# Patient Record
Sex: FEMALE | Race: OTHER | Employment: UNEMPLOYED | URBAN - METROPOLITAN AREA
[De-identification: names, ages, dates, MRNs, and addresses within clinical notes are randomized per-mention and may not be internally consistent; named-entity substitution may affect disease eponyms.]

---

## 2017-05-14 ENCOUNTER — APPOINTMENT (OUTPATIENT)
Dept: ULTRASOUND IMAGING | Age: 32
End: 2017-05-14
Attending: EMERGENCY MEDICINE
Payer: MEDICAID

## 2017-05-14 ENCOUNTER — HOSPITAL ENCOUNTER (EMERGENCY)
Age: 32
Discharge: HOME | End: 2017-05-14
Attending: EMERGENCY MEDICINE
Payer: MEDICAID

## 2017-05-14 VITALS
BODY MASS INDEX: 21.6 KG/M2 | TEMPERATURE: 98.7 F | OXYGEN SATURATION: 100 % | WEIGHT: 110 LBS | DIASTOLIC BLOOD PRESSURE: 55 MMHG | HEART RATE: 64 BPM | HEIGHT: 60 IN | SYSTOLIC BLOOD PRESSURE: 116 MMHG | RESPIRATION RATE: 16 BRPM

## 2017-05-14 DIAGNOSIS — O20.0 THREATENED MISCARRIAGE: ICD-10-CM

## 2017-05-14 DIAGNOSIS — O02.0 BLIGHTED OVUM: Primary | ICD-10-CM

## 2017-05-14 LAB
ABO + RH BLD: NORMAL
ALBUMIN SERPL BCP-MCNC: 3.5 G/DL (ref 3.4–5)
ALBUMIN/GLOB SERPL: 1 {RATIO} (ref 0.8–1.7)
ALP SERPL-CCNC: 83 U/L (ref 45–117)
ALT SERPL-CCNC: 66 U/L (ref 13–56)
AMPHET UR QL SCN: NEGATIVE
ANION GAP BLD CALC-SCNC: 13 MMOL/L (ref 3–18)
APPEARANCE UR: CLEAR
AST SERPL W P-5'-P-CCNC: 31 U/L (ref 15–37)
BARBITURATES UR QL SCN: NEGATIVE
BASOPHILS # BLD AUTO: 0.1 K/UL (ref 0–0.06)
BASOPHILS # BLD: 1 % (ref 0–2)
BENZODIAZ UR QL: NEGATIVE
BILIRUB SERPL-MCNC: 0.2 MG/DL (ref 0.2–1)
BILIRUB UR QL: NEGATIVE
BLOOD GROUP ANTIBODIES SERPL: NORMAL
BUN SERPL-MCNC: 15 MG/DL (ref 7–18)
BUN/CREAT SERPL: 21 (ref 12–20)
CALCIUM SERPL-MCNC: 8.4 MG/DL (ref 8.5–10.1)
CANNABINOIDS UR QL SCN: NEGATIVE
CHLORIDE SERPL-SCNC: 107 MMOL/L (ref 100–108)
CO2 SERPL-SCNC: 24 MMOL/L (ref 21–32)
COCAINE UR QL SCN: NEGATIVE
COLOR UR: YELLOW
CREAT SERPL-MCNC: 0.7 MG/DL (ref 0.6–1.3)
DIFFERENTIAL METHOD BLD: ABNORMAL
EOSINOPHIL # BLD: 0.1 K/UL (ref 0–0.4)
EOSINOPHIL NFR BLD: 0 % (ref 0–5)
ERYTHROCYTE [DISTWIDTH] IN BLOOD BY AUTOMATED COUNT: 14.8 % (ref 11.6–14.5)
GLOBULIN SER CALC-MCNC: 3.4 G/DL (ref 2–4)
GLUCOSE SERPL-MCNC: 137 MG/DL (ref 74–99)
GLUCOSE UR STRIP.AUTO-MCNC: NEGATIVE MG/DL
HCG SERPL-ACNC: 6529 MIU/ML (ref 1–6)
HCT VFR BLD AUTO: 39.9 % (ref 35–45)
HDSCOM,HDSCOM: ABNORMAL
HGB BLD-MCNC: 14 G/DL (ref 12–16)
HGB UR QL STRIP: NEGATIVE
KETONES UR QL STRIP.AUTO: ABNORMAL MG/DL
LACTATE SERPL-SCNC: 1 MMOL/L (ref 0.4–2)
LEUKOCYTE ESTERASE UR QL STRIP.AUTO: NEGATIVE
LIPASE SERPL-CCNC: 128 U/L (ref 73–393)
LYMPHOCYTES # BLD AUTO: 14 % (ref 21–52)
LYMPHOCYTES # BLD: 2.7 K/UL (ref 0.9–3.6)
MAGNESIUM SERPL-MCNC: 1.5 MG/DL (ref 1.6–2.6)
MCH RBC QN AUTO: 30 PG (ref 24–34)
MCHC RBC AUTO-ENTMCNC: 35.1 G/DL (ref 31–37)
MCV RBC AUTO: 85.6 FL (ref 74–97)
METHADONE UR QL: NEGATIVE
MONOCYTES # BLD: 1.4 K/UL (ref 0.05–1.2)
MONOCYTES NFR BLD AUTO: 7 % (ref 3–10)
NEUTS SEG # BLD: 14.8 K/UL (ref 1.8–8)
NEUTS SEG NFR BLD AUTO: 78 % (ref 40–73)
NITRITE UR QL STRIP.AUTO: NEGATIVE
OPIATES UR QL: POSITIVE
PCP UR QL: NEGATIVE
PH UR STRIP: 7 [PH] (ref 5–8)
PLATELET # BLD AUTO: 374 K/UL (ref 135–420)
PMV BLD AUTO: 10.8 FL (ref 9.2–11.8)
POTASSIUM SERPL-SCNC: 3.9 MMOL/L (ref 3.5–5.5)
PROT SERPL-MCNC: 6.9 G/DL (ref 6.4–8.2)
PROT UR STRIP-MCNC: NEGATIVE MG/DL
RBC # BLD AUTO: 4.66 M/UL (ref 4.2–5.3)
SODIUM SERPL-SCNC: 144 MMOL/L (ref 136–145)
SP GR UR REFRACTOMETRY: 1.02 (ref 1–1.03)
SPECIMEN EXP DATE BLD: NORMAL
UROBILINOGEN UR QL STRIP.AUTO: 0.2 EU/DL (ref 0.2–1)
WBC # BLD AUTO: 19.1 K/UL (ref 4.6–13.2)

## 2017-05-14 PROCEDURE — 96374 THER/PROPH/DIAG INJ IV PUSH: CPT

## 2017-05-14 PROCEDURE — 76817 TRANSVAGINAL US OBSTETRIC: CPT

## 2017-05-14 PROCEDURE — 84702 CHORIONIC GONADOTROPIN TEST: CPT | Performed by: EMERGENCY MEDICINE

## 2017-05-14 PROCEDURE — 86900 BLOOD TYPING SEROLOGIC ABO: CPT | Performed by: EMERGENCY MEDICINE

## 2017-05-14 PROCEDURE — 99285 EMERGENCY DEPT VISIT HI MDM: CPT

## 2017-05-14 PROCEDURE — 81003 URINALYSIS AUTO W/O SCOPE: CPT | Performed by: EMERGENCY MEDICINE

## 2017-05-14 PROCEDURE — 83735 ASSAY OF MAGNESIUM: CPT | Performed by: EMERGENCY MEDICINE

## 2017-05-14 PROCEDURE — 83605 ASSAY OF LACTIC ACID: CPT | Performed by: EMERGENCY MEDICINE

## 2017-05-14 PROCEDURE — 83690 ASSAY OF LIPASE: CPT | Performed by: EMERGENCY MEDICINE

## 2017-05-14 PROCEDURE — 96375 TX/PRO/DX INJ NEW DRUG ADDON: CPT

## 2017-05-14 PROCEDURE — 74011250636 HC RX REV CODE- 250/636: Performed by: EMERGENCY MEDICINE

## 2017-05-14 PROCEDURE — 80307 DRUG TEST PRSMV CHEM ANLYZR: CPT | Performed by: EMERGENCY MEDICINE

## 2017-05-14 PROCEDURE — 80053 COMPREHEN METABOLIC PANEL: CPT | Performed by: EMERGENCY MEDICINE

## 2017-05-14 PROCEDURE — 96361 HYDRATE IV INFUSION ADD-ON: CPT

## 2017-05-14 PROCEDURE — 85025 COMPLETE CBC W/AUTO DIFF WBC: CPT | Performed by: EMERGENCY MEDICINE

## 2017-05-14 RX ORDER — OXYCODONE AND ACETAMINOPHEN 5; 325 MG/1; MG/1
1 TABLET ORAL
COMMUNITY
End: 2017-05-26

## 2017-05-14 RX ORDER — SODIUM CHLORIDE 0.9 % (FLUSH) 0.9 %
5-10 SYRINGE (ML) INJECTION EVERY 8 HOURS
Status: DISCONTINUED | OUTPATIENT
Start: 2017-05-14 | End: 2017-05-14 | Stop reason: HOSPADM

## 2017-05-14 RX ORDER — LORAZEPAM 2 MG/ML
1 INJECTION INTRAMUSCULAR
Status: COMPLETED | OUTPATIENT
Start: 2017-05-14 | End: 2017-05-14

## 2017-05-14 RX ORDER — HYDROMORPHONE HYDROCHLORIDE 1 MG/ML
0.5 INJECTION, SOLUTION INTRAMUSCULAR; INTRAVENOUS; SUBCUTANEOUS
Status: COMPLETED | OUTPATIENT
Start: 2017-05-14 | End: 2017-05-14

## 2017-05-14 RX ORDER — ESCITALOPRAM OXALATE 10 MG/1
10 TABLET ORAL DAILY
Status: ON HOLD | COMMUNITY
End: 2018-02-05 | Stop reason: CLARIF

## 2017-05-14 RX ORDER — LORAZEPAM 0.5 MG/1
1 TABLET ORAL
Qty: 3 TAB | Refills: 0 | Status: SHIPPED | OUTPATIENT
Start: 2017-05-14 | End: 2017-05-26

## 2017-05-14 RX ORDER — SODIUM CHLORIDE 0.9 % (FLUSH) 0.9 %
5-10 SYRINGE (ML) INJECTION AS NEEDED
Status: DISCONTINUED | OUTPATIENT
Start: 2017-05-14 | End: 2017-05-14 | Stop reason: HOSPADM

## 2017-05-14 RX ORDER — ONDANSETRON 2 MG/ML
4 INJECTION INTRAMUSCULAR; INTRAVENOUS
Status: COMPLETED | OUTPATIENT
Start: 2017-05-14 | End: 2017-05-14

## 2017-05-14 RX ADMIN — LORAZEPAM 1 MG: 2 INJECTION INTRAMUSCULAR; INTRAVENOUS at 11:56

## 2017-05-14 RX ADMIN — SODIUM CHLORIDE 1000 ML: 900 INJECTION, SOLUTION INTRAVENOUS at 11:49

## 2017-05-14 RX ADMIN — HYDROMORPHONE HYDROCHLORIDE 0.5 MG: 1 INJECTION, SOLUTION INTRAMUSCULAR; INTRAVENOUS; SUBCUTANEOUS at 11:53

## 2017-05-14 RX ADMIN — ONDANSETRON 4 MG: 2 INJECTION INTRAMUSCULAR; INTRAVENOUS at 11:51

## 2017-05-14 NOTE — ED NOTES
Pt hourly rounding competed. Safety   Pt (X) resting on stretcher with side rails up and call bell in reach. () in chair    () in parents arms. Toileting   Pt offered ()Bedpan     ()Assistance to Restroom     ()Urinal  Ongoing Updates  Updated on plan of care and status of test results. Pain Management  Inquired as to comfort and offered comfort measures:    (X) warm blankets   () dimmed lights  Boyfriend to bedside.

## 2017-05-14 NOTE — LETTER
[facility] [place] 
THE [facility] EMERGENCY DEPT 
[address] [address] 
[phone] Work/School Note Date: 5/14/2017 To Whom It May concern: 
 
Yesica De Souza was seen and treated today in the emergency room by the following provider(s): 
Attending Provider: Heather Soria MD. Yesica De Souza may return to work on 05/18/17.  
 
Sincerely, 
 
 
 
 
 
  
Heather Soria MD

## 2017-05-14 NOTE — ED NOTES
Pt assisted up to restroom to provided urine specimen. Pt calm and cooperative at this time. Pt ambulates with slow, steady gait. Pt able to provide urine specimen. Pt states, \"I wanted to talk to the doctor. Some woman came in here when I pressed my call light and stressed the fuck out of me. Do you know anything about blood types and pregnancy? I'm A- and my boyfriends O. Do you think that's caused my abdominal pain. \" Pt education provided and MD aware. Lights dimmed for pt comfort and fresh heating pack applied.

## 2017-05-14 NOTE — DISCHARGE INSTRUCTIONS
Blighted Ovum: Care Instructions  Your Care Instructions  A blighted ovum occurs when a fertilized egg attaches to the inside of the uterus but does not develop into a baby. It is also known as an anembryonic pregnancy. It is usually caused by a mistake in the material of the egg or sperm or the combination of both. Stress, exercise, or sex does not cause this problem. There is nothing you could have done to prevent it. You are likely to miscarry--pass the ovum tissue--by the end of your first trimester. Medicine may be used to help the tissue pass. A process called dilation and curettage (D&C) is sometimes used to remove the tissue. Your body will recover over the next several weeks. Having a miscarriage does not mean that you cannot have a normal pregnancy in the future. Follow-up care is a key part of your treatment and safety. Be sure to make and go to all appointments, and call your doctor if you are having problems. It's also a good idea to know your test results and keep a list of the medicines you take. How can you care for yourself at home? · You will probably have vaginal bleeding, similar to a period, for up to a week. Use pads instead of tampons. You may use tampons during your next period, which should start in 3 to 6 weeks. · Take an over-the-counter pain medicine, such as acetaminophen (Tylenol), ibuprofen (Advil, Motrin), or naproxen (Aleve) for cramps. Read and follow all instructions on the label. You may have cramps for several days after the miscarriage. · Do not take two or more pain medicines at the same time unless the doctor told you to. Many pain medicines have acetaminophen, which is Tylenol. Too much acetaminophen (Tylenol) can be harmful. · Your doctor may want you to collect tissue that you might pass. Use a clear container. Take it to your doctor's office as soon as you can. · Do not have sex until the bleeding stops.   · You may return to your normal activities if you feel well enough to do so. But you should avoid heavy exercise until the bleeding stops. · If you plan to get pregnant again, check with your doctor. Most doctors suggest waiting until you have had at least one normal period before you try to get pregnant. · If you do not want to get pregnant, ask your doctor about birth control. You can get pregnant again before your next period starts if you are not using birth control. · You may be low in iron because of blood loss. Eat a balanced diet that is high in iron and vitamin C. Foods rich in iron include red meat, shellfish, eggs, beans, and leafy green vegetables. Talk to your doctor about whether you need to take iron pills or a multivitamin. · The loss of a pregnancy can be very hard. You may wonder why it happened and blame yourself. Talking to family members, friends, a counselor, or your doctor may help you cope with your loss. When should you call for help? Call 911 anytime you think you may need emergency care. For example, call if:  · You passed out (lost consciousness). · You have sudden, severe pain in your belly or pelvis. Call your doctor now or seek immediate medical care if:  · You have severe vaginal bleeding. This means that you are soaking through your usual pads each hour for 2 or more hours. · You are dizzy or lightheaded, or you feel like you may faint. · You have a fever. · You have new or increased belly pain. · You have vaginal discharge that smells bad. Watch closely for changes in your health, and be sure to contact your doctor if:  · You pass tissue (not just blood). · You feel very sad or are not able to function. Where can you learn more? Go to http://shruti-pilo.info/. Enter H642 in the search box to learn more about \"Blighted Ovum: Care Instructions. \"  Current as of: May 30, 2016  Content Version: 11.2  © 9283-1362 [employer], [employer].  Care instructions adapted under license by [facility] (which disclaims liability or warranty for this information). If you have questions about a medical condition or this instruction, always ask your healthcare professional. [place] [address] any warranty or liability for your use of this information. Threatened Miscarriage: Care Instructions  Your Care Instructions    Some women have light spotting or bleeding during the first 12 weeks of pregnancy. In some cases this is normal. Light spotting or bleeding can also be a sign of a possible loss of the pregnancy. This is called a threatened miscarriage. At this point, the doctor may not be able to tell if your vaginal bleeding is normal or is a sign of a miscarriage. In early pregnancy, things such as stress, exercise, and sex do not cause miscarriage. You may be worried or upset about the possibility of losing your pregnancy. But do not blame yourself. There is no treatment to stop a threatened miscarriage. If you do have a miscarriage, there was nothing you could have done to prevent it. A miscarriage usually means that the pregnancy is not developing normally. The doctor has checked you carefully, but problems can develop later. If you notice any problems or new symptoms, get medical treatment right away. Follow-up care is a key part of your treatment and safety. Be sure to make and go to all appointments, and call your doctor if you are having problems. It's also a good idea to know your test results and keep a list of the medicines you take. How can you care for yourself at home? · If you do have a miscarriage, you will probably have some vaginal bleeding for 1 to 2 weeks. Use pads instead of tampons. · Take acetaminophen (Tylenol) for cramps. Read and follow all instructions on the label. · Do not take two or more pain medicines at the same time unless the doctor told you to. Many pain medicines have acetaminophen, which is Tylenol.  Too much acetaminophen (Tylenol) can be harmful. · Do not have sex until your doctor says it is okay. · Get lots of rest over the next several days. · You may do your normal activities if you feel well enough to do them. But do not do any heavy exercise until your doctor says it is okay. · Eat a balanced diet that is high in iron and vitamin C. Foods rich in iron include red meat, shellfish, eggs, beans, and leafy green vegetables. Foods high in vitamin C include citrus fruits, tomatoes, and broccoli. Talk to your doctor about whether you need to take iron pills or a multivitamin. · Do not drink alcohol or use tobacco or illegal drugs. · Do not smoke. If you need help quitting, talk to your doctor about stop-smoking programs and medicines. These can increase your chances of quitting for good. When should you call for help? Call 911 anytime you think you may need emergency care. For example, call if:  · You have sudden, severe pain in your belly or pelvis. · You passed out (lost consciousness). · You have severe vaginal bleeding. Call your doctor now or seek immediate medical care if:  · You are dizzy or lightheaded, or you feel like you may faint. · You have new or increased pain in your belly or pelvis. · Your vaginal bleeding is getting worse. · You have increased pain in the vaginal area. · You have a fever. · You think you may have passed tissue. Save any tissue that you pass. Take it to your doctor's office as soon as you can. Watch closely for changes in your health, and be sure to contact your doctor if:  · You have new or worse vaginal discharge. · You do not get better as expected. Where can you learn more? Go to http://shruti-pilo.info/. Enter Q270 in the search box to learn more about \"Threatened Miscarriage: Care Instructions. \"  Current as of: May 30, 2016  Content Version: 11.2  © 3752-8191 [employer].  Care instructions adapted under license by [employer] (which disclaims liability or warranty for this information). If you have questions about a medical condition or this instruction, always ask your healthcare professional. [place] [address] any warranty or liability for your use of this information. Threatened Miscarriage: Care Instructions  Your Care Instructions    Some women have light spotting or bleeding during the first 12 weeks of pregnancy. In some cases this is normal. Light spotting or bleeding can also be a sign of a possible loss of the pregnancy. This is called a threatened miscarriage. At this point, the doctor may not be able to tell if your vaginal bleeding is normal or is a sign of a miscarriage. In early pregnancy, things such as stress, exercise, and sex do not cause miscarriage. You may be worried or upset about the possibility of losing your pregnancy. But do not blame yourself. There is no treatment to stop a threatened miscarriage. If you do have a miscarriage, there was nothing you could have done to prevent it. A miscarriage usually means that the pregnancy is not developing normally. The doctor has checked you carefully, but problems can develop later. If you notice any problems or new symptoms, get medical treatment right away. Follow-up care is a key part of your treatment and safety. Be sure to make and go to all appointments, and call your doctor if you are having problems. It's also a good idea to know your test results and keep a list of the medicines you take. How can you care for yourself at home? · If you do have a miscarriage, you will probably have some vaginal bleeding for 1 to 2 weeks. Use pads instead of tampons. · Take acetaminophen (Tylenol) for cramps. Read and follow all instructions on the label. · Do not take two or more pain medicines at the same time unless the doctor told you to. Many pain medicines have acetaminophen, which is Tylenol. Too much acetaminophen (Tylenol) can be harmful.   · Do not have sex until your doctor says it is okay. · Get lots of rest over the next several days. · You may do your normal activities if you feel well enough to do them. But do not do any heavy exercise until your doctor says it is okay. · Eat a balanced diet that is high in iron and vitamin C. Foods rich in iron include red meat, shellfish, eggs, beans, and leafy green vegetables. Foods high in vitamin C include citrus fruits, tomatoes, and broccoli. Talk to your doctor about whether you need to take iron pills or a multivitamin. · Do not drink alcohol or use tobacco or illegal drugs. · Do not smoke. If you need help quitting, talk to your doctor about stop-smoking programs and medicines. These can increase your chances of quitting for good. When should you call for help? Call 911 anytime you think you may need emergency care. For example, call if:  · You have sudden, severe pain in your belly or pelvis. · You passed out (lost consciousness). · You have severe vaginal bleeding. Call your doctor now or seek immediate medical care if:  · You are dizzy or lightheaded, or you feel like you may faint. · You have new or increased pain in your belly or pelvis. · Your vaginal bleeding is getting worse. · You have increased pain in the vaginal area. · You have a fever. · You think you may have passed tissue. Save any tissue that you pass. Take it to your doctor's office as soon as you can. Watch closely for changes in your health, and be sure to contact your doctor if:  · You have new or worse vaginal discharge. · You do not get better as expected. Where can you learn more? Go to http://shruti-pilo.info/. Enter D828 in the search box to learn more about \"Threatened Miscarriage: Care Instructions. \"  Current as of: May 30, 2016  Content Version: 11.2  © 5227-0105 [employer], [employer].  Care instructions adapted under license by [employer] (which disclaims liability or warranty for this information). If you have questions about a medical condition or this instruction, always ask your healthcare professional. [address] any warranty or liability for your use of this information. Blighted Ovum: Care Instructions  Your Care Instructions  A blighted ovum occurs when a fertilized egg attaches to the inside of the uterus but does not develop into a baby. It is also known as an anembryonic pregnancy. It is usually caused by a mistake in the material of the egg or sperm or the combination of both. Stress, exercise, or sex does not cause this problem. There is nothing you could have done to prevent it. You are likely to miscarry--pass the ovum tissue--by the end of your first trimester. Medicine may be used to help the tissue pass. A process called dilation and curettage (D&C) is sometimes used to remove the tissue. Your body will recover over the next several weeks. Having a miscarriage does not mean that you cannot have a normal pregnancy in the future. Follow-up care is a key part of your treatment and safety. Be sure to make and go to all appointments, and call your doctor if you are having problems. It's also a good idea to know your test results and keep a list of the medicines you take. How can you care for yourself at home? · You will probably have vaginal bleeding, similar to a period, for up to a week. Use pads instead of tampons. You may use tampons during your next period, which should start in 3 to 6 weeks. · Take an over-the-counter pain medicine, such as acetaminophen (Tylenol), ibuprofen (Advil, Motrin), or naproxen (Aleve) for cramps. Read and follow all instructions on the label. You may have cramps for several days after the miscarriage. · Do not take two or more pain medicines at the same time unless the doctor told you to. Many pain medicines have acetaminophen, which is Tylenol. Too much acetaminophen (Tylenol) can be harmful.   · Your doctor may want you to collect tissue that you might pass. Use a clear container. Take it to your doctor's office as soon as you can. · Do not have sex until the bleeding stops. · You may return to your normal activities if you feel well enough to do so. But you should avoid heavy exercise until the bleeding stops. · If you plan to get pregnant again, check with your doctor. Most doctors suggest waiting until you have had at least one normal period before you try to get pregnant. · If you do not want to get pregnant, ask your doctor about birth control. You can get pregnant again before your next period starts if you are not using birth control. · You may be low in iron because of blood loss. Eat a balanced diet that is high in iron and vitamin C. Foods rich in iron include red meat, shellfish, eggs, beans, and leafy green vegetables. Talk to your doctor about whether you need to take iron pills or a multivitamin. · The loss of a pregnancy can be very hard. You may wonder why it happened and blame yourself. Talking to family members, friends, a counselor, or your doctor may help you cope with your loss. When should you call for help? Call 911 anytime you think you may need emergency care. For example, call if:  · You passed out (lost consciousness). · You have sudden, severe pain in your belly or pelvis. Call your doctor now or seek immediate medical care if:  · You have severe vaginal bleeding. This means that you are soaking through your usual pads each hour for 2 or more hours. · You are dizzy or lightheaded, or you feel like you may faint. · You have a fever. · You have new or increased belly pain. · You have vaginal discharge that smells bad. Watch closely for changes in your health, and be sure to contact your doctor if:  · You pass tissue (not just blood). · You feel very sad or are not able to function. Where can you learn more? Go to http://shruti-pilo.info/.   Enter Y205 in the search box to learn more about \"Blighted Ovum: Care Instructions. \"  Current as of: May 30, 2016  Content Version: 11.2  © 7636-4148 [employer], [employer]. Care instructions adapted under license by [employer] (which disclaims liability or warranty for this information). If you have questions about a medical condition or this instruction, always ask your healthcare professional. [place] [address] any warranty or liability for your use of this information.

## 2017-05-14 NOTE — ED NOTES
Spoke with Javier Jones, pt's boyfriend, per pt's consent via telephone. Pt's boyfriend states that he will be coming to ED and if pt is discharged home today, he will be her ride. Warm blankets and heating pad provided for pt comfort. Pt resting calmly in stretcher, requesting ice chips. Pt educated that she is to remain NPO at this time until test results come back. Pt becomes more agitated at this time, moving around in stretcher, stating \"the pain is coming back! \" Call light within reach and side rails raised x 2. MD aware.

## 2017-05-14 NOTE — ED NOTES
Discharge instructions reviewed with the patient with opportunity for questions given. Patient states, \"I was really hoping that you all could help me out with the pain medication because the medication is not working and I don't want to be in pain while I'm pregnant. That is really why I came here today. \" Patient educated that matter would need to be discussed with PCP who prescribed medication and OBGYN. The patient verbalized understanding. Patient armband removed and shredded. Patient in stable condition at time of discharge. Work notes provided for patient and boyfriend.

## 2017-05-14 NOTE — ED NOTES
Entered pt's room. Pt and boyfriend agitated. Jerry Cruz, boyfriend, states, \"So basically I need my note because I have to go now. My dad has things planned for my mom for mother's day and I have to go and there is not gonna be anyone to drive her home. \" Pt educated regarding need for Rhogam and the risks of leaving without receiving shot. MD to bedside to speak with pt. Pt states to MD and this RN, \"But we are both negative blood types and this band works for 72 hours so I could come back or go to my OB. But I won't have a ride if he leaves so my decision is made for me and I have to go. \" Pt tearful and appears anxious. Pt to be discharged.

## 2017-05-14 NOTE — ED NOTES
Pt updated on plan of care regarding Rhogam shot. Pt in NAD at this time, calm and cooperative with this RN. Boyfriend remains at bedside.

## 2017-05-14 NOTE — ED NOTES
Call made to blood bank for ETA of Rhogam shot. Informed time frame would be approximately 15-25 minutes.

## 2017-05-14 NOTE — LETTER
[facility] [place] 
THE [facility] EMERGENCY DEPT 
[address] [address] 
[phone] Work/School Note Date: 5/14/2017 To Whom It May concern: 
 
Campos Fish was seen and treated today in the emergency room by the following provider(s): 
Attending Provider: Emma Gamboa MD. Jose Angel Alcala's boyfriend, London Cornejo II, was with her in the ED during treatment. He maymay return to work on 05/15/17.  
 
Sincerely, 
 
 
 
 
 
 
Emma Gamboa MD

## 2017-05-14 NOTE — ED NOTES
Pt back from ultrasound, VS updated. Per MD, no pelvic exam to be performed at this time. Pt calm, cooperative, and appropriate with this RN. Pt sitting up in stretcher in NAD, eating ice chips per consent of MD. Pt up dated on plan of care regarding results of ultrasound. Pt's boyfriend to bedside.

## 2017-05-14 NOTE — ED NOTES
Pt medicated for nausea. Pt states, \"You didn't give me Zofran did you? That never works for me. Only Phenergan works for me. I know its weird because they give it to cancer patients but only Phenergan helps me. \" Pt becomes progressively more calm after Dilaudid administration rating her pain as 4/10, approximately 1 minutes after administration. Pt staring blankly at wall, cooperative with this RN's questions.

## 2017-05-14 NOTE — ED PROVIDER NOTES
HPI Comments:   11:41 AM    Malu Jenkins is a A3 36 y.o. female with pertinent PMHx of anxiety and endometriosis presenting via EMS to the ED c/o left lower abdominal pain, which radiates to her left flank, x over a year. Pt states that her pain is similar to her hx of endometriosis, of which she has daily flares. Pt states that she takes warm baths and takes oxycodone 10 mg (prescribed by PCP over the last month), which she ran out of 3 days ago. Pt notes associated symptom of anxiety, nausea and vomiting. Pt states that she takes Lexapro for anxiety, but states that \"this isn't helping anymore\". Per chart review she had a positive pregnancy test on 17, at another facility. Pt states that they provided her Dilaudid and Oxycodone while at the other facility, with significant relief of her pain. Pt states that the first week of her LMP was ~ a month and 2-3 weeks ago. Pt states that her last pregnancy was ~1 year and 3 months ago, with twins (one being a still birth). Pt states that she had to have an emergency D&C after she delivered her twins to term, due to remaining placental tissue. Pt specifically denies any new pain, productive cough or fever/chills. PCP: [facility]  Social Hx: + tobacco use, + occasional alcohol use, - illicit drug use    There are no other complaints, changes, or physical findings at this time. The history is provided by the patient. No  was used. Past Medical History:   Diagnosis Date    Anxiety     Asthma     Endometriosis        Past Surgical History:   Procedure Laterality Date    HX DILATION AND CURETTAGE      HX LEEP PROCEDURE           History reviewed. No pertinent family history. Social History     Social History    Marital status: UNKNOWN     Spouse name: N/A    Number of children: N/A    Years of education: N/A     Occupational History    Not on file.      Social History Main Topics    Smoking status: Current Every Day Smoker     Packs/day: 0.50    Smokeless tobacco: Not on file    Alcohol use Yes      Comment: Occasionally     Drug use: No    Sexual activity: Not on file     Other Topics Concern    Not on file     Social History Narrative    No narrative on file         ALLERGIES: Review of patient's allergies indicates no known allergies. Review of Systems   Constitutional: Negative for appetite change, chills and fever. HENT: Negative for congestion, mouth sores, rhinorrhea and sore throat. Eyes: Negative for pain. Respiratory: Negative for cough, chest tightness, shortness of breath and wheezing. Cardiovascular: Negative for chest pain and leg swelling. Gastrointestinal: Positive for abdominal pain (left lower), nausea and vomiting. Negative for diarrhea. Genitourinary: Positive for flank pain (left). Negative for difficulty urinating, dysuria and urgency. Musculoskeletal: Negative for arthralgias and myalgias. Neurological: Negative for weakness and headaches. Psychiatric/Behavioral: The patient is nervous/anxious. All other systems reviewed and are negative. Vitals:    05/14/17 1410 05/14/17 1420 05/14/17 1445 05/14/17 1455   BP: 110/65 114/61 112/63 108/64   Pulse: 75 70 79 81   Resp: 14 12 16 23   Temp:       SpO2: 99% 100%     Weight:       Height:                Physical Exam   Nursing note and vitals reviewed. -------------------------PHYSICAL EXAM-------------------------    Vital signs and nursing notes reviewed  Nursing note and VS were reviewed      CONSTITUTIONAL: Mental status: Awake and alert. Extremely anxious. Tearful. Appears to be in pain. Non-toxic in appearance. Well developed, well nourished and appears adequately hydrated. HEAD: Normocephalic, Atraumatic. EYES: Pupils are equal, round and reactive. Extra-ocular movements intact. Sclera are non icteric. Conjunctiva not injected. ENT: Mucous membranes are moist.   Oral mucosa:  There is no erythema or swelling; No oral lesions or thrush. Tonsillar tissue: NO enlargement of the tonsillar tissue or the presence of exudates. Ears: R:  EAC - clear, no swelling with TM that is normal in appearance. L:  EAC - clear, no swelling with TM that is normal in appearance. Nasal mucosa pink with no discharge and the turbinates are of normal size. NECK: Normal ROM. Neck is supple. Anterior aspect: Anterior cervical adenopathy: nothing abnormal.  No obvious enlargement of the thyroid. Trachea is midline. Posterior aspect: Posterior cervical paraspinal muscles are non tender and  bony midline is non tender. Posterior adenopathy: none palpable. CARDIOVASCULAR:  The rhythm is regular and the rate sounds to be normal. No murmurs, rubs or gallops. Distal pulses are 2+ and equal.    PULMONARY: Respiratory effort is normal and without the use of accessory muscles. Patient is speaking in full sentences. Lungs with good air movement. Some Rhonchi/wheezes to the left. CHEST WALL: Normal shape;  non tender to palpation; no crepitus    ABDOMINAL: Visual: non -distended; Ausculation: Bowel sounds are present. Palpation: Soft; No obvious organomegaly; Palpable tenderness: Diffuse TTP. NO peritoneal signs - No rebound, guarding or rigidity. BACK: Midline - No bony tenderness; Paraspinal muscles are non tender. Full range of motion. No CVA tenderness. MUSCULOSKELETAL:  Left flank is TTP. Lower Extremities: Peripheral edema- none noted; In general there is No obvious soft tissue tenderness or sites of bony tenderness or deformities;   Joints: No evidence of acute inflammatory changes and have good range of motion in all extremities. Muscles: Good tone and No obvious muscle tenderness. Upper Extremities: Peripheral edema- none noted;  In general there is No obvious soft tissue tenderness or sites of bony tenderness or deformities;   Joints: No evidence of acute inflammatory changes and have good range of motion in all extremities. Muscles: Good tone and No obvious muscle tenderness. SKIN:  Good skin turgor. Cap Refill is Normal. Skin is warm and dry and with NO diaphoresis. Rashes: NONE or nothing that appears infectious; NO petechiae. NO particular lesions. NEUROLOGICAL: Alert, awake and appropriately oriented. Normal speech. CN's are normal;  Motor - no focal weakness; no obvious sensory loss; Cerebellar function- intact; DTR's - 2+ equal    PSYCH: Extremely anxious; Tearful. normal thought content; no expressed suicidal ideation. MDM  Number of Diagnoses or Management Options  Diagnosis management comments: INITIAL CLINICAL IMPRESSION and PLANS:  The patient presents with the primary complaint(s) of: abdominal pain. The presentation, to include historical aspects and clinical findings appear to be consistent with the DX of ovarian pain. However, other possible DX's to consider as primary, associated with, or exacerbated by include:    1. Endometriosis  2. Ectopic pregnancy  3. Kidney stone  4. Chronic abdominal pain    Considering the above, my initial management plan to evaluate and therapeutic interventions include: Obtain Lab Studies,, Obtain Radiologic studies,, Initiate Medications and or IV Fluids, and Obtain additional historical data from other sources, as well as those noted in the orders:    Aster Rizo MD         Amount and/or Complexity of Data Reviewed  Clinical lab tests: ordered and reviewed  Tests in the radiology section of CPT®: ordered and reviewed (Transvaginal US)  Review and summarize past medical records: yes      ED Course       Procedures  11:43 AM - Pt states that she is aware of the potential negative affects of medications for anxiety and pain on her pregnancy. Pt has been warned and pt was asked to repeat the warning, to insure that she was aware. 11:58 AM - Reviewed pts records of admission on 5/08/17, which indicated an HCG of 674.  An US at that time showed no yolk sac or fetal pole and possible early gestational sac vs pseudo gestational sac. PULSE OXIMETRY NOTE:  12:06 PM   Pulse-ox is 100% on room air  Interpretation: Normal  Intervention: Monitor   Written by GUADALUPE Manjarrez, as dictated by Kerry Valverde MD.     CARDIAC MONITOR NOTE:  12:06 PM   Cardiac Rhythm: NSR  Rate: 63 bpm  Intervention: Monitor   Written by GUADALUPE Manjarrez, as dictated by Kerry Valverde MD.       Progress Note:  2:30 PM  Pt and/or family have been updated on their results. Pt and/or pt's family are aware of the plan of care and are in agreement. Pt is more cooperative. Pt is still having intermittent pains. My concern is that her HCG level is in excess of 6500, but there is no yolk sac of fetal pole noted in the uterus. I will try to be contact with the [facility] physician that she had a previous appointment with. Written by GUADALUPE Jones, as dictated by Kerry Valverde MD.     Consult Note:  3:08 PM  Kerry Valverde MD spoke with Louie Velasquez MD,  Specialty: OB/GYN  Discussed pt's hx, disposition, and available diagnostic and imaging results. Reviewed care plans. Consultant agrees with plans as outlined. Louie Velasquez MD advises having the pt go home and follow up as an outpatient. She believes the pt's symptoms may be consistent with a blighted ovum. Written by GUADALUPE Jones, as dictated by Kerry Valverde MD.     3:32 PM - Had a long discussion with the pt about the results of the tests and the possibility of an early pregnancy, miscarriage or blighted ovum. Pt describes having daily bouts of anxiety in the morning and is concerned about a reoccurring episode. It is clear that the pt has an overwhelming anxiety condition.  For this reason, will prescribe 2 Ativan tablets to cover her until she sees the OB/GYN.    4:46 PM - Pt states that she has to leave, prior to receiving her Rhogam injection, as her dwayne needs to leave at this time. Pt asks if she can come back tomorrow. Made pt aware, of the proper precautions related to Rhogam's use and the risk associated with future complications. Given that both the father and mother are RH negative, there is a low risk. ED CLINICAL SUMMARY - DISCHARGE     3:46 PM  CLINICAL COURSE while in the ED:      Intervention(s) while in ED:  ACTIONS / APPROACH: Based on the presenting CHRONIC history of lower abdominal pain. My initial focus was to Determine the cause and extent of the problem and Initiate Treatment as Appropriate . Details of actions taken are noted below. SPECIFICS REGARDING APPROACH:     1. DIAGNOSTIC RESULTS:   US UTS TRANSVAGINAL OB   Final Result   IMPRESSION: Saclike structure within the uterus with mean diameter of 7.6 mm. This could represent a gestational sac or pseudogestational sac. Differential  considerations include early gestation, failed gestation, and nonvisualized  ectopic cannot be entirely excluded. Recommend close clinical and laboratory  follow-up with repeat ultrasound as warranted            Labs Reviewed   CBC WITH AUTOMATED DIFF - Abnormal; Notable for the following:        Result Value    WBC 19.1 (*)     RDW 14.8 (*)     NEUTROPHILS 78 (*)     LYMPHOCYTES 14 (*)     ABS. NEUTROPHILS 14.8 (*)     ABS. MONOCYTES 1.4 (*)     ABS.  BASOPHILS 0.1 (*)     All other components within normal limits   METABOLIC PANEL, COMPREHENSIVE - Abnormal; Notable for the following:     Glucose 137 (*)     BUN/Creatinine ratio 21 (*)     Calcium 8.4 (*)     ALT (SGPT) 66 (*)     All other components within normal limits   MAGNESIUM - Abnormal; Notable for the following:     Magnesium 1.5 (*)     All other components within normal limits   URINALYSIS W/ RFLX MICROSCOPIC - Abnormal; Notable for the following:     Ketone TRACE (*)     All other components within normal limits   TOTAL HCG, QT. - Abnormal; Notable for the following:     Beta HCG, QT 6529 (*)     All other components within normal limits   DRUG SCREEN, URINE - Abnormal; Notable for the following:     OPIATES POSITIVE (*)     All other components within normal limits   LACTIC ACID, PLASMA   LIPASE   TYPE & SCREEN   RH IMMUNE GLOBULIN PROPHYLACTIC           Recent Results (from the past 12 hour(s))   CBC WITH AUTOMATED DIFF    Collection Time: 05/14/17 11:25 AM   Result Value Ref Range    WBC 19.1 (H) 4.6 - 13.2 K/uL    RBC 4.66 4.20 - 5.30 M/uL    HGB 14.0 12.0 - 16.0 g/dL    HCT 39.9 35.0 - 45.0 %    MCV 85.6 74.0 - 97.0 FL    MCH 30.0 24.0 - 34.0 PG    MCHC 35.1 31.0 - 37.0 g/dL    RDW 14.8 (H) 11.6 - 14.5 %    PLATELET 670 391 - 604 K/uL    MPV 10.8 9.2 - 11.8 FL    NEUTROPHILS 78 (H) 40 - 73 %    LYMPHOCYTES 14 (L) 21 - 52 %    MONOCYTES 7 3 - 10 %    EOSINOPHILS 0 0 - 5 %    BASOPHILS 1 0 - 2 %    ABS. NEUTROPHILS 14.8 (H) 1.8 - 8.0 K/UL    ABS. LYMPHOCYTES 2.7 0.9 - 3.6 K/UL    ABS. MONOCYTES 1.4 (H) 0.05 - 1.2 K/UL    ABS. EOSINOPHILS 0.1 0.0 - 0.4 K/UL    ABS. BASOPHILS 0.1 (H) 0.0 - 0.06 K/UL    DF AUTOMATED     METABOLIC PANEL, COMPREHENSIVE    Collection Time: 05/14/17 11:25 AM   Result Value Ref Range    Sodium 144 136 - 145 mmol/L    Potassium 3.9 3.5 - 5.5 mmol/L    Chloride 107 100 - 108 mmol/L    CO2 24 21 - 32 mmol/L    Anion gap 13 3.0 - 18 mmol/L    Glucose 137 (H) 74 - 99 mg/dL    BUN 15 7.0 - 18 MG/DL    Creatinine 0.70 0.6 - 1.3 MG/DL    BUN/Creatinine ratio 21 (H) 12 - 20      GFR est AA >60 >60 ml/min/1.73m2    GFR est non-AA >60 >60 ml/min/1.73m2    Calcium 8.4 (L) 8.5 - 10.1 MG/DL    Bilirubin, total 0.2 0.2 - 1.0 MG/DL    ALT (SGPT) 66 (H) 13 - 56 U/L    AST (SGOT) 31 15 - 37 U/L    Alk.  phosphatase 83 45 - 117 U/L    Protein, total 6.9 6.4 - 8.2 g/dL    Albumin 3.5 3.4 - 5.0 g/dL    Globulin 3.4 2.0 - 4.0 g/dL    A-G Ratio 1.0 0.8 - 1.7     MAGNESIUM    Collection Time: 05/14/17 11:25 AM   Result Value Ref Range    Magnesium 1.5 (L) 1.6 - 2.6 mg/dL   LIPASE    Collection Time: 05/14/17 11:25 AM   Result Value Ref Range    Lipase 128 73 - 393 U/L   TOTAL HCG, QT. Collection Time: 05/14/17 11:25 AM   Result Value Ref Range    Beta HCG, QT 6529 (H) 1.0 - 6.0 MIU/ML   LACTIC ACID, PLASMA    Collection Time: 05/14/17 11:48 AM   Result Value Ref Range    Lactic acid 1.0 0.4 - 2.0 MMOL/L   URINALYSIS W/ RFLX MICROSCOPIC    Collection Time: 05/14/17 12:52 PM   Result Value Ref Range    Color YELLOW      Appearance CLEAR      Specific gravity 1.016 1.005 - 1.030      pH (UA) 7.0 5.0 - 8.0      Protein NEGATIVE  NEG mg/dL    Glucose NEGATIVE  NEG mg/dL    Ketone TRACE (A) NEG mg/dL    Bilirubin NEGATIVE  NEG      Blood NEGATIVE  NEG      Urobilinogen 0.2 0.2 - 1.0 EU/dL    Nitrites NEGATIVE  NEG      Leukocyte Esterase NEGATIVE  NEG     DRUG SCREEN, URINE    Collection Time: 05/14/17 12:52 PM   Result Value Ref Range    BENZODIAZEPINE NEGATIVE  NEG      BARBITURATES NEGATIVE  NEG      THC (TH-CANNABINOL) NEGATIVE  NEG      OPIATES POSITIVE (A) NEG      PCP(PHENCYCLIDINE) NEGATIVE  NEG      COCAINE NEGATIVE  NEG      AMPHETAMINE NEGATIVE  NEG      METHADONE NEGATIVE  NEG      HDSCOM (NOTE)    TYPE & SCREEN    Collection Time: 05/14/17  3:44 PM   Result Value Ref Range    Crossmatch Expiration 05/17/2017     ABO/Rh(D) A NEGATIVE     Antibody screen NEG        2.  MEDICATIONS GIVEN:   Medications   sodium chloride (NS) flush 5-10 mL (not administered)   sodium chloride (NS) flush 5-10 mL (not administered)   Rho D immune globulin (RHOGAM) 1,500 unit (300 mcg) injection 0.3 mg (not administered)   sodium chloride 0.9 % bolus infusion 1,000 mL (0 mL IntraVENous IV Completed 5/14/17 1400)   ondansetron (ZOFRAN) injection 4 mg (4 mg IntraVENous Given 5/14/17 1151)   HYDROmorphone (PF) (DILAUDID) injection 0.5 mg (0.5 mg IntraVENous Given 5/14/17 1153)   LORazepam (ATIVAN) injection 1 mg (1 mg IntraVENous Given 5/14/17 1156)        Response to Intervention(s):   IMPROVED       Unanticipated Developments: NONE     ED COURSE - General Comment:  During the ED course I had re-evaluated the patient, answered their and /or their family's questions regarding my clinical impression, the patient's condition and plans for therapeutic interventions. The patient's ED course was uneventful and remained stable throughout. CLINICAL IMPRESSION AND DISCUSSION:   I reviewed our electronic medical record system for any past medical records that were available that may contribute to the patients current condition, the nursing notes and vital signs from today's visit. Based on the clinical presentation, findings and results of diagnostic studies, as well as developments while in the ED,  I suspect the following: For the presentation noted above    My clinical impression is: blighted ovum, threatened miscarriage        DISCUSSION REGARDING CLINICAL IMPRESSION: The specifics regarding my clinical impression / diagnosis are as follows: At this time there is no clinical evidence to support other pertinent diagnostic considerations such as:   N/A    SUMMARY DISCUSSION:        Specific Conversations:  NONE      DISPOSITION DECISION:     DISCHARGE: I feel that we have optimized outpatient assessment and management such that Campos Fish is stable to be discharged and to continue with her care or complete any additional evaluation as appropriate at home or as an outpatient. Preparations will be made to discharge the patient. Present condition at the time of disposition: STABLE    ANY SPECIFIC INFORMATION REGARDING THE DISPOSITION: NONE        DISCHARGE NOTE:    Jose Angel Alcala's  results have been reviewed with her. She has been counseled regarding her diagnosis, treatment, and plan. She verbally conveys understanding and agreement of the signs, symptoms, diagnosis, treatment and prognosis and additionally agrees to follow up as discussed.   She also agrees with the care-plan and conveys that all of her questions have been answered. I have also provided discharge instructions for her that include: educational information regarding their diagnosis and treatment, and list of reasons why they would want to return to the ED prior to their follow-up appointment, should her condition change. The patient and/or family has been provided with education for proper Emergency Department utilization. CLINICAL IMPRESSION  1. Blighted ovum    2. Threatened miscarriage        PLAN:  1. D/C home  2. Patient's Medications   Start Taking    LORAZEPAM (ATIVAN) 0.5 MG TABLET    Take 2 Tabs by mouth every twelve (12) hours as needed for Anxiety. Max Daily Amount: 2 mg. Continue Taking    ESCITALOPRAM OXALATE (LEXAPRO) 10 MG TABLET    Take 10 mg by mouth daily. OXYCODONE-ACETAMINOPHEN (PERCOCET) 5-325 MG PER TABLET    Take 1 Tab by mouth every four (4) hours as needed for Pain. These Medications have changed    No medications on file   Stop Taking    No medications on file     3. Follow-up Information     Follow up With Details Comments [address], [place] Schedule an appointment as soon as possible for a visit in 2 days for PCP follow up [address] [place] [address]  [phone]      Mitchel Gómez, DO Schedule an appointment as soon as possible for a visit in 2 days for PCP follow up [address] [facility]      [facility] EMERGENCY DEPT  As needed, If symptoms worsen [address] [id number] [phone]        Return if sxs worsen    ATTESTATIONS:  This note is prepared by Shaylee Singh, acting as Scribe for Emma Gamboa MD.    Emma Gamboa MD: The scribe's documentation has been prepared under my direction and personally reviewed by me in its entirety. I confirm that the note above accurately reflects all work, treatment, procedures, and medical decision making performed by me.

## 2017-05-14 NOTE — ED NOTES
MD at bedside assessing pt. Pt remains agitated moving around in stretcher, hitting wall with fist, and talking in a high-pitched voice. Pt states that her PCP prescribed her the pain medication x 1 month ago for her endometriosis. Pt states this abdominal pain has been bothering her for approximately 1 yr. Pt seen at [facility] on 5/8/17 for same complaint and told she was pregnant. MD aware and gives verbal consent to continue with pain medication and Ativan due to pt's agitated state. Pt states she has not taken her Lexapro in 3 days because she ran out, but that it has not been working because, \"my head still feels like it has a cho cho train in it! \"

## 2017-05-15 LAB
BLD PROD TYP BPU: NORMAL
BPU ID: NORMAL
CALLED TO:,BCALL1: NORMAL
STATUS OF UNIT,%ST: NORMAL
UNIT DIVISION, %UDIV: 0

## 2017-05-26 ENCOUNTER — HOSPITAL ENCOUNTER (EMERGENCY)
Age: 32
Discharge: HOME | End: 2017-05-26
Attending: EMERGENCY MEDICINE
Payer: MEDICAID

## 2017-05-26 ENCOUNTER — APPOINTMENT (OUTPATIENT)
Dept: ULTRASOUND IMAGING | Age: 32
End: 2017-05-26
Attending: EMERGENCY MEDICINE
Payer: MEDICAID

## 2017-05-26 VITALS
WEIGHT: 110 LBS | OXYGEN SATURATION: 100 % | TEMPERATURE: 97.5 F | HEART RATE: 67 BPM | RESPIRATION RATE: 18 BRPM | DIASTOLIC BLOOD PRESSURE: 58 MMHG | HEIGHT: 60 IN | BODY MASS INDEX: 21.6 KG/M2 | SYSTOLIC BLOOD PRESSURE: 111 MMHG

## 2017-05-26 DIAGNOSIS — O20.0 THREATENED ABORTION: ICD-10-CM

## 2017-05-26 DIAGNOSIS — R10.84 GENERALIZED ABDOMINAL PAIN: Primary | ICD-10-CM

## 2017-05-26 LAB
ALBUMIN SERPL BCP-MCNC: 3.7 G/DL (ref 3.4–5)
ALBUMIN/GLOB SERPL: 1 {RATIO} (ref 0.8–1.7)
ALP SERPL-CCNC: 70 U/L (ref 45–117)
ALT SERPL-CCNC: 38 U/L (ref 13–56)
AMPHET UR QL SCN: NEGATIVE
ANION GAP BLD CALC-SCNC: 13 MMOL/L (ref 3–18)
APPEARANCE UR: ABNORMAL
AST SERPL W P-5'-P-CCNC: 21 U/L (ref 15–37)
BACTERIA URNS QL MICRO: ABNORMAL /HPF
BARBITURATES UR QL SCN: NEGATIVE
BASOPHILS # BLD AUTO: 0 K/UL (ref 0–0.1)
BASOPHILS # BLD: 0 % (ref 0–3)
BENZODIAZ UR QL: NEGATIVE
BILIRUB DIRECT SERPL-MCNC: 0.1 MG/DL (ref 0–0.2)
BILIRUB SERPL-MCNC: 0.4 MG/DL (ref 0.2–1)
BILIRUB UR QL: ABNORMAL
BUN SERPL-MCNC: 8 MG/DL (ref 7–18)
BUN/CREAT SERPL: 13 (ref 12–20)
CALCIUM SERPL-MCNC: 8.4 MG/DL (ref 8.5–10.1)
CANNABINOIDS UR QL SCN: NEGATIVE
CHLORIDE SERPL-SCNC: 105 MMOL/L (ref 100–108)
CO2 SERPL-SCNC: 22 MMOL/L (ref 21–32)
COCAINE UR QL SCN: NEGATIVE
COLOR UR: ABNORMAL
CREAT SERPL-MCNC: 0.61 MG/DL (ref 0.6–1.3)
DIFFERENTIAL METHOD BLD: ABNORMAL
EOSINOPHIL # BLD: 0 K/UL (ref 0–0.4)
EOSINOPHIL NFR BLD: 0 % (ref 0–5)
EPITH CASTS URNS QL MICRO: ABNORMAL /LPF (ref 0–5)
ERYTHROCYTE [DISTWIDTH] IN BLOOD BY AUTOMATED COUNT: 14.7 % (ref 11.6–14.5)
GLOBULIN SER CALC-MCNC: 3.7 G/DL (ref 2–4)
GLUCOSE SERPL-MCNC: 105 MG/DL (ref 74–99)
GLUCOSE UR STRIP.AUTO-MCNC: NEGATIVE MG/DL
HCG SERPL-ACNC: ABNORMAL MIU/ML (ref 1–6)
HCG UR QL: POSITIVE
HCT VFR BLD AUTO: 36.9 % (ref 35–45)
HDSCOM,HDSCOM: NORMAL
HGB BLD-MCNC: 13.1 G/DL (ref 12–16)
HGB UR QL STRIP: NEGATIVE
KETONES UR QL STRIP.AUTO: 40 MG/DL
LEUKOCYTE ESTERASE UR QL STRIP.AUTO: ABNORMAL
LYMPHOCYTES # BLD AUTO: 9 % (ref 20–51)
LYMPHOCYTES # BLD: 2 K/UL (ref 0.8–3.5)
MCH RBC QN AUTO: 29.8 PG (ref 24–34)
MCHC RBC AUTO-ENTMCNC: 35.5 G/DL (ref 31–37)
MCV RBC AUTO: 84.1 FL (ref 74–97)
METHADONE UR QL: NEGATIVE
MONOCYTES # BLD: 0.2 K/UL (ref 0–1)
MONOCYTES NFR BLD AUTO: 1 % (ref 2–9)
MUCOUS THREADS URNS QL MICRO: ABNORMAL /LPF
NEUTS SEG # BLD: 20.4 K/UL (ref 1.8–8)
NEUTS SEG NFR BLD AUTO: 90 % (ref 42–75)
NITRITE UR QL STRIP.AUTO: NEGATIVE
OPIATES UR QL: NEGATIVE
PCP UR QL: NEGATIVE
PH UR STRIP: 6.5 [PH] (ref 5–8)
PLATELET # BLD AUTO: 317 K/UL (ref 135–420)
PMV BLD AUTO: 10.3 FL (ref 9.2–11.8)
POTASSIUM SERPL-SCNC: 3.5 MMOL/L (ref 3.5–5.5)
PROT SERPL-MCNC: 7.4 G/DL (ref 6.4–8.2)
PROT UR STRIP-MCNC: 100 MG/DL
RBC # BLD AUTO: 4.39 M/UL (ref 4.2–5.3)
RBC #/AREA URNS HPF: NEGATIVE /HPF (ref 0–5)
RBC MORPH BLD: ABNORMAL
SODIUM SERPL-SCNC: 140 MMOL/L (ref 136–145)
SP GR UR REFRACTOMETRY: 1.03 (ref 1–1.03)
UROBILINOGEN UR QL STRIP.AUTO: 1 EU/DL (ref 0.2–1)
WBC # BLD AUTO: 22.6 K/UL (ref 4.6–13.2)
WBC URNS QL MICRO: ABNORMAL /HPF (ref 0–5)

## 2017-05-26 PROCEDURE — 80048 BASIC METABOLIC PNL TOTAL CA: CPT | Performed by: EMERGENCY MEDICINE

## 2017-05-26 PROCEDURE — 84702 CHORIONIC GONADOTROPIN TEST: CPT | Performed by: EMERGENCY MEDICINE

## 2017-05-26 PROCEDURE — 74011000258 HC RX REV CODE- 258: Performed by: EMERGENCY MEDICINE

## 2017-05-26 PROCEDURE — 96365 THER/PROPH/DIAG IV INF INIT: CPT

## 2017-05-26 PROCEDURE — 80076 HEPATIC FUNCTION PANEL: CPT | Performed by: EMERGENCY MEDICINE

## 2017-05-26 PROCEDURE — 85025 COMPLETE CBC W/AUTO DIFF WBC: CPT | Performed by: EMERGENCY MEDICINE

## 2017-05-26 PROCEDURE — 80307 DRUG TEST PRSMV CHEM ANLYZR: CPT | Performed by: EMERGENCY MEDICINE

## 2017-05-26 PROCEDURE — 81025 URINE PREGNANCY TEST: CPT

## 2017-05-26 PROCEDURE — 96367 TX/PROPH/DG ADDL SEQ IV INF: CPT

## 2017-05-26 PROCEDURE — 74011250636 HC RX REV CODE- 250/636: Performed by: EMERGENCY MEDICINE

## 2017-05-26 PROCEDURE — 99285 EMERGENCY DEPT VISIT HI MDM: CPT

## 2017-05-26 PROCEDURE — 96361 HYDRATE IV INFUSION ADD-ON: CPT

## 2017-05-26 PROCEDURE — 81001 URINALYSIS AUTO W/SCOPE: CPT | Performed by: EMERGENCY MEDICINE

## 2017-05-26 RX ORDER — ACETAMINOPHEN 10 MG/ML
1000 INJECTION, SOLUTION INTRAVENOUS
Status: COMPLETED | OUTPATIENT
Start: 2017-05-26 | End: 2017-05-26

## 2017-05-26 RX ORDER — MORPHINE SULFATE 4 MG/ML
4 INJECTION, SOLUTION INTRAMUSCULAR; INTRAVENOUS
Status: DISCONTINUED | OUTPATIENT
Start: 2017-05-26 | End: 2017-05-26

## 2017-05-26 RX ADMIN — ACETAMINOPHEN 1000 MG: 10 INJECTION, SOLUTION INTRAVENOUS at 16:23

## 2017-05-26 RX ADMIN — PROMETHAZINE HYDROCHLORIDE 12.5 MG: 25 INJECTION, SOLUTION INTRAMUSCULAR; INTRAVENOUS at 16:37

## 2017-05-26 RX ADMIN — SODIUM CHLORIDE 1000 ML: 9 INJECTION, SOLUTION INTRAVENOUS at 16:23

## 2017-05-26 NOTE — ED NOTES
Pt in full panic attack mode - unable to calm pt down- pt screaming at staff - obscenities - security in room - pt wants to see MD NOW . Very disruptive to ED and patients around her room. Pt hyperventilating - thrashing about in bed - requesting not to be left alone - yet when others in room w/ her - her behavior is escalating.

## 2017-05-26 NOTE — ED PROVIDER NOTES
HPI Comments: 3:55 PM   Shamika Salter is a G1A1P0 36 y.o. female who is 10 weeks pregnant with hx of endometriosis presenting with EMS to the ED C/O LLQ abd pain worsening this AM. Associated sx include nausea. Pt is seen by the [facility] for OB follow up. Pt has been taking percocet for 1 week and ran out yesterday. Denies vaginal bleeding and any other sx or complaints. The history is provided by the patient. No  was used. Past Medical History:   Diagnosis Date    Anxiety     Asthma     Depression     Endometriosis     Heroin abuse        Past Surgical History:   Procedure Laterality Date    HX DILATION AND CURETTAGE      HX LEEP PROCEDURE           No family history on file. Social History     Social History    Marital status: UNKNOWN     Spouse name: N/A    Number of children: N/A    Years of education: N/A     Occupational History    Not on file. Social History Main Topics    Smoking status: Current Every Day Smoker     Packs/day: 0.50    Smokeless tobacco: Not on file    Alcohol use Yes      Comment: Occasionally     Drug use: No    Sexual activity: Not on file     Other Topics Concern    Not on file     Social History Narrative         ALLERGIES: Review of patient's allergies indicates no known allergies. Review of Systems   Gastrointestinal: Positive for abdominal pain (LLQ) and nausea. Genitourinary: Negative for vaginal bleeding. All other systems reviewed and are negative. Vitals:    05/26/17 1511 05/26/17 1618 05/26/17 1711   BP: (!) 130/98 130/74 111/58   Pulse: (!) 111 70 67   Resp: 28 22 18   Temp: 97.5 °F (36.4 °C)     SpO2: 100% 100% 100%   Weight: 49.9 kg (110 lb)     Height: 5' (1.524 m)              Physical Exam   Constitutional: She is oriented to person, place, and time. She appears well-developed and well-nourished. Very histrionic. Somewhat agitated.  Would barely calm down for my exam. Fidgety and rocking back and forth in bed. HENT:   Head: Normocephalic and atraumatic. Eyes: Pupils are equal, round, and reactive to light. Neck: Neck supple. Cardiovascular: Normal rate, regular rhythm, S1 normal, S2 normal and normal heart sounds. Pulmonary/Chest: Breath sounds normal. No respiratory distress. She has no wheezes. She has no rales. She exhibits no tenderness. Abdominal: Soft. She exhibits no distension and no mass. There is no guarding. Very questionable tenderness in the suprapubic area. There would not be any tenderness to palpation when pt's attention is distracted. Musculoskeletal: Normal range of motion. She exhibits no edema or tenderness. Neurological: She is alert and oriented to person, place, and time. No cranial nerve deficit. Skin: No rash noted. Psychiatric: She has a normal mood and affect. Thought content normal.   Nursing note and vitals reviewed. RESULTS:    Pulse Oximetry Analysis - Normal 100% on room air     No orders to display        Labs Reviewed   URINALYSIS W/ RFLX MICROSCOPIC - Abnormal; Notable for the following:        Result Value    Protein 100 (*)     Ketone 40 (*)     Bilirubin SMALL (*)     Leukocyte Esterase SMALL (*)     All other components within normal limits   URINE MICROSCOPIC ONLY - Abnormal; Notable for the following:     Bacteria 2+ (*)     Mucus 2+ (*)     All other components within normal limits   CBC WITH AUTOMATED DIFF - Abnormal; Notable for the following:     WBC 22.6 (*)     RDW 14.7 (*)     NEUTROPHILS 90 (*)     LYMPHOCYTES 9 (*)     MONOCYTES 1 (*)     ABS.  NEUTROPHILS 20.4 (*)     All other components within normal limits   METABOLIC PANEL, BASIC - Abnormal; Notable for the following:     Glucose 105 (*)     Calcium 8.4 (*)     All other components within normal limits   TOTAL HCG, QT. - Abnormal; Notable for the following:     [place] [address] (*)     All other components within normal limits   HCG URINE, QL. - POC - Abnormal; Notable for the following:     Pregnancy test,urine (POC) POSITIVE (*)     All other components within normal limits   DRUG SCREEN, URINE   HEPATIC FUNCTION PANEL       No results found for this or any previous visit (from the past 12 hour(s)). MDM  Number of Diagnoses or Management Options  Diagnosis management comments: INITIAL CLINICAL IMPRESSION and PLANS:  The patient presents with the primary complaint(s) of: abd pain. The presentation, to include historical aspects and clinical findings are consistent with the DX of abd pain. However, other possible DX's to consider as primary, associated with, or exacerbated by include:    1.  Ectopic pregnany  2. Threatened AB  3. Miscarriage  4. Appendicitis   5. UTI  6. Kidney stone  7. Chronic pain    Considering the above, my initial management plan to evaluate and therapeutic interventions include the following as noted in the orders    Recorded by Zay Rowland ED Scribe, as dictated by Adri Perdue MD.         Amount and/or Complexity of Data Reviewed  Clinical lab tests: reviewed and ordered  Tests in the radiology section of CPT®: reviewed and ordered (US UTS Transvaginal)      ED Course     Medications   sodium chloride 0.9 % bolus infusion 1,000 mL (0 mL IntraVENous IV Completed 5/26/17 1806)   promethazine (PHENERGAN) 12.5 mg in 0.9% sodium chloride 50 mL IVPB (0 mg IntraVENous IV Completed 5/26/17 1658)   acetaminophen (OFIRMEV) infusion 1,000 mg (0 mg IntraVENous IV Completed 5/26/17 1700)       Procedures    PROGRESS NOTE:   3:55 PM  Initial assessment completed. Written by Zay Rowland ED Scribe, as dictated by Adri Perdue MD.     5  PROGRESS NOTE:    6:00 PM  This pt was fairly belligerent at arrival demanding pain medications. Despite the fact that I indicated to her that she's pregnant narcotics are probably not indicated at this point. Before trying Ofirmev, she was initially somewhat cooperative with this plan.  When I need to get an [address] and do a pelvic exam she became extremely angry and threatened to leave, demanding IV to be taken out. I had also reviewed her old medical records indicating two ultrasounds, one done here and another at an outpatient facility. Also reviewed to her an increased white count of 22,000. There was possibility that she may be withdrawing because she was demanding percocet on arrival. In the end it seemed she did not want anything done and walked out without signing her discharge papers and using profanities. Written by Naveed Mendez ED Scribe, as dictated by [employer], MD.      I informed the pt that she needed further workup and treatment for adequate evaluation for her abdominal pain, she is at risk for misdiagnosis and threatened . Pt is awake, alert, she understands her condition and the risks involved in leaving.   She is not clinically intoxicated and is able to make  medical decisions. She verbalized knowing same risks and understood it is recommended that she stay and could also return at any time. She was provided with warnings regarding worsening of her condition and was provided instructions to follow up with Ob/Gyn or PCP tomorrow. Attestations: This note is prepared by Naveed Mendez, acting as Scribe for [employer], MD.    [employer], MD: The scribe's documentation has been prepared under my direction and personally reviewed by me in its entirety. I confirm that the note above accurately reflects all work, treatment, procedures, and medical decision making performed by me.

## 2017-05-26 NOTE — ED NOTES
Pt's boyfriend has arrived - reports this is her typical behavior at home - pt w/ escalating voice/behavior w/ his presence - ambulated into bathroom w/o assistance.

## 2017-05-26 NOTE — ED NOTES
Pt w/ periods of calmness/ cooperativeness/apologizing for her behavior and then returns to screaming loudly and thrashing about.

## 2017-05-26 NOTE — PROGRESS NOTES
Went to get patient for Ultrasound. Patient too combative at this time, screaming and thrashing. Unable to perform Transvaginal ultrasound at this time.

## 2017-05-26 NOTE — ED NOTES
Pt had been lying quietly in bed - boyfriend had left to go to work - ultrasound arrived to  pt and pt's behavior began to escalate again - yelling/verbally abusive to staff in room w/ her - pt repeatedly asking for pain medications - dr Samuel Mathur in room w/ pt - discussing his concerns about her high WBC and need to have ultrasound to check out the pregnancy - pt did not want any further treatment - yelling for staff to remove the IV 'NOW\". INT removed. Pt left ED before signing refusal form. Security present in room w/ pt during this entire episode.

## 2017-05-26 NOTE — ED NOTES
Pt did not want security in her room - pt requesting me to stay in room and talk w/ her - then her behavior escalates. Pt reports \"I can't think because of my anxiety\". Intermittent periods of control and out of control behavior by pt. Pt requesting pain meds - IV tylenol up - pt requesting phenergan IV - coming from pharmacy.

## 2017-05-26 NOTE — ED TRIAGE NOTES
C/o left sided abd pain and vomiting today, denies bleeding, pt is 10-12 weeks preg per pt, states she has had an ultrasound to confirm preg is in her uterus. Pt states she has endometriosis, pt is yelling, thrashing around on stretcher, hyperventilating, throwing herself from one side of stretcher to the other. Pt has been out of percocet for 2-3 days and out of her ativan yesterday. Medics responded to the [facility] to transport this pt to ED. Sepsis Screening completed    (  )Patient meets SIRS criteria. ( x )Patient does not meet SIRS criteria.       SIRS Criteria is achieved when two or more of the following are present   Temperature < 96.8°F (36°C) or > 100.9°F (38.3°C)   Heart Rate > 90 beats per minute   Respiratory Rate > 20 breaths per minute   WBC count > 12,000 or <4,000 or > 10% bands

## 2018-01-18 ENCOUNTER — HOSPITAL ENCOUNTER (INPATIENT)
Age: 33
LOS: 2 days | Discharge: HOME | DRG: 781 | End: 2018-01-20
Attending: EMERGENCY MEDICINE | Admitting: HOSPITALIST
Payer: SELF-PAY

## 2018-01-18 ENCOUNTER — APPOINTMENT (OUTPATIENT)
Dept: ULTRASOUND IMAGING | Age: 33
DRG: 781 | End: 2018-01-18
Attending: PHYSICIAN ASSISTANT
Payer: SELF-PAY

## 2018-01-18 DIAGNOSIS — F41.1 ANXIETY STATE: ICD-10-CM

## 2018-01-18 DIAGNOSIS — E87.6 HYPOKALEMIA: ICD-10-CM

## 2018-01-18 DIAGNOSIS — N17.9 ACUTE RENAL INJURY (HCC): ICD-10-CM

## 2018-01-18 DIAGNOSIS — E86.0 DEHYDRATION: ICD-10-CM

## 2018-01-18 DIAGNOSIS — F19.91 HISTORY OF DRUG USE: ICD-10-CM

## 2018-01-18 DIAGNOSIS — O23.01 PYELONEPHRITIS AFFECTING PREGNANCY IN FIRST TRIMESTER: Primary | ICD-10-CM

## 2018-01-18 DIAGNOSIS — O21.9 NAUSEA AND VOMITING DURING PREGNANCY PRIOR TO 22 WEEKS GESTATION: ICD-10-CM

## 2018-01-18 PROBLEM — Z3A.01 5 WEEKS GESTATION OF PREGNANCY: Status: ACTIVE | Noted: 2018-01-18

## 2018-01-18 LAB
ABO + RH BLD: NORMAL
ALBUMIN SERPL-MCNC: 5.3 G/DL (ref 3.4–5)
ALBUMIN/GLOB SERPL: 1.2 {RATIO} (ref 0.8–1.7)
ALP SERPL-CCNC: 84 U/L (ref 45–117)
ALT SERPL-CCNC: 62 U/L (ref 13–56)
AMPHET UR QL SCN: NEGATIVE
ANION GAP SERPL CALC-SCNC: 16 MMOL/L (ref 3–18)
APPEARANCE UR: ABNORMAL
AST SERPL-CCNC: 36 U/L (ref 15–37)
BACTERIA URNS QL MICRO: ABNORMAL /HPF
BARBITURATES UR QL SCN: NEGATIVE
BASOPHILS # BLD: 0 K/UL (ref 0–0.1)
BASOPHILS NFR BLD: 0 % (ref 0–3)
BENZODIAZ UR QL: NEGATIVE
BILIRUB SERPL-MCNC: 1.1 MG/DL (ref 0.2–1)
BILIRUB UR QL: ABNORMAL
BUN SERPL-MCNC: 25 MG/DL (ref 7–18)
BUN/CREAT SERPL: 15 (ref 12–20)
CALCIUM SERPL-MCNC: 10.5 MG/DL (ref 8.5–10.1)
CANNABINOIDS UR QL SCN: NEGATIVE
CHLORIDE SERPL-SCNC: 91 MMOL/L (ref 100–108)
CO2 SERPL-SCNC: 26 MMOL/L (ref 21–32)
COCAINE UR QL SCN: NEGATIVE
COLOR UR: ABNORMAL
CREAT SERPL-MCNC: 1.67 MG/DL (ref 0.6–1.3)
DIFFERENTIAL METHOD BLD: ABNORMAL
EOSINOPHIL # BLD: 0.2 K/UL (ref 0–0.4)
EOSINOPHIL NFR BLD: 1 % (ref 0–5)
EPITH CASTS URNS QL MICRO: ABNORMAL /LPF (ref 0–5)
ERYTHROCYTE [DISTWIDTH] IN BLOOD BY AUTOMATED COUNT: 14.7 % (ref 11.6–14.5)
GLOBULIN SER CALC-MCNC: 4.3 G/DL (ref 2–4)
GLUCOSE SERPL-MCNC: 107 MG/DL (ref 74–99)
GLUCOSE UR STRIP.AUTO-MCNC: NEGATIVE MG/DL
HCG SERPL-ACNC: 179 MIU/ML (ref 1–6)
HCG UR QL: POSITIVE
HCT VFR BLD AUTO: 47.2 % (ref 35–45)
HDSCOM,HDSCOM: ABNORMAL
HGB BLD-MCNC: 16.3 G/DL (ref 12–16)
HGB UR QL STRIP: NEGATIVE
HYALINE CASTS URNS QL MICRO: ABNORMAL /LPF (ref 0–2)
KETONES UR QL STRIP.AUTO: 15 MG/DL
LACTATE SERPL-SCNC: 1 MMOL/L (ref 0.4–2)
LACTATE SERPL-SCNC: 2.8 MMOL/L (ref 0.4–2)
LEUKOCYTE ESTERASE UR QL STRIP.AUTO: ABNORMAL
LYMPHOCYTES # BLD: 3 K/UL (ref 0.8–3.5)
LYMPHOCYTES NFR BLD: 15 % (ref 20–51)
MAGNESIUM SERPL-MCNC: 1.9 MG/DL (ref 1.6–2.6)
MCH RBC QN AUTO: 28.6 PG (ref 24–34)
MCHC RBC AUTO-ENTMCNC: 34.5 G/DL (ref 31–37)
MCV RBC AUTO: 83 FL (ref 74–97)
METHADONE UR QL: NEGATIVE
MONOCYTES # BLD: 2.2 K/UL (ref 0–1)
MONOCYTES NFR BLD: 11 % (ref 2–9)
NEUTS BAND NFR BLD MANUAL: 1 % (ref 0–5)
NEUTS SEG # BLD: 14.5 K/UL (ref 1.8–8)
NEUTS SEG NFR BLD: 72 % (ref 42–75)
NITRITE UR QL STRIP.AUTO: POSITIVE
OPIATES UR QL: POSITIVE
PCP UR QL: NEGATIVE
PH UR STRIP: 5 [PH] (ref 5–8)
PLATELET # BLD AUTO: 356 K/UL (ref 135–420)
PMV BLD AUTO: 11.8 FL (ref 9.2–11.8)
POTASSIUM SERPL-SCNC: 3.1 MMOL/L (ref 3.5–5.5)
PROT SERPL-MCNC: 9.6 G/DL (ref 6.4–8.2)
PROT UR STRIP-MCNC: 300 MG/DL
RBC # BLD AUTO: 5.69 M/UL (ref 4.2–5.3)
RBC #/AREA URNS HPF: ABNORMAL /HPF (ref 0–5)
RBC MORPH BLD: ABNORMAL
SERVICE CMNT-IMP: NORMAL
SODIUM SERPL-SCNC: 133 MMOL/L (ref 136–145)
SP GR UR REFRACTOMETRY: 1.03 (ref 1–1.03)
UROBILINOGEN UR QL STRIP.AUTO: 1 EU/DL (ref 0.2–1)
WBC # BLD AUTO: 20.1 K/UL (ref 4.6–13.2)
WBC MORPH BLD: ABNORMAL
WBC URNS QL MICRO: ABNORMAL /HPF (ref 0–5)
WET PREP GENITAL: NORMAL

## 2018-01-18 PROCEDURE — 87040 BLOOD CULTURE FOR BACTERIA: CPT | Performed by: PHYSICIAN ASSISTANT

## 2018-01-18 PROCEDURE — 87491 CHLMYD TRACH DNA AMP PROBE: CPT | Performed by: PHYSICIAN ASSISTANT

## 2018-01-18 PROCEDURE — 74011000258 HC RX REV CODE- 258: Performed by: PHYSICIAN ASSISTANT

## 2018-01-18 PROCEDURE — 93005 ELECTROCARDIOGRAM TRACING: CPT

## 2018-01-18 PROCEDURE — 81025 URINE PREGNANCY TEST: CPT

## 2018-01-18 PROCEDURE — 83735 ASSAY OF MAGNESIUM: CPT | Performed by: PHYSICIAN ASSISTANT

## 2018-01-18 PROCEDURE — 76770 US EXAM ABDO BACK WALL COMP: CPT

## 2018-01-18 PROCEDURE — 84702 CHORIONIC GONADOTROPIN TEST: CPT | Performed by: PHYSICIAN ASSISTANT

## 2018-01-18 PROCEDURE — 96375 TX/PRO/DX INJ NEW DRUG ADDON: CPT

## 2018-01-18 PROCEDURE — 80053 COMPREHEN METABOLIC PANEL: CPT | Performed by: PHYSICIAN ASSISTANT

## 2018-01-18 PROCEDURE — 36415 COLL VENOUS BLD VENIPUNCTURE: CPT | Performed by: PHYSICIAN ASSISTANT

## 2018-01-18 PROCEDURE — 96365 THER/PROPH/DIAG IV INF INIT: CPT

## 2018-01-18 PROCEDURE — 85025 COMPLETE CBC W/AUTO DIFF WBC: CPT | Performed by: PHYSICIAN ASSISTANT

## 2018-01-18 PROCEDURE — 74011250637 HC RX REV CODE- 250/637: Performed by: PHYSICIAN ASSISTANT

## 2018-01-18 PROCEDURE — 87210 SMEAR WET MOUNT SALINE/INK: CPT | Performed by: PHYSICIAN ASSISTANT

## 2018-01-18 PROCEDURE — 65270000029 HC RM PRIVATE

## 2018-01-18 PROCEDURE — 76817 TRANSVAGINAL US OBSTETRIC: CPT

## 2018-01-18 PROCEDURE — 83605 ASSAY OF LACTIC ACID: CPT | Performed by: PHYSICIAN ASSISTANT

## 2018-01-18 PROCEDURE — 74011250636 HC RX REV CODE- 250/636: Performed by: PHYSICIAN ASSISTANT

## 2018-01-18 PROCEDURE — 86900 BLOOD TYPING SEROLOGIC ABO: CPT | Performed by: PHYSICIAN ASSISTANT

## 2018-01-18 PROCEDURE — 74011000250 HC RX REV CODE- 250: Performed by: PHYSICIAN ASSISTANT

## 2018-01-18 PROCEDURE — 96361 HYDRATE IV INFUSION ADD-ON: CPT

## 2018-01-18 PROCEDURE — 80307 DRUG TEST PRSMV CHEM ANLYZR: CPT | Performed by: PHYSICIAN ASSISTANT

## 2018-01-18 PROCEDURE — 87086 URINE CULTURE/COLONY COUNT: CPT | Performed by: PHYSICIAN ASSISTANT

## 2018-01-18 PROCEDURE — 81001 URINALYSIS AUTO W/SCOPE: CPT | Performed by: PHYSICIAN ASSISTANT

## 2018-01-18 PROCEDURE — 99285 EMERGENCY DEPT VISIT HI MDM: CPT

## 2018-01-18 RX ORDER — HEPARIN SODIUM 5000 [USP'U]/ML
5000 INJECTION, SOLUTION INTRAVENOUS; SUBCUTANEOUS EVERY 8 HOURS
Status: DISCONTINUED | OUTPATIENT
Start: 2018-01-18 | End: 2018-01-18

## 2018-01-18 RX ORDER — ONDANSETRON 2 MG/ML
4 INJECTION INTRAMUSCULAR; INTRAVENOUS
Status: COMPLETED | OUTPATIENT
Start: 2018-01-18 | End: 2018-01-18

## 2018-01-18 RX ORDER — POTASSIUM CHLORIDE 7.45 MG/ML
10 INJECTION INTRAVENOUS
Status: COMPLETED | OUTPATIENT
Start: 2018-01-18 | End: 2018-01-18

## 2018-01-18 RX ORDER — ACETAMINOPHEN 325 MG/1
650 TABLET ORAL
Status: DISCONTINUED | OUTPATIENT
Start: 2018-01-18 | End: 2018-01-20 | Stop reason: HOSPADM

## 2018-01-18 RX ORDER — MORPHINE SULFATE 10 MG/ML
8 INJECTION, SOLUTION INTRAMUSCULAR; INTRAVENOUS
Status: COMPLETED | OUTPATIENT
Start: 2018-01-18 | End: 2018-01-18

## 2018-01-18 RX ORDER — ONDANSETRON 4 MG/1
4 TABLET, ORALLY DISINTEGRATING ORAL
Status: DISCONTINUED | OUTPATIENT
Start: 2018-01-18 | End: 2018-01-19

## 2018-01-18 RX ORDER — ACETAMINOPHEN 10 MG/ML
1000 INJECTION, SOLUTION INTRAVENOUS ONCE
Status: COMPLETED | OUTPATIENT
Start: 2018-01-18 | End: 2018-01-18

## 2018-01-18 RX ORDER — SODIUM CHLORIDE 9 MG/ML
125 INJECTION, SOLUTION INTRAVENOUS CONTINUOUS
Status: DISCONTINUED | OUTPATIENT
Start: 2018-01-18 | End: 2018-01-20 | Stop reason: HOSPADM

## 2018-01-18 RX ORDER — MORPHINE SULFATE 2 MG/ML
1 INJECTION, SOLUTION INTRAMUSCULAR; INTRAVENOUS
Status: DISCONTINUED | OUTPATIENT
Start: 2018-01-18 | End: 2018-01-19

## 2018-01-18 RX ORDER — POTASSIUM CHLORIDE 20 MEQ/1
40 TABLET, EXTENDED RELEASE ORAL
Status: COMPLETED | OUTPATIENT
Start: 2018-01-18 | End: 2018-01-18

## 2018-01-18 RX ADMIN — WATER 1 G: 1 INJECTION INTRAMUSCULAR; INTRAVENOUS; SUBCUTANEOUS at 14:59

## 2018-01-18 RX ADMIN — POTASSIUM CHLORIDE 40 MEQ: 20 TABLET, EXTENDED RELEASE ORAL at 17:32

## 2018-01-18 RX ADMIN — SODIUM CHLORIDE 125 ML/HR: 900 INJECTION, SOLUTION INTRAVENOUS at 18:38

## 2018-01-18 RX ADMIN — POTASSIUM CHLORIDE 10 MEQ: 10 INJECTION, SOLUTION INTRAVENOUS at 14:42

## 2018-01-18 RX ADMIN — ONDANSETRON 4 MG: 2 INJECTION INTRAMUSCULAR; INTRAVENOUS at 14:59

## 2018-01-18 RX ADMIN — MORPHINE SULFATE 1 MG: 2 INJECTION, SOLUTION INTRAMUSCULAR; INTRAVENOUS at 20:49

## 2018-01-18 RX ADMIN — SODIUM CHLORIDE 1000 ML: 900 INJECTION, SOLUTION INTRAVENOUS at 13:58

## 2018-01-18 RX ADMIN — SODIUM CHLORIDE 1000 ML: 900 INJECTION, SOLUTION INTRAVENOUS at 17:33

## 2018-01-18 RX ADMIN — ONDANSETRON 4 MG: 4 TABLET, ORALLY DISINTEGRATING ORAL at 20:49

## 2018-01-18 RX ADMIN — PROMETHAZINE HYDROCHLORIDE 12.5 MG: 25 INJECTION, SOLUTION INTRAMUSCULAR; INTRAVENOUS at 14:11

## 2018-01-18 RX ADMIN — ACETAMINOPHEN 1000 MG: 10 INJECTION, SOLUTION INTRAVENOUS at 13:20

## 2018-01-18 RX ADMIN — SODIUM CHLORIDE 1000 ML: 900 INJECTION, SOLUTION INTRAVENOUS at 13:19

## 2018-01-18 RX ADMIN — MORPHINE SULFATE 8 MG: 10 INJECTION, SOLUTION INTRAMUSCULAR; INTRAVENOUS at 14:59

## 2018-01-18 NOTE — IP AVS SNAPSHOT
[address] [place] [id number] 
[phone] Patient: Kathy Jimenez MRN: [id number] DDL:[birth year] About your hospitalization You were admitted on:  January 18, 2018 You last received care in the:  [address] [place] [address] You were discharged on:  January 20, 2018 Why you were hospitalized Your primary diagnosis was:  Not on File Your diagnoses also included:  Dehydration, Hypokalemia, Acute Renal Injury (Hcc), Pyelonephritis Affecting Pregnancy In First Trimester, 5 Weeks Gestation Of Pregnancy Follow-up Information Follow up With Details Comments Contact Info Dena Gautam MD On 1/25/2018 Follow up appointment scheduled for Thursday, January 25, 2018 at 9:00 a.m. (no charge for visit - after hospitalization visit) [address] [place] [address] 
[phone] [facility] ([facility])  Chosen to continue managing your healthcare needs [facility] ([facility]) [address] [facility] [address] 
[phone] Dr. Armani Amos  In 1 week    
 pcm In 3 days None   None (395) Patient stated that they have no PCP Discharge Orders None A check stacey indicates which time of day the medication should be taken. My Medications START taking these medications Instructions Each Dose to Equal  
 Morning Noon Evening Bedtime  
 cephALEXin 500 mg capsule Commonly known as:  Farideh Richard Your last dose was: Your next dose is: Take 1 Cap by mouth four (4) times daily. 500 mg  
    
   
   
   
  
 prenatal vit-calcium-iron-fa 27 mg iron- 1 mg Tab Commonly known as:  PRENATAL PLUS with CALCIUM Start taking on:  1/21/2018 Your last dose was: Your next dose is: Take 1 Tab by mouth daily. 1 Tab Where to Get Your Medications These medications were sent to [address] [place] [address] - [address]  [address], [address] Hours:  M-F 930am-6pm Phone:  [phone]  
  prenatal vit-calcium-iron-fa 27 mg iron- 1 mg Tab Information on where to get these meds will be given to you by the nurse or doctor. ! Ask your nurse or doctor about these medications  
  cephALEXin 500 mg capsule Discharge Instructions Kidney Infection: Care Instructions Your Care Instructions A kidney infection (pyelonephritis) is a type of urinary tract infection, or UTI. Most UTIs are bladder infections. Kidney infections tend to make people much sicker than bladder infections do. A kidney infection is also more serious because it can cause lasting damage if it is not treated quickly. Follow-up care is a key part of your treatment and safety. Be sure to make and go to all appointments, and call your doctor if you are having problems. It's also a good idea to know your test results and keep a list of the medicines you take. How can you care for yourself at home? · Take your antibiotics as directed. Do not stop taking them just because you feel better. You need to take the full course of antibiotics. · Drink plenty of water, enough so that your urine is light yellow or clear like water. This may help wash out bacteria that are causing the infection. If you have kidney, heart, or liver disease and have to limit fluids, talk with your doctor before you increase the amount of fluids you drink. · Urinate often. Try to empty your bladder each time. · To relieve pain, take a hot shower or lay a heating pad (set on low) over your lower belly. Never go to sleep with a heating pad in place. Put a thin cloth between the heating pad and your skin. To help prevent kidney infections · Drink plenty of water each day.  This helps you urinate often, which clears bacteria from your system. If you have kidney, heart, or liver disease and have to limit fluids, talk with your doctor before you increase the amount of fluids you drink. · Urinate when you have the urge. Do not hold your urine for a long time. Urinate before you go to sleep. · If you have symptoms of a bladder infection, such as burning when you urinate or having to urinate often, call your doctor so you can treat the problem before it gets worse. If you do not treat a bladder infection quickly, it can spread to the kidney. · Men should keep the tip of the penis clean. If you are a woman, keep these ideas in mind: · Urinate right after you have sex. · Change sanitary pads often. Avoid douches, feminine hygiene sprays, and other feminine hygiene products that have deodorants. · After going to the bathroom, wipe from front to back. When should you call for help? Call your doctor now or seek immediate medical care if: 
? · You have symptoms that a kidney infection is getting worse. These may include: 
¨ Pain or burning when you urinate. ¨ A frequent need to urinate without being able to pass much urine. ¨ Pain in the flank, which is just below the rib cage and above the waist on either side of the back. ¨ Blood in the urine. ¨ A fever. ? · You are vomiting or nauseated. ? Watch closely for changes in your health, and be sure to contact your doctor if: 
? · You do not get better as expected. Where can you learn more? Go to http://shruti-pilo.info/. Enter P880 in the search box to learn more about \"Kidney Infection: Care Instructions. \" Current as of: May 12, 2017 Content Version: 11.4 © 3656-4185 [employer]. Care instructions adapted under license by [employer] (which disclaims liability or warranty for this information).  If you have questions about a medical condition or this instruction, always ask your healthcare professional. [employer] disclaims any warranty or liability for your use of this information. Patient armband removed and shredded DISCHARGE SUMMARY from Nurse PATIENT INSTRUCTIONS: 
 
 
F-face looks uneven A-arms unable to move or move unevenly S-speech slurred or non-existent T-time-call 911 as soon as signs and symptoms begin-DO NOT go Back to bed or wait to see if you get better-TIME IS BRAIN. Warning Signs of HEART ATTACK Call 911 if you have these symptoms: 
? Chest discomfort. Most heart attacks involve discomfort in the center of the chest that lasts more than a few minutes, or that goes away and comes back. It can feel like uncomfortable pressure, squeezing, fullness, or pain. ? Discomfort in other areas of the upper body. Symptoms can include pain or discomfort in one or both arms, the back, neck, jaw, or stomach. ? Shortness of breath with or without chest discomfort. ? Other signs may include breaking out in a cold sweat, nausea, or lightheadedness. Don't wait more than five minutes to call [address]! Fast action can save your life. Calling 911 is almost always the fastest way to get lifesaving treatment. Emergency Medical Services staff can begin treatment when they arrive  up to an hour sooner than if someone gets to the hospital by car. The discharge information has been reviewed with the patient. The patient verbalized understanding. Discharge medications reviewed with the patient and appropriate educational materials and side effects teaching were provided. ___________________________________________________________________________________________________________________________________ MyChart Announcement  We are excited to announce that we are making your provider's discharge notes available to you in [employer]. You will see these notes when they are completed and signed by the physician that discharged you from your recent hospital stay. If you have any questions or concerns about any information you see in [employer], please call the Health Information Department where you were seen or reach out to your Primary Care Provider for more information about your plan of care. Introducing [facility]! [facility] introduces [employer] patient portal. Now you can access parts of your medical record, email your doctor's office, and request medication refills online. 1. In your internet browser, go to [email or web address] 2. Click on the First Time User? Click Here link in the Sign In box. You will see the New Member Sign Up page. 3. Enter your [employer] Access Code exactly as it appears below. You will not need to use this code after youve completed the sign-up process. If you do not sign up before the expiration date, you must request a new code. · [employer] Access Code: [email or web address] Expires: 4/20/2018 11:41 AM 
 
4. Enter the last four digits of your Social Security Number (xxxx) and Date of Birth (mm/dd/yyyy) as indicated and click Submit. You will be taken to the next sign-up page. 5. Create a [employer] Online ID. This will be your [employer] Online login ID and cannot be changed, so think of one that is secure and easy to remember. 6. Create a [employer] Online password. You can change your password at any time. 7. Enter your Password Reset Question and Answer. This can be used at a later time if you forget your password. 8. Enter your e-mail address. You will receive e-mail notification when new information is available in [address] 9. Click Sign Up. You can now view and download portions of your medical record. 10. Click the Download Summary menu link to download a portable copy of your medical information. If you have questions, please visit the Frequently Asked Questions section of the MyChart website. Remember, VidPay[employer] is NOT to be used for urgent needs. For medical emergencies, dial 911. Now available from your iPhone and Android! Unresulted Labs-Please follow up with your PCP about these lab tests Order Current Status CULTURE, BLOOD Preliminary result EKG, 12 LEAD, INITIAL Preliminary result Providers Seen During Your Hospitalization Provider Specialty Primary office phone Nelda Acosta MD Emergency Medicine [phone] Immunizations Administered for This Admission Name Date Influenza Vaccine (Quad) PF 1/20/2018 Your Primary Care Physician (PCP) Primary Care Physician Office Phone Office Fax NONE ** None ** ** None ** You are allergic to the following No active allergies Recent Documentation Height Weight Breastfeeding? BMI OB Status Smoking Status 1.499 m 50 kg No 22.28 kg/m2 Having regular periods Current Every Day Smoker Emergency Contacts Name Discharge Info Relation Home Work Mobile Jyothi Alcala DISCHARGE CAREGIVER [3] Parent [1] [phone] Patient Belongings The following personal items are in your possession at time of discharge: 
  Dental Appliances: None  Visual Aid: None      Home Medications: None   Jewelry: Ring, Earrings (silver color x2 rings silver color with white stone x2 earin)  Clothing: Jacket/Coat, Pants, Shirt, Footwear    Other Valuables: Cell Phone, Fransico Kehr, With patient, Other (comment) (ID) Please provide this summary of care documentation to your next provider. Signatures-by signing, you are acknowledging that this After Visit Summary has been reviewed with you and you have received a copy. Patient Signature:  ____________________________________________________________ Date:  ____________________________________________________________  
  
Minnie Rogers Provider Signature:  ____________________________________________________________ Date:  ____________________________________________________________

## 2018-01-18 NOTE — CONSULTS
Ostetrical Consult    Subjective:     Date of Admission: 2018    Patient is a 29 y.o.   female admitted with flank pain, probable pyelonephritis. Early pregnancy, not previously known. . First pregnancy was an elective . Second pregnancy was a twin pregnancy, delivered at 32 weeks, 3 weeks following intrauterine fetal death of one twin. The pregnancy was an early miscarriage. She has one live child. Menses have been regular for the last year. Mostly Q 28. LMP was early Dec.  GA by LMP would therefore be 5-6wks. For PMH, see below. For Review of Systems, see ER notes    Past Medical History:   Diagnosis Date    Anxiety     Asthma     Drug use     Hepatitis C     Kidney infection       History reviewed. No pertinent surgical history. Prior to Admission medications    Not on File     No Known Allergies   Social History   Substance Use Topics    Smoking status: Current Every Day Smoker     Packs/day: 0.50     Years: 1.00    Smokeless tobacco: Never Used    Alcohol use Yes      History reviewed. No pertinent family history. Objective:     Blood pressure 123/81, pulse 75, temperature 98.3 °F (36.8 °C), resp. rate 27, height 4' 11\" (1.499 m), weight 52.2 kg (115 lb), SpO2 97 %, unknown if currently breastfeeding. Temp (24hrs), Av.3 °F (36.8 °C), Min:98.3 °F (36.8 °C), Max:98.3 °F (36.8 °C)                HEENT: No pallor, no jaundice, Thyroid and throat normal  RESPIRATORY: Clear to A & P  CVS: pulse reg, HS normal  ABDOMEN: Gravid. too early for fetal eval and for US eval.  Mild low pelvic tenderness, and obvious L flank pain. Pelvic: See pelvic US.  No tensderness on ER Dr Exam    Data Review:   Recent Results (from the past 24 hour(s))   URINALYSIS W/ RFLX MICROSCOPIC    Collection Time: 18 12:46 PM   Result Value Ref Range    Color DARK YELLOW      Appearance TURBID      Specific gravity 1.029 1.005 - 1.030      pH (UA) 5.0 5.0 - 8.0      Protein 300 (A) NEG mg/dL    Glucose NEGATIVE  NEG mg/dL    Ketone 15 (A) NEG mg/dL    Bilirubin MODERATE (A) NEG      Blood NEGATIVE  NEG      Urobilinogen 1.0 0.2 - 1.0 EU/dL    Nitrites POSITIVE (A) NEG      Leukocyte Esterase SMALL (A) NEG     DRUG SCREEN, URINE    Collection Time: 01/18/18 12:46 PM   Result Value Ref Range    BENZODIAZEPINES NEGATIVE  NEG      BARBITURATES NEGATIVE  NEG      THC (TH-CANNABINOL) NEGATIVE  NEG      OPIATES POSITIVE (A) NEG      PCP(PHENCYCLIDINE) NEGATIVE  NEG      COCAINE NEGATIVE  NEG      AMPHETAMINES NEGATIVE  NEG      METHADONE NEGATIVE  NEG      HDSCOM (NOTE)    URINE MICROSCOPIC ONLY    Collection Time: 01/18/18 12:46 PM   Result Value Ref Range    WBC 4 to 10 0 - 5 /hpf    RBC 0 to 3 0 - 5 /hpf    Epithelial cells 3+ 0 - 5 /lpf    Bacteria 4+ (A) NEG /hpf    Hyaline cast TOO NUMEROUS TO COUNT 0 - 2 /lpf   HCG URINE, QL. - POC    Collection Time: 01/18/18 12:55 PM   Result Value Ref Range    Pregnancy test,urine (POC) POSITIVE (A) NEG     CBC WITH AUTOMATED DIFF    Collection Time: 01/18/18  1:15 PM   Result Value Ref Range    WBC 20.1 (H) 4.6 - 13.2 K/uL    RBC 5.69 (H) 4.20 - 5.30 M/uL    HGB 16.3 (H) 12.0 - 16.0 g/dL    HCT 47.2 (H) 35.0 - 45.0 %    MCV 83.0 74.0 - 97.0 FL    MCH 28.6 24.0 - 34.0 PG    MCHC 34.5 31.0 - 37.0 g/dL    RDW 14.7 (H) 11.6 - 14.5 %    PLATELET 808 128 - 618 K/uL    MPV 11.8 9.2 - 11.8 FL    NEUTROPHILS 72 42 - 75 %    BAND NEUTROPHILS 1 0 - 5 %    LYMPHOCYTES 15 (L) 20 - 51 %    MONOCYTES 11 (H) 2 - 9 %    EOSINOPHILS 1 0 - 5 %    BASOPHILS 0 0 - 3 %    ABS. NEUTROPHILS 14.5 (H) 1.8 - 8.0 K/UL    ABS. LYMPHOCYTES 3.0 0.8 - 3.5 K/UL    ABS. MONOCYTES 2.2 (H) 0 - 1.0 K/UL    ABS. EOSINOPHILS 0.2 0.0 - 0.4 K/UL    ABS.  BASOPHILS 0.0 0.0 - 0.1 K/UL    RBC COMMENTS NORMOCYTIC, NORMOCHROMIC      WBC COMMENTS REACTIVE LYMPHS      DF MANUAL     METABOLIC PANEL, COMPREHENSIVE    Collection Time: 01/18/18  1:15 PM   Result Value Ref Range    Sodium 133 (L) 136 - 145 mmol/L    Potassium 3.1 (L) 3.5 - 5.5 mmol/L    Chloride 91 (L) 100 - 108 mmol/L    CO2 26 21 - 32 mmol/L    Anion gap 16 3.0 - 18 mmol/L    Glucose 107 (H) 74 - 99 mg/dL    BUN 25 (H) 7.0 - 18 MG/DL    Creatinine 1.67 (H) 0.6 - 1.3 MG/DL    BUN/Creatinine ratio 15 12 - 20      GFR est AA 44 (L) >60 ml/min/1.73m2    GFR est non-AA 36 (L) >60 ml/min/1.73m2    Calcium 10.5 (H) 8.5 - 10.1 MG/DL    Bilirubin, total 1.1 (H) 0.2 - 1.0 MG/DL    ALT (SGPT) 62 (H) 13 - 56 U/L    AST (SGOT) 36 15 - 37 U/L    Alk. phosphatase 84 45 - 117 U/L    Protein, total 9.6 (H) 6.4 - 8.2 g/dL    Albumin 5.3 (H) 3.4 - 5.0 g/dL    Globulin 4.3 (H) 2.0 - 4.0 g/dL    A-G Ratio 1.2 0.8 - 1.7     BETA HCG, QT    Collection Time: 01/18/18  1:15 PM   Result Value Ref Range    Beta HCG,  (H) 1.0 - 6.0 MIU/ML   BLOOD TYPE, (ABO+RH)    Collection Time: 01/18/18  1:15 PM   Result Value Ref Range    ABO/Rh(D) A NEGATIVE    LACTIC ACID    Collection Time: 01/18/18  2:35 PM   Result Value Ref Range    Lactic acid 2.8 (HH) 0.4 - 2.0 MMOL/L   WET PREP    Collection Time: 01/18/18  2:48 PM   Result Value Ref Range    Special Requests: NO SPECIAL REQUESTS      Wet prep NO YEAST,TRICHOMONAS OR CLUE CELLS NOTED           Assessment:     Active Problems:    5 weeks gestation of pregnancy (1/18/2018)      Dehydration (1/18/2018)      Hypokalemia (1/18/2018)      Acute renal injury ([address] (1/18/2018)      Pyelonephritis affecting pregnancy in first trimester (1/18/2018)        Plan: Thank you for the referral.  Understand that hospitalist will manage antibiotics. Will follow. Check labs:  Q HCG in 2 days  :    Disposition: will arrange followup    I saw and examined patient.     Signed By: Nesha Goins MD                         January 18, 2018

## 2018-01-18 NOTE — PROGRESS NOTES
Transvaginal ultrasound attempted. Procedure was explained to patient. Patient agreed to exam. During exam patient became violent, exam was ended for sonographer and patient safety. Provider was notified. Patient was transported back to ED by nurse.

## 2018-01-18 NOTE — H&P
Medicine History and Physical    Patient: Monse Medina   Age:  29 y.o. PCP:none    Chief Complaint:kidney stone    HPI:   Monse Medina is a 29 y.o. female who c/o N/V X 3 days. Hard to keep liquids down. No abd pain but has had low back pain. Thought she had another kidney stone like she had a few years ago. Denies current drug use but UDS is positive for opiates. She does drink ETOh, and she just found out in ER she is pregnant. Last menses was end of November. She has hx of 3 other pregnancies and 2 abortions. She has one living child. She has a hx of heroin use. She feels better after pain medicine and now wants water and ice. She states she stopped her anxiety meds a month ago as her medicaid ran out. She is here with a biyfriend she lives with. She adamantly denies current drug use/IVDA. She has Hep C which is untreated. Past Medical History:  Past Medical History:   Diagnosis Date    Anxiety     Asthma     Drug use     Hepatitis C     Kidney infection        Past Surgical History:  History reviewed. No pertinent surgical history.     Family History:  Kidney stones    Social History:  Social History     Social History    Marital status: SINGLE     Spouse name: N/A    Number of children: N/A    Years of education: N/A     Social History Main Topics    Smoking status: Current Every Day Smoker     Packs/day: 0.50     Years: 1.00    Smokeless tobacco: Never Used    Alcohol use Yes    Drug use: Yes     Special: Other, Heroin      Comment: spice    Sexual activity: Yes     Birth control/ protection: None     Other Topics Concern    None     Social History Narrative       Home Medications:none  Prior to Admission medications    Not on File       Allergies:  No Known Allergies      Review of systems:    Constitutional:no fever, positive chills  HEENT:no earache, no sore throat, no rhinitis, no visual changes or eye pain  Neuro:no headaches, no dizziness, no lightheadedness, no syncope, no seizures  CVS:no chest pain, no palpitations, no leg edema  Resp; no Shortness of breath, no cough, no wheezing  GI:nausea. vomiting for 3 days, hard to keep liquids down, no abd pain, has left flank pain  :no dysuria, no urinary hesitancy, no hematuria  Heme:no bleeding or easy bruisability  Musculoskeletal:no myalgias or arthralgias  Endocrine:no heat or cold intolerance, no hair loss, no skin changes  Skin:no rashes or new lesions on skin      Physical Exam:     Visit Vitals    /81    Pulse 85    Temp 98.3 °F (36.8 °C)    Resp 17    Ht 4' 11\" (1.499 m)    Wt 52.2 kg (115 lb)    SpO2 97%    BMI 23.23 kg/m2       Physical Exam:  General appearance: alert, cooperative, no distress, appears stated age  Head: Normocephalic, without obvious abnormality, atraumatic  Neck: supple, trachea midline  Lungs: clear to auscultation bilaterally  Heart: regular rate and rhythm, S1, S2 normal, no murmur, click, rub or gallop  Abdomen: soft, non-tender.  Bowel sounds normal. No masses,  no organomegaly  Extremities: extremities normal, atraumatic, no cyanosis or edema  Skin: Skin color, texture, turgor normal. No rashes or lesions  Neurologic: Grossly normal        Lab/Data Reviewed:  [unfilled]        Assessment/Plan   Active Problems:    Dehydration (1/18/2018)      Hypokalemia (1/18/2018)      Acute renal injury ([address] (1/18/2018)      Pyelonephritis affecting pregnancy in first trimester (1/18/2018)    pyelo  Pregnancy  Sepsis, mild  Dehydration  THAI    Continue with IVF resuscitation, BP is stable, she is not tachycardic, repeat lactate  OB to see her also  Continue IV rocephin daily  DVT prophylaxis-SCD's  Repeat labs in am  Pain control and antiemetics PRN    Expected LOS 1-2 days

## 2018-01-18 NOTE — ED NOTES
TRANSFER - OUT REPORT:    Verbal report given to [facility] [place] RN(name) on J Luis Albarran  being transferred to Room 329(unit) for routine progression of care       Report consisted of patients Situation, Background, Assessment and   Recommendations(SBAR). Information from the following report(s) SBAR, Kardex and MAR was reviewed with the receiving nurse. Lines:   Peripheral IV 01/18/18 Right Antecubital (Active)   Dressing Status Clean, dry, & intact 1/18/2018  1:15 PM   Dressing Type Transparent 1/18/2018  1:15 PM   Hub Color/Line Status Pink;Flushed 1/18/2018  1:15 PM   Action Taken Blood drawn 1/18/2018  1:15 PM        Opportunity for questions and clarification was provided.       Patient transported with:   [employer]

## 2018-01-18 NOTE — ED NOTES
Patient notified by Denzel Cortez that her pregnancy test was positive. Pt had hysterical reaction to test results and began to scream uncontrollably. Pt with continued screaming anxious behavior at varying intervals. Robert 25 at bedside to  patient for ultrasound. Pt screaming and counting. Pt stated she would calm down for her ultrasound if she could eat ice chips. Pt given ice chips. Pt ambulated to wheelchair and went to ultrasound.

## 2018-01-18 NOTE — ED PROVIDER NOTES
EMERGENCY DEPARTMENT HISTORY AND PHYSICAL EXAM    Date: 2018  Patient Name: Ayesha Albarado    History of Presenting Illness     Chief Complaint   Patient presents with    Flank Pain         History Provided By: Patient    Chief Complaint: flank pain  Duration: 1-2 Days  Timing:  Intermittent  Location: left  Associated Symptoms:  vomiting, LLQ abdominal pain, and decreasing appetite    Additional History (Context):   12:57 PM  Ayesha Albarado is a 29 y.o. female who presents to the emergency department via EMS C/O intermittent left flank pain onset 1-2 days ago. Associated symptoms include vomiting, LLQ abdominal pain, and decreasing appetite. LMP 2 months ago; appropriately last week of November. [id number];  and miscarriage. Patient was followed by [facility] for previous pregnancy. Last Heroin usage 1.5 years ago. Pmhx ovarian cyst. Pt denies any other Sx or complaints. PCP: None        Past History     Past Medical History:  Past Medical History:   Diagnosis Date    Anxiety     Asthma     Drug use     Hepatitis C     Kidney infection        Past Surgical History:  History reviewed. No pertinent surgical history. Family History:  History reviewed. No pertinent family history. Social History:  Social History   Substance Use Topics    Smoking status: Current Every Day Smoker     Packs/day: 0.50     Years: 1.00    Smokeless tobacco: Never Used    Alcohol use Yes       Allergies:  No Known Allergies      Review of Systems   Review of Systems   Constitutional: Positive for appetite change (decreased) and fever. Negative for chills. Respiratory: Negative for cough and shortness of breath. Cardiovascular: Negative for chest pain. Gastrointestinal: Positive for abdominal pain (LLQ), nausea and vomiting. Negative for abdominal distention. Genitourinary: Positive for flank pain (left ).  Negative for difficulty urinating, dysuria, frequency, hematuria, urgency, vaginal bleeding and vaginal discharge. Musculoskeletal: Positive for back pain. Skin: Negative for rash. Neurological: Negative for light-headedness. Psychiatric/Behavioral: The patient is nervous/anxious. All other systems reviewed and are negative. Physical Exam     Vitals:    01/18/18 1245 01/18/18 1623 01/18/18 1701 01/18/18 1702   BP: (!) 131/96 123/81     Pulse: (!) 116 85 87 75   Resp: 16 17 17 27   Temp: 98.3 °F (36.8 °C)      SpO2: 100% 97%  97%   Weight: 52.2 kg (115 lb)      Height: 4' 11\" (1.499 m)        Physical Exam   Constitutional: She is oriented to person, place, and time. She appears well-developed and well-nourished. No distress.  female that is very anxious. Screams at times. Acting hysterical at times but able to redirect. HENT:   Head: Normocephalic and atraumatic. Right Ear: External ear normal.   Left Ear: External ear normal.   Nose: Nose normal. Right sinus exhibits no maxillary sinus tenderness and no frontal sinus tenderness. Left sinus exhibits no maxillary sinus tenderness and no frontal sinus tenderness. Mouth/Throat: Uvula is midline and oropharynx is clear and moist. Mucous membranes are dry. No uvula swelling. Eyes: Conjunctivae are normal. No scleral icterus. Neck: Normal range of motion. Cardiovascular: Regular rhythm, normal heart sounds and intact distal pulses. Tachycardia present. Exam reveals no gallop and no friction rub. No murmur heard. Tachy at 116   Pulmonary/Chest: Effort normal and breath sounds normal. No accessory muscle usage. No tachypnea. No respiratory distress. She has no decreased breath sounds. She has no wheezes. She has no rhonchi. She has no rales. Abdominal: Soft. Normal appearance and bowel sounds are normal. She exhibits no ascites and no mass. There is tenderness (mild left sided pelvic pain, most TTP to left flank). There is CVA tenderness (left sided).  There is no rigidity, no guarding, no tenderness at McBurney's point and negative Gunter's sign. Musculoskeletal: Normal range of motion. She exhibits no edema or tenderness. Neurological: She is alert and oriented to person, place, and time. Skin: Skin is warm and dry. No rash noted. She is not diaphoretic. No erythema. Psychiatric: Judgment normal. Her mood appears anxious. She is agitated. Hysterical at times. Screaming     Nursing note and vitals reviewed.         Diagnostic Study Results     Labs -     Recent Results (from the past 12 hour(s))   URINALYSIS W/ RFLX MICROSCOPIC    Collection Time: 01/18/18 12:46 PM   Result Value Ref Range    Color DARK YELLOW      Appearance TURBID      Specific gravity 1.029 1.005 - 1.030      pH (UA) 5.0 5.0 - 8.0      Protein 300 (A) NEG mg/dL    Glucose NEGATIVE  NEG mg/dL    Ketone 15 (A) NEG mg/dL    Bilirubin MODERATE (A) NEG      Blood NEGATIVE  NEG      Urobilinogen 1.0 0.2 - 1.0 EU/dL    Nitrites POSITIVE (A) NEG      Leukocyte Esterase SMALL (A) NEG     DRUG SCREEN, URINE    Collection Time: 01/18/18 12:46 PM   Result Value Ref Range    BENZODIAZEPINES NEGATIVE  NEG      BARBITURATES NEGATIVE  NEG      THC (TH-CANNABINOL) NEGATIVE  NEG      OPIATES POSITIVE (A) NEG      PCP(PHENCYCLIDINE) NEGATIVE  NEG      COCAINE NEGATIVE  NEG      AMPHETAMINES NEGATIVE  NEG      METHADONE NEGATIVE  NEG      HDSCOM (NOTE)    URINE MICROSCOPIC ONLY    Collection Time: 01/18/18 12:46 PM   Result Value Ref Range    WBC 4 to 10 0 - 5 /hpf    RBC 0 to 3 0 - 5 /hpf    Epithelial cells 3+ 0 - 5 /lpf    Bacteria 4+ (A) NEG /hpf    Hyaline cast TOO NUMEROUS TO COUNT 0 - 2 /lpf   HCG URINE, QL. - POC    Collection Time: 01/18/18 12:55 PM   Result Value Ref Range    Pregnancy test,urine (POC) POSITIVE (A) NEG     CBC WITH AUTOMATED DIFF    Collection Time: 01/18/18  1:15 PM   Result Value Ref Range    WBC 20.1 (H) 4.6 - 13.2 K/uL    RBC 5.69 (H) 4.20 - 5.30 M/uL    HGB 16.3 (H) 12.0 - 16.0 g/dL    HCT 47.2 (H) 35.0 - 45.0 %    MCV 83.0 74.0 - 97.0 FL MCH 28.6 24.0 - 34.0 PG    MCHC 34.5 31.0 - 37.0 g/dL    RDW 14.7 (H) 11.6 - 14.5 %    PLATELET 731 444 - 232 K/uL    MPV 11.8 9.2 - 11.8 FL    NEUTROPHILS 72 42 - 75 %    BAND NEUTROPHILS 1 0 - 5 %    LYMPHOCYTES 15 (L) 20 - 51 %    MONOCYTES 11 (H) 2 - 9 %    EOSINOPHILS 1 0 - 5 %    BASOPHILS 0 0 - 3 %    ABS. NEUTROPHILS 14.5 (H) 1.8 - 8.0 K/UL    ABS. LYMPHOCYTES 3.0 0.8 - 3.5 K/UL    ABS. MONOCYTES 2.2 (H) 0 - 1.0 K/UL    ABS. EOSINOPHILS 0.2 0.0 - 0.4 K/UL    ABS. BASOPHILS 0.0 0.0 - 0.1 K/UL    RBC COMMENTS NORMOCYTIC, NORMOCHROMIC      WBC COMMENTS REACTIVE LYMPHS      DF MANUAL     METABOLIC PANEL, COMPREHENSIVE    Collection Time: 01/18/18  1:15 PM   Result Value Ref Range    Sodium 133 (L) 136 - 145 mmol/L    Potassium 3.1 (L) 3.5 - 5.5 mmol/L    Chloride 91 (L) 100 - 108 mmol/L    CO2 26 21 - 32 mmol/L    Anion gap 16 3.0 - 18 mmol/L    Glucose 107 (H) 74 - 99 mg/dL    BUN 25 (H) 7.0 - 18 MG/DL    Creatinine 1.67 (H) 0.6 - 1.3 MG/DL    BUN/Creatinine ratio 15 12 - 20      GFR est AA 44 (L) >60 ml/min/1.73m2    GFR est non-AA 36 (L) >60 ml/min/1.73m2    Calcium 10.5 (H) 8.5 - 10.1 MG/DL    Bilirubin, total 1.1 (H) 0.2 - 1.0 MG/DL    ALT (SGPT) 62 (H) 13 - 56 U/L    AST (SGOT) 36 15 - 37 U/L    Alk.  phosphatase 84 45 - 117 U/L    Protein, total 9.6 (H) 6.4 - 8.2 g/dL    Albumin 5.3 (H) 3.4 - 5.0 g/dL    Globulin 4.3 (H) 2.0 - 4.0 g/dL    A-G Ratio 1.2 0.8 - 1.7     BETA HCG, QT    Collection Time: 01/18/18  1:15 PM   Result Value Ref Range    Beta HCG,  (H) 1.0 - 6.0 MIU/ML   BLOOD TYPE, (ABO+RH)    Collection Time: 01/18/18  1:15 PM   Result Value Ref Range    ABO/Rh(D) A NEGATIVE    LACTIC ACID    Collection Time: 01/18/18  2:35 PM   Result Value Ref Range    Lactic acid 2.8 (HH) 0.4 - 2.0 MMOL/L   WET PREP    Collection Time: 01/18/18  2:48 PM   Result Value Ref Range    Special Requests: NO SPECIAL REQUESTS      Wet prep NO YEAST,TRICHOMONAS OR CLUE CELLS NOTED         Radiologic Studies -   US RETROPERITONEUM COMP   Final Result    IMPRESSION:     Ureteric jets are not visualized but no hydronephrosis or sonographic evidence  for urolithiasis. As read by the radiologist.      Marlene Melendrez OB   Final Result  IMPRESSION:     1. This examination was terminated, secondary to patient compliance.        2. Pregnancy of unknown location. No sonographically identified IUP, gestational  sac or saclike structure. This is not an unexpected finding given the provided  beta-hCG value of 179. Requires a CT and ultrasound follow-up and a definitive  diagnosis.       3. Right ovarian complex lesion, potentially corpus luteum.     As read by the radiologist.          [address] Results  (Last 48 hours)    None        CXR Results  (Last 48 hours)    None          Medications given in the ED-  Medications   sodium chloride 0.9 % bolus infusion 1,000 mL (1,000 mL IntraVENous New Bag 1/18/18 1733)   0.9% sodium chloride infusion (not administered)   acetaminophen (TYLENOL) tablet 650 mg (not administered)   morphine injection 1 mg (not administered)   ondansetron (ZOFRAN ODT) tablet 4 mg (not administered)   cefTRIAXone (ROCEPHIN) 2 g in sterile water (preservative free) 20 mL IV syringe (0 g IntraVENous Held 1/18/18 1710)   promethazine (PHENERGAN) 12.5 mg in 0.9% sodium chloride 50 mL IVPB (0 mg IntraVENous IV Completed 1/18/18 1426)   sodium chloride 0.9 % bolus infusion 1,000 mL (0 mL IntraVENous IV Completed 1/18/18 1530)   acetaminophen (OFIRMEV) infusion 1,000 mg (0 mg IntraVENous IV Completed 1/18/18 1335)   sodium chloride 0.9 % bolus infusion 1,000 mL (1,000 mL IntraVENous New Bag 1/18/18 1358)   potassium chloride 10 mEq in 100 ml IVPB (0 mEq IntraVENous IV Completed 1/18/18 1542)   cefTRIAXone (ROCEPHIN) 1 g in sterile water (preservative free) 10 mL IV syringe (1 g IntraVENous Given 1/18/18 1459)   morphine injection 8 mg (8 mg IntraVENous Given 1/18/18 1459)   ondansetron (ZOFRAN) injection 4 mg (4 mg IntraVENous Given 1/18/18 9499)   potassium chloride (K-DUR, KLOR-CON) SR tablet 40 mEq (40 mEq Oral Given 1/18/18 1732)         Medical Decision Making   I am the first provider for this patient. I reviewed the vital signs, available nursing notes, past medical history, past surgical history, family history and social history. Vital Signs-Reviewed the patient's vital signs. Pulse Oximetry Analysis - 100% on RA     EKG interpretation: (Preliminary)  Rate 81 bpm. Rhythm: NSR with sinus arrhythmia. No ST elevation  EKG read by Nano Higginbotham PA-C at 4:30 PM    Records Reviewed: Nursing Notes and Old Medical Records    Procedures:  Pelvic Exam  Date/Time: 1/18/2018 3:00 PM  Performed by: PA  Procedure duration:  15 minutes. Documented by:  Marek Gilliam. As dictated by: AILYN aVughn  Exam assisted by:  Female chaperone in rom. Type of exam performed: bimanual and speculum. External genitalia appearance: normal.    Vaginal exam:  discharge. The amount of discharge was:  moderate. The discharge was white and thick. Cervical exam:  no cervical motion tenderness and os closed. Specimen(s) collected:  chlamydia, GC and vaginal culture. Bimanual exam:  normal.    Patient tolerance: Patient tolerated the procedure well with no immediate complications                ED Course:   12:57 PM Initial assessment performed. The patients presenting problems have been discussed, and they are in agreement with the care plan formulated and outlined with them. I have encouraged them to ask questions as they arise throughout their visit. 2:20 PM Notified by Ultrasound tech, attempt transvaginal US but patient was thrashing around in bed screaming. Unable to complete exam and RN had to go retrieve patient from [employer]. Patient is constantly screaming and thrashing in bed. I am familiar with pt from 9/2015 visit for separate problem. Her reactions were similar at that time. HCG has returned at 179. Potassium low at 3.1 and pt is quiet dehydrated. BUN of 25 and elevated creatinine of 1.67. WBC 20,000. Pt has had similar elevate WBC numerous times on chart review. Plan is to aggressive hydrate and replace potassium . Will need to perform pelvic exam. UA is consistent with infection with positive nitrates. Will discuss with attending. Will need to start sepsis protocol with lactate and blood culture to be drawn. SEPSIS ASSESSMENT NOTE:   2:30 PM  Oracio Weston PA-C has seen and assessed the patient as follows:    Capillary Refill:normal/brisk  Cardiopulmonary Evaluation:   Lung Sounds: clear   Cardiac Sounds: tachy, regular  Peripheral Pulses: present  Skin Exam: pink, warm     Visit Vitals    /81    Pulse 85    Temp 98.3 °F (36.8 °C)    Resp 17    Ht 4' 11\" (1.499 m)    Wt 52.2 kg (115 lb)    SpO2 97%    BMI 23.23 kg/m2         2:40 PM 3:14 PM Discussed patient's history, exam, and available diagnostics results with Edy Blackmon MD, ED attending, who agree US retroperitoneal to assess for [facility]. Agrees with antibiotics, lactic, and blood culture. Patient still hysterical, thrashing around in bed, and acting very inappropriate. Patient boyfriend is here. Cannot give Hydrol in early pregnancy. Will attempt pelvic exam. Pt may need admission given findings to date. 3:10 After numerous discussion about risk of narcotic pain medications in pregnancy include category C of Morphine, patient is requesting pain medication. Will give Morphine so we can facilitate US. On pelvic exam, no CMT and no adnexal tenderness. No bleeding however white discharge. Pt agrees to stay still for US after pain control. 4:31 PM Discussed patient's history, exam, and available diagnostics results with Acosta Aguila MD, hospitalist, who agree to admit patient to medical.     4:37 PM Discussed patient's history, exam, and available diagnostics results with Michelle Shine MD (ObGYN), who agree with workup.  Will consult. 5:46 PM  Per RN Geri Martinez, floor RN understands to repeat lactate. Diagnosis and Disposition       Critical Care Time:   I have spent 32 minutes of critical care time involved in lab review, consultations with specialist, family decision-making, and documentation. During this entire length of time I was immediately available to the patient. Critical Care: The reason for providing this level of medical care for this critically ill patient was due a critical illness that impaired one or more vital organ systems such that there was a high probability of imminent or life threatening deterioration in the patients condition. This care involved high complexity decision making to assess, manipulate, and support vital system functions, to treat this degreee vital organ system failure and to prevent further life threatening deterioration of the patients condition. Core Measures:  For Hospitalized Patients:    1. Hospitalization Decision Time:  The decision to hospitalize the patient was made by AscenzDACIA at 4:30 PM on 1/18/2018    2. Aspirin: Aspirin was not given because the patient did not present with a stroke at the time of their Emergency Department evaluation    4:38 PM  Patient is being admitted to the hospital by Santiago Titus MD. The results of their tests and reasons for their admission have been discussed with them and/or available family. They convey agreement and understanding for the need to be admitted and for their admission diagnosis. CONDITIONS ON ADMISSION:  Sepsis is present at the time of admission. Deep Vein Thrombosis is not present at the time of admission. Thrombosis is not present at the time of admission. Urinary Tract Infection is present at the time of admission. Pneumonia is not present at the time of admission. MRSA is not present at the time of admission. Wound infection is not present at the time of admission.  Pressure Ulcer is not present at the time of admission. CLINICAL IMPRESSION:    1. Pyelonephritis affecting pregnancy in first trimester    2. Hypokalemia    3. Acute renal injury ([address]    4. Dehydration    5. Nausea and vomiting during pregnancy prior to 22 weeks gestation    6. Anxiety state    7. History of drug use        PLAN:  1. Admission  _______________________________    Attestations: This note is prepared by Sarah Tan, acting as Scribe for Scott Franco PA-C. Scott Franco PA-C:  The scribe's documentation has been prepared under my direction and personally reviewed by me in its entirety.   I confirm that the note above accurately reflects all work, treatment, procedures, and medical decision making performed by me.  _______________________________

## 2018-01-19 LAB
ANION GAP SERPL CALC-SCNC: 9 MMOL/L (ref 3–18)
BASOPHILS # BLD: 0 K/UL (ref 0–0.1)
BASOPHILS NFR BLD: 0 % (ref 0–3)
BUN SERPL-MCNC: 10 MG/DL (ref 7–18)
BUN/CREAT SERPL: 17 (ref 12–20)
C TRACH RRNA SPEC QL NAA+PROBE: NEGATIVE
CALCIUM SERPL-MCNC: 8.1 MG/DL (ref 8.5–10.1)
CHLORIDE SERPL-SCNC: 110 MMOL/L (ref 100–108)
CO2 SERPL-SCNC: 23 MMOL/L (ref 21–32)
CREAT SERPL-MCNC: 0.6 MG/DL (ref 0.6–1.3)
DIFFERENTIAL METHOD BLD: ABNORMAL
EOSINOPHIL # BLD: 0.1 K/UL (ref 0–0.4)
EOSINOPHIL NFR BLD: 1 % (ref 0–5)
ERYTHROCYTE [DISTWIDTH] IN BLOOD BY AUTOMATED COUNT: 15 % (ref 11.6–14.5)
GLUCOSE SERPL-MCNC: 84 MG/DL (ref 74–99)
HCT VFR BLD AUTO: 35 % (ref 35–45)
HGB BLD-MCNC: 11.9 G/DL (ref 12–16)
LYMPHOCYTES # BLD: 5.4 K/UL (ref 0.8–3.5)
LYMPHOCYTES NFR BLD: 42 % (ref 20–51)
MCH RBC QN AUTO: 28.5 PG (ref 24–34)
MCHC RBC AUTO-ENTMCNC: 34 G/DL (ref 31–37)
MCV RBC AUTO: 83.7 FL (ref 74–97)
MONOCYTES # BLD: 1 K/UL (ref 0–1)
MONOCYTES NFR BLD: 8 % (ref 2–9)
N GONORRHOEA RRNA SPEC QL NAA+PROBE: NEGATIVE
NEUTS BAND NFR BLD MANUAL: 1 % (ref 0–5)
NEUTS SEG # BLD: 6.1 K/UL (ref 1.8–8)
NEUTS SEG NFR BLD: 48 % (ref 42–75)
PLATELET # BLD AUTO: 252 K/UL (ref 135–420)
PLATELET COMMENTS,PCOM: ABNORMAL
PMV BLD AUTO: 11.3 FL (ref 9.2–11.8)
POTASSIUM SERPL-SCNC: 3.9 MMOL/L (ref 3.5–5.5)
RBC # BLD AUTO: 4.18 M/UL (ref 4.2–5.3)
RBC MORPH BLD: ABNORMAL
SODIUM SERPL-SCNC: 142 MMOL/L (ref 136–145)
SPECIMEN SOURCE: NORMAL
WBC # BLD AUTO: 12.8 K/UL (ref 4.6–13.2)
WBC MORPH BLD: ABNORMAL

## 2018-01-19 PROCEDURE — 74011000250 HC RX REV CODE- 250: Performed by: PHYSICIAN ASSISTANT

## 2018-01-19 PROCEDURE — 74011000258 HC RX REV CODE- 258: Performed by: HOSPITALIST

## 2018-01-19 PROCEDURE — 36415 COLL VENOUS BLD VENIPUNCTURE: CPT | Performed by: PHYSICIAN ASSISTANT

## 2018-01-19 PROCEDURE — 65270000029 HC RM PRIVATE

## 2018-01-19 PROCEDURE — 74011250636 HC RX REV CODE- 250/636: Performed by: HOSPITALIST

## 2018-01-19 PROCEDURE — 74011250636 HC RX REV CODE- 250/636: Performed by: PHYSICIAN ASSISTANT

## 2018-01-19 PROCEDURE — 74011250637 HC RX REV CODE- 250/637: Performed by: PHYSICIAN ASSISTANT

## 2018-01-19 PROCEDURE — 85025 COMPLETE CBC W/AUTO DIFF WBC: CPT | Performed by: PHYSICIAN ASSISTANT

## 2018-01-19 PROCEDURE — 80048 BASIC METABOLIC PNL TOTAL CA: CPT | Performed by: PHYSICIAN ASSISTANT

## 2018-01-19 PROCEDURE — 74011250637 HC RX REV CODE- 250/637: Performed by: HOSPITALIST

## 2018-01-19 RX ORDER — DIPHENHYDRAMINE HYDROCHLORIDE 50 MG/ML
12.5 INJECTION, SOLUTION INTRAMUSCULAR; INTRAVENOUS
Status: DISCONTINUED | OUTPATIENT
Start: 2018-01-19 | End: 2018-01-20 | Stop reason: HOSPADM

## 2018-01-19 RX ORDER — CEPHALEXIN 500 MG/1
500 CAPSULE ORAL 4 TIMES DAILY
Qty: 28 CAP | Refills: 0 | Status: SHIPPED | OUTPATIENT
Start: 2018-01-19 | End: 2018-01-20

## 2018-01-19 RX ORDER — HYDROXYZINE 25 MG/1
25 TABLET, FILM COATED ORAL
Status: DISCONTINUED | OUTPATIENT
Start: 2018-01-19 | End: 2018-01-19

## 2018-01-19 RX ORDER — LOPERAMIDE HYDROCHLORIDE 2 MG/1
2 CAPSULE ORAL AS NEEDED
Status: DISCONTINUED | OUTPATIENT
Start: 2018-01-19 | End: 2018-01-20 | Stop reason: HOSPADM

## 2018-01-19 RX ORDER — MORPHINE SULFATE 4 MG/ML
3 INJECTION, SOLUTION INTRAMUSCULAR; INTRAVENOUS
Status: DISCONTINUED | OUTPATIENT
Start: 2018-01-19 | End: 2018-01-20 | Stop reason: HOSPADM

## 2018-01-19 RX ORDER — BUSPIRONE HYDROCHLORIDE 5 MG/1
5 TABLET ORAL 3 TIMES DAILY
Status: DISCONTINUED | OUTPATIENT
Start: 2018-01-19 | End: 2018-01-20 | Stop reason: HOSPADM

## 2018-01-19 RX ORDER — ONDANSETRON 2 MG/ML
4 INJECTION INTRAMUSCULAR; INTRAVENOUS
Status: DISCONTINUED | OUTPATIENT
Start: 2018-01-19 | End: 2018-01-20 | Stop reason: HOSPADM

## 2018-01-19 RX ORDER — FOLIC ACID 1 MG/1
1 TABLET ORAL DAILY
Status: DISCONTINUED | OUTPATIENT
Start: 2018-01-19 | End: 2018-01-20 | Stop reason: HOSPADM

## 2018-01-19 RX ORDER — ASPIRIN 325 MG/1
100 TABLET, FILM COATED ORAL DAILY
Status: DISCONTINUED | OUTPATIENT
Start: 2018-01-19 | End: 2018-01-20 | Stop reason: HOSPADM

## 2018-01-19 RX ORDER — MORPHINE SULFATE 2 MG/ML
2 INJECTION, SOLUTION INTRAMUSCULAR; INTRAVENOUS
Status: DISCONTINUED | OUTPATIENT
Start: 2018-01-19 | End: 2018-01-19

## 2018-01-19 RX ADMIN — BUSPIRONE HYDROCHLORIDE 5 MG: 5 TABLET ORAL at 21:06

## 2018-01-19 RX ADMIN — PROMETHAZINE HYDROCHLORIDE 12.5 MG: 25 INJECTION, SOLUTION INTRAMUSCULAR; INTRAVENOUS at 17:41

## 2018-01-19 RX ADMIN — Medication 3 MG: at 19:02

## 2018-01-19 RX ADMIN — Medication 100 MG: at 15:01

## 2018-01-19 RX ADMIN — PROMETHAZINE HYDROCHLORIDE 12.5 MG: 25 INJECTION, SOLUTION INTRAMUSCULAR; INTRAVENOUS at 11:00

## 2018-01-19 RX ADMIN — Medication 3 MG: at 23:22

## 2018-01-19 RX ADMIN — MORPHINE SULFATE 2 MG: 2 INJECTION, SOLUTION INTRAMUSCULAR; INTRAVENOUS at 15:00

## 2018-01-19 RX ADMIN — WATER 2 G: 1 INJECTION INTRAMUSCULAR; INTRAVENOUS; SUBCUTANEOUS at 15:00

## 2018-01-19 RX ADMIN — PRENATAL WITH FERROUS FUM AND FOLIC ACID 1 TABLET: 3080; 920; 120; 400; 22; 1.84; 3; 20; 10; 1; 12; 200; 27; 25; 2 TABLET ORAL at 15:01

## 2018-01-19 RX ADMIN — MORPHINE SULFATE 1 MG: 2 INJECTION, SOLUTION INTRAMUSCULAR; INTRAVENOUS at 05:45

## 2018-01-19 RX ADMIN — BUSPIRONE HYDROCHLORIDE 5 MG: 5 TABLET ORAL at 17:41

## 2018-01-19 RX ADMIN — SODIUM CHLORIDE 125 ML/HR: 900 INJECTION, SOLUTION INTRAVENOUS at 05:45

## 2018-01-19 RX ADMIN — FOLIC ACID 1 MG: 1 TABLET ORAL at 15:01

## 2018-01-19 RX ADMIN — MORPHINE SULFATE 1 MG: 2 INJECTION, SOLUTION INTRAMUSCULAR; INTRAVENOUS at 01:58

## 2018-01-19 RX ADMIN — MORPHINE SULFATE 2 MG: 2 INJECTION, SOLUTION INTRAMUSCULAR; INTRAVENOUS at 11:07

## 2018-01-19 NOTE — ROUTINE PROCESS
Bedside shift change report given to Jaylen Yung RN (oncoming nurse) by Laura Segura RN (offgoing nurse). Report included the following information SBAR, Kardex, MAR, Recent Results and Alarm Parameters .

## 2018-01-19 NOTE — ROUTINE PROCESS
Pt flailing in bed from side to side, requests ice chips for comfort. Has personal heating pad in room. Pt has removed protective cover from heating pad. When asked to please replace pad she refuses, states it is the only thing helping her pain. Pt back red but blanchable. Nurse offered to alternate heat and ice for pain relief. Pt states ice packs give her the shivers and she cannot tolerate ice. Pt notified that she may have Morphine at 3:00, expresses understanding and requests nurse bring medication when it is due.

## 2018-01-19 NOTE — PROGRESS NOTES
Patient report given to Dwayne Miles RN, all questions answered. Dr Jackie Hale in room with her. Patient refused Phenergan PO at this time.

## 2018-01-19 NOTE — PROGRESS NOTES
Bedside, Verbal and Written shift change report given to Craito Renee RN (oncoming nurse) by Norma Simpson RN (offgoing nurse). Report included the following information SBAR, Kardex, ED Summary, Intake/Output, MAR, Accordion, Recent Results and Med Rec Status. Patient has heating pad and applied to the back from home on direct contact. She was educated that direct contact to the skin can cause burns but she stated that \"that is how I like to use it\". All questions answered. Boyfriend is at bedside.

## 2018-01-19 NOTE — PROGRESS NOTES
Hospitalist Progress Note    Patient: Marielena Davenport MRN: [id number]  CSN: [id number]    YOB: 1985  Age: 29 y.o. Sex: female    DOA: 1/18/2018 LOS:  LOS: 1 day          Chief Complaint:    Pyelonephritis    Assessment/Plan     1. Pyelonephritis  2. Mild Sepsis  3. Pregnancy  4. Dehydration  5. THAI    1. Continue IV Rocephin daily. Patient continues to complain of lower abdominal pain. Follow urine cultures. Tylenol prn for fever, although patient is afebrile. Patient states she is urinating and it appears \"normal color now\" after IVF. 2. Patient received IVF bolus and started on IVF rehydration upon admission. BP remains normal, Cr improved, lactate now under 2. WBC count greatly improved. Electrolytes are now stable. 3. Appreciate OB input, will continue to follow with them. 4. Continue IVF. Overall improved. 5. Cr back to normal this AM. Continue IVF and monitor. Patient voiced complaints of difficult interactions with RN staff this AM, would like change in nurse. Spoke to nurse manager in regards to this - will arrange for new RN to be with patient. Will add IV Phenergan prior to meals so patient may attempt to eat without N/V.     ADDENDUM: Patient has had severe anxiety with histrionic behaviors that have limited our medical treatment plan. We have given her appropriate medications associated with her diagnosis. At this time, given the patient's recent behaviors, discussed the case with Dr Lashell Cruz and we will call Dr Jodi Miranda for psychiatric evaluation. At this juncture, the medical treatment plan has been outlined with the patient. A lengthy discussion was held this morning and the patient was explained the logic regarding the treatment plan. She was told that she would likely require one further dose of IV abx, continued IVF and if her clinical course has improved, she would be a candidate for discharge tomorrow morning.  At the time of the interview and exam, the patient was agreeable to the above. The patient has threatened multiple times to leave AMA since the interview. Given this possibilty, there has been a script placed in the patient's chart for Keflex 500mg QID for 7 days to ensure completion of her antibiotic course. Given the patient's anxiety and histrionic behaviors, security has been notified. DVT Prophylaxis - SCDs  Dispo: Home, 24-48 hours. Patient Active Problem List   Diagnosis Code    Substance abuse F19.10    Seizure ([address] R56.9    Intractable nausea and vomiting R11.2    Acute pyelonephritis N10    Anxiety F41.9    Dehydration E86.0    Hypokalemia E87.6    Acute renal injury ([facility] [address] N17.9    Pyelonephritis affecting pregnancy in first trimester O23.01    5 weeks gestation of pregnancy Z3A.01       Subjective:    Patient states she had a rough morning with staff. Says her anxiety is through the roof. States that she feels she is being judged by RN staff. Review of systems:    Constitutional: denies fevers, chills, myalgias  Respiratory: denies SOB, cough  Cardiovascular: denies chest pain, palpitations  Gastrointestinal: denies nausea, vomiting, diarrhea.  Reports lower abd pain      Vital signs/Intake and Output:  Visit Vitals    /56    Pulse 85    Temp 97.6 °F (36.4 °C)    Resp 18    Ht 4' 11\" (1.499 m)    Wt 52.9 kg (116 lb 9.6 oz)    SpO2 98%    Breastfeeding No    BMI 23.55 kg/m2     Current Shift:     Last three shifts:       Exam:    General: Well developed, alert, mild distress, OX3  Head/Neck: NCAT, supple, No masses, No lymphadenopathy  CVS:Regular rate and rhythm, no M/R/G, S1/S2 heard, no thrill  Lungs:Clear to auscultation bilaterally, no wheezes, rhonchi, or rales  Abdomen: Soft, suprapubic tenderness, No distention, Normal Bowel sounds, No hepatomegaly  Extremities: No C/C/E, pulses palpable 2+  Skin:normal texture and turgor, no rashes, no lesions  Neuro:grossly normal , follows commands  Psych:appropriate Labs: Results:       Chemistry Recent Labs      01/19/18   0413  01/18/18   1315   GLU  84  107*   NA  142  133*   K  3.9  3.1*   CL  110*  91*   CO2  23  26   BUN  10  25*   CREA  0.60  1.67*   CA  8.1*  10.5*   AGAP  9  16   BUCR  17  15   AP   --   84   TP   --   9.6*   ALB   --   5.3*   GLOB   --   4.3*   AGRAT   --   1.2      CBC w/Diff Recent Labs      01/19/18   0413  01/18/18   1315   WBC  12.8  20.1*   RBC  4.18*  5.69*   HGB  11.9*  16.3*   HCT  35.0  47.2*   PLT  252  356   GRANS  48  72   LYMPH  42  15*   EOS  1  1      Cardiac Enzymes No results for input(s): CPK, CKND1, NAEEM in the last 72 hours. No lab exists for component: CKRMB, TROIP   Coagulation No results for input(s): PTP, INR, APTT in the last 72 hours. No lab exists for component: INREXT    Lipid Panel No results found for: CHOL, CHOLPOCT, CHOLX, CHLST, CHOLV, 846733, HDL, LDL, LDLC, DLDLP, 646791, VLDLC, VLDL, TGLX, TRIGL, TRIGP, TGLPOCT, CHHD, CHHDX   BNP No results for input(s): BNPP in the last 72 hours.    Liver Enzymes Recent Labs      01/18/18   1315   TP  9.6*   ALB  5.3*   AP  84   SGOT  36      Thyroid Studies No results found for: T4, T3U, TSH, TSHEXT     Procedures/imaging: see electronic medical records for all procedures/Xrays and details which were not copied into this note but were reviewed prior to creation of [address], PAAshleyC

## 2018-01-19 NOTE — PROGRESS NOTES
Pt admitted for NV x 3 days. Denies current drug use, however,UDS is positive for opiates. She just found out in ER she is pregnant. She has hx of 3 other pregnancies and 2 abortions. She has one living child. Met with pt at bedside and she has indicated she lives with her boyfriend, Karen Mcdonald ([phone]). Pt has given permission for staff to speak with him if she is not able to communicate. During interview pt requested information for Parenting Classes and Anger Management Classes. CM has contacted [employer]PS to inquire about and active with this agency. CM was advised to contact the hot line and they will notify pt's CM if she has an open case. [facility] has been contacted to ensure pt is screened. Provided pt with information for [facility]B follow up, [facility] pregnancy centers, [facility], and [employer] that will be provided upon discharge. Pt is aware. Pt has indicated her significant other's family will transport her home when medically stable. Pt has had multiple outburst during the day in which security and the Nursing Supervisor have been called. Pt now has a psych consult pending. Pt also has a follow up appointment with OB to follow up post discharge. CM to continue to follow and assist      1600:    Received a return call from [address]mitt (Stanley Goodrich) and he has indicated pt is know to them from a previous case. Pt's parents have custody of her daughter. Given this, the case that was open with CPS is now closed. CM notified [employer] pt is currently pregnant and tested positive for opiates. CM continuing to follow and remains available to assist.       Discharge Reassessment Plan:  Low Risk    RRAT Score:  1 - 12     Low Risk Care Transition Interventions:  1. Discharge transition plan:  CSB and physician follow up   2. Involved patient/caregiver in assessment, planning, education and implement of intervention.   3. CM daily patient care huddles/interdisciplinary rounds were completed. 4. PCP/Specialist appointment within 5 days made prior to discharge. Date/Time  5. Facilitated transportation and logistics for follow-up appointments. 6. Handoff to [facility] Nurse Navigator or PCP practice. Care Management Interventions  Mode of Transport at Discharge:  Other (see comment) (significant other/friend)  Transition of Care Consult (CM Consult): Discharge Planning, Other (CSB follow up )  Current Support Network: Lives with Spouse (significant other)  Confirm Follow Up Transport: Friends  Plan discussed with Pt/Family/Caregiver: Yes  Discharge Location  Discharge Placement: Home with family assistance (OB and CSB follow up)

## 2018-01-19 NOTE — PROGRESS NOTES
INITIAL NUTRITION ASSESSMENT     RECOMMENDATIONS/PLAN:   Add Ensure Clear TID  Add Prenatal Vitamin & Thiamine  Add Folic Acid  Monitor labs/lytes, PO intakes, skin integrity, wt, fluid status, BM  REASON FOR ASSESSMENT:     []  RN Referral:    [x] MST score >/=2  Malnutrition Screening Tool (MST):  Recently Lost Weight Without Trying: No     Eating Poorly Due to Decreased Appetite: Yes  MST Score: 1      NUTRITION ASSESSMENT:   Client History: 29 yrs old Female admitted with n/v x 3 day PTA. Pt found out she was pregnant in ED. Pt has hx ETOH abuse and was positive for opiates in ED. Pt was sleeping when dietitian went up to see pt. Unable to nutritional interview at this time. PMHx: anciety, asthma, drug use, hepatitis C, kidney infection    Cultural/Alevism Food Preferences: None Identified    FOOD/NUTRITION HISTORY  Diet History: Per MD pt had n/v x 3 days PTA. Food Allergies:  [x] NKFA    Pertinent PTA Medications: none on file      NUTRITION INTAKE   Diet Order:  Regular       Average PO Intake:       No data found. Pertinent Medications:  [x] Reviewed; Insulin:  [] SSI  [] Pre-meal   []  Basal   [] Drip  [x] None  Pt expected to meet estimated nutrient needs through next review:          [x]  Yes     [] No;  ANTHROPOMETRICS  Height: 4' 11\" (149.9 cm)       Weight: 52.9 kg (116 lb 9.6 oz)    BMI: 23.5 kg/m^2  -  normal weight (18.5%-24.9% BMI)        Weight change: pt was 105# on 9/24/16 pt is currently 116#. Noted pt is currently pregnant. Comparison to Reference Standards:  IBW: 98 lbs      %IBW: 118%      AdjBW: n/a    NUTRITION-FOCUSED PHYSICAL ASSESSMENT  Skin: No pressure injury noted.      GI: No BM noted    BIOCHEMICAL DATA & MEDICAL TESTS  Pertinent Labs:  [x] Reviewed; Ca-8.1    NUTRITION PRESCRIPTION  Calories: 1587 kcal/day based on 30kcal/kg  Protein: 42 g/day based on 0.8 g/kg  CHO: 198 g/day based on 50% of total energy  Fluid: 1587 ml/day based on 1 kcal/ml      NUTRITION DIAGNOSES:   1. Inadequate oral intake related to N/V x 3 days as evidence by MD note. NUTRITION INTERVENTIONS:   INTERVENTIONS:        GOALS:  1. Prenatal Vitamin & Thiamine & Folic Acid 1. Encourage PO intake >75% at most meals by next review 4 days   2. Commercial Beverage-Ensure Clear TID          LEARNING NEEDS (Diet, Supplementation, Food/Nutrient-Drug Interaction):   [x] None Identified  [] Inpatient education provided/documented    [] Identified and patient:  [] Declined     [] Was not appropriate/indicated  NUTRITION MONITORING /EVALUATION:   Follow PO intake  Monitor wt  Monitor renal labs, electrolytes, fluid status  Monitor for additional supplement needs    [] Participated in Interdisciplinary Rounds  [x] Interdisciplinary Care Plan Reviewed/Documented  DISCHARGE NUTRITION RECOMMENDATIONS ADDRESSED:     [x] Yes- recommended Regular diet    NUTRITION RISK:     [x]  At risk                     []  Not currently at risk     Will follow-up per policy.   Robson Meyer, RD  PAGER:  [phone]
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
## 2018-01-19 NOTE — PROGRESS NOTES
OB note    Still with L flank pain. No Bleeding.  Continue abx    Will repeat UA    MARYURI Krishnamurthy

## 2018-01-19 NOTE — PROGRESS NOTES
Asked by staff to visit with patient to see if I could help calm her. Patient was flailing around all over the bed. Would occasionally stop completely to answer a question and then started up again. She stated that \"it helped the pain in her back. \"  She admitted that she had had pain meds about 15 minutes before but that they \"only gave her a tiny bit\" because she is pregnant. She stated that she had good support from her boyfriend, parents (one lives close, dad in [facility]). We talked about calming techniques. I got a coloring book and pencils from the gift shop and talked about using them to help calm. I talked about using a mantra like \"be still and know that I am God. \"  She said that she counts backwards from 10 and then did it really fast. I showed her how to say one number - 10 and then breathe in and blow out slowly. She tried it and as I was leaving was calming. May not last. Spa team was coming in to give her a treatment. I encouraged her to do the breathing technique or to color quietly after the spa team finishes. [facility]   Board Certified   [phone] - Office

## 2018-01-20 VITALS
HEART RATE: 86 BPM | TEMPERATURE: 98.7 F | OXYGEN SATURATION: 100 % | RESPIRATION RATE: 18 BRPM | WEIGHT: 110.3 LBS | SYSTOLIC BLOOD PRESSURE: 105 MMHG | HEIGHT: 59 IN | DIASTOLIC BLOOD PRESSURE: 65 MMHG | BODY MASS INDEX: 22.24 KG/M2

## 2018-01-20 PROBLEM — F34.1 DYSTHYMIC DISORDER: Status: ACTIVE | Noted: 2018-01-20

## 2018-01-20 PROBLEM — F60.9 PERSONALITY DISORDER (HCC): Status: ACTIVE | Noted: 2018-01-20

## 2018-01-20 LAB
ANION GAP SERPL CALC-SCNC: 12 MMOL/L (ref 3–18)
BACTERIA SPEC CULT: NORMAL
BASOPHILS # BLD: 0 K/UL (ref 0–0.1)
BASOPHILS NFR BLD: 0 % (ref 0–3)
BUN SERPL-MCNC: 5 MG/DL (ref 7–18)
BUN/CREAT SERPL: 9 (ref 12–20)
CALCIUM SERPL-MCNC: 8.4 MG/DL (ref 8.5–10.1)
CHLORIDE SERPL-SCNC: 108 MMOL/L (ref 100–108)
CO2 SERPL-SCNC: 23 MMOL/L (ref 21–32)
CREAT SERPL-MCNC: 0.55 MG/DL (ref 0.6–1.3)
DIFFERENTIAL METHOD BLD: ABNORMAL
EOSINOPHIL # BLD: 0.3 K/UL (ref 0–0.4)
EOSINOPHIL NFR BLD: 2 % (ref 0–5)
ERYTHROCYTE [DISTWIDTH] IN BLOOD BY AUTOMATED COUNT: 14.9 % (ref 11.6–14.5)
GLUCOSE SERPL-MCNC: 100 MG/DL (ref 74–99)
HCG SERPL-ACNC: 199 MIU/ML (ref 1–6)
HCT VFR BLD AUTO: 36.7 % (ref 35–45)
HGB BLD-MCNC: 12.4 G/DL (ref 12–16)
LYMPHOCYTES # BLD: 4.3 K/UL (ref 0.8–3.5)
LYMPHOCYTES NFR BLD: 33 % (ref 20–51)
MCH RBC QN AUTO: 28.4 PG (ref 24–34)
MCHC RBC AUTO-ENTMCNC: 33.8 G/DL (ref 31–37)
MCV RBC AUTO: 84 FL (ref 74–97)
MONOCYTES # BLD: 1.5 K/UL (ref 0–1)
MONOCYTES NFR BLD: 12 % (ref 2–9)
NEUTS BAND NFR BLD MANUAL: 1 % (ref 0–5)
NEUTS SEG # BLD: 6.7 K/UL (ref 1.8–8)
NEUTS SEG NFR BLD: 52 % (ref 42–75)
PLATELET # BLD AUTO: 259 K/UL (ref 135–420)
PMV BLD AUTO: 11.5 FL (ref 9.2–11.8)
POTASSIUM SERPL-SCNC: 3.4 MMOL/L (ref 3.5–5.5)
RBC # BLD AUTO: 4.37 M/UL (ref 4.2–5.3)
RBC MORPH BLD: ABNORMAL
SERVICE CMNT-IMP: NORMAL
SODIUM SERPL-SCNC: 143 MMOL/L (ref 136–145)
WBC # BLD AUTO: 12.9 K/UL (ref 4.6–13.2)

## 2018-01-20 PROCEDURE — 74011250637 HC RX REV CODE- 250/637: Performed by: HOSPITALIST

## 2018-01-20 PROCEDURE — 36415 COLL VENOUS BLD VENIPUNCTURE: CPT | Performed by: PHYSICIAN ASSISTANT

## 2018-01-20 PROCEDURE — 74011250636 HC RX REV CODE- 250/636: Performed by: HOSPITALIST

## 2018-01-20 PROCEDURE — 74011250636 HC RX REV CODE- 250/636: Performed by: PHYSICIAN ASSISTANT

## 2018-01-20 PROCEDURE — 85025 COMPLETE CBC W/AUTO DIFF WBC: CPT | Performed by: PHYSICIAN ASSISTANT

## 2018-01-20 PROCEDURE — 90471 IMMUNIZATION ADMIN: CPT

## 2018-01-20 PROCEDURE — 84702 CHORIONIC GONADOTROPIN TEST: CPT | Performed by: OBSTETRICS & GYNECOLOGY

## 2018-01-20 PROCEDURE — 80048 BASIC METABOLIC PNL TOTAL CA: CPT | Performed by: PHYSICIAN ASSISTANT

## 2018-01-20 PROCEDURE — 90686 IIV4 VACC NO PRSV 0.5 ML IM: CPT | Performed by: HOSPITALIST

## 2018-01-20 RX ORDER — CEPHALEXIN 500 MG/1
500 CAPSULE ORAL 4 TIMES DAILY
Qty: 28 CAP | Refills: 0 | Status: SHIPPED | OUTPATIENT
Start: 2018-01-20 | End: 2018-03-11

## 2018-01-20 RX ADMIN — Medication 3 MG: at 12:19

## 2018-01-20 RX ADMIN — Medication 100 MG: at 08:06

## 2018-01-20 RX ADMIN — PRENATAL WITH FERROUS FUM AND FOLIC ACID 1 TABLET: 3080; 920; 120; 400; 22; 1.84; 3; 20; 10; 1; 12; 200; 27; 25; 2 TABLET ORAL at 08:06

## 2018-01-20 RX ADMIN — Medication 3 MG: at 08:06

## 2018-01-20 RX ADMIN — INFLUENZA VIRUS VACCINE 0.5 ML: 15; 15; 15; 15 SUSPENSION INTRAMUSCULAR at 12:44

## 2018-01-20 RX ADMIN — SODIUM CHLORIDE 125 ML/HR: 900 INJECTION, SOLUTION INTRAVENOUS at 01:04

## 2018-01-20 RX ADMIN — BUSPIRONE HYDROCHLORIDE 5 MG: 5 TABLET ORAL at 08:06

## 2018-01-20 RX ADMIN — Medication 3 MG: at 04:14

## 2018-01-20 RX ADMIN — FOLIC ACID 1 MG: 1 TABLET ORAL at 08:06

## 2018-01-20 NOTE — ROUTINE PROCESS
Bedside shift change report given to Tamar Mosqueda RN (oncoming nurse) by Monico Argueta RN (offgoing nurse). Report included the following information SBAR, Kardex, Procedure Summary, Intake/Output, MAR, Recent Results and Med Rec Status.
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
## 2018-01-20 NOTE — ROUTINE PROCESS
Bedside shift change report given to Ajay Berman (oncoming nurse) by Marcela Alfonso RN (offgoing nurse). Report included the following information SBAR, Kardex, MAR, Recent Results and Alarm Parameters .

## 2018-01-20 NOTE — PROGRESS NOTES
CTSP and check on how she is doing  Pt. Is being discharged home  I offered to make appointment with our office for patient for her follow-up visit however, patient and partner needed to figure out schedule for next week. Therefore, I gave patient our office name and number for her to call and make an appointment when most convenient for her for this upcoming Tuesday. Stable for discharge from an obstetric standpoint.

## 2018-01-20 NOTE — DISCHARGE INSTRUCTIONS
Kidney Infection: Care Instructions  Your Care Instructions    A kidney infection (pyelonephritis) is a type of urinary tract infection, or UTI. Most UTIs are bladder infections. Kidney infections tend to make people much sicker than bladder infections do. A kidney infection is also more serious because it can cause lasting damage if it is not treated quickly. Follow-up care is a key part of your treatment and safety. Be sure to make and go to all appointments, and call your doctor if you are having problems. It's also a good idea to know your test results and keep a list of the medicines you take. How can you care for yourself at home? · Take your antibiotics as directed. Do not stop taking them just because you feel better. You need to take the full course of antibiotics. · Drink plenty of water, enough so that your urine is light yellow or clear like water. This may help wash out bacteria that are causing the infection. If you have kidney, heart, or liver disease and have to limit fluids, talk with your doctor before you increase the amount of fluids you drink. · Urinate often. Try to empty your bladder each time. · To relieve pain, take a hot shower or lay a heating pad (set on low) over your lower belly. Never go to sleep with a heating pad in place. Put a thin cloth between the heating pad and your skin. To help prevent kidney infections  · Drink plenty of water each day. This helps you urinate often, which clears bacteria from your system. If you have kidney, heart, or liver disease and have to limit fluids, talk with your doctor before you increase the amount of fluids you drink. · Urinate when you have the urge. Do not hold your urine for a long time. Urinate before you go to sleep. · If you have symptoms of a bladder infection, such as burning when you urinate or having to urinate often, call your doctor so you can treat the problem before it gets worse.  If you do not treat a bladder infection quickly, it can spread to the kidney. · Men should keep the tip of the penis clean. If you are a woman, keep these ideas in mind:  · Urinate right after you have sex. · Change sanitary pads often. Avoid douches, feminine hygiene sprays, and other feminine hygiene products that have deodorants. · After going to the bathroom, wipe from front to back. When should you call for help? Call your doctor now or seek immediate medical care if:  ? · You have symptoms that a kidney infection is getting worse. These may include:  ¨ Pain or burning when you urinate. ¨ A frequent need to urinate without being able to pass much urine. ¨ Pain in the flank, which is just below the rib cage and above the waist on either side of the back. ¨ Blood in the urine. ¨ A fever. ? · You are vomiting or nauseated. ? Watch closely for changes in your health, and be sure to contact your doctor if:  ? · You do not get better as expected. Where can you learn more? Go to http://shruti-pilo.info/. Enter S583 in the search box to learn more about \"Kidney Infection: Care Instructions. \"  Current as of: May 12, 2017  Content Version: 11.4  © 5529-5482 [employer]. Care instructions adapted under license by [employer] (which disclaims liability or warranty for this information). If you have questions about a medical condition or this instruction, always ask your healthcare professional. [place] [address] any warranty or liability for your use of this information.   Patient armband removed and shredded    DISCHARGE SUMMARY from Nurse    PATIENT INSTRUCTIONS:    After general anesthesia or intravenous sedation, for 24 hours or while taking prescription Narcotics:  · Limit your activities  · Do not drive and operate hazardous machinery  · Do not make important personal or business decisions  · Do  not drink alcoholic beverages  · If you have not urinated within 8 hours after discharge, please contact your surgeon on call. Report the following to your surgeon:  · Excessive pain, swelling, redness or odor of or around the surgical area  · Temperature over 100.5  · Nausea and vomiting lasting longer than 4 hours or if unable to take medications  · Any signs of decreased circulation or nerve impairment to extremity: change in color, persistent  numbness, tingling, coldness or increase pain  · Any questions    What to do at Home:  Recommended activity: Activity as tolerated        *  Please give a list of your current medications to your Primary Care Provider. *  Please update this list whenever your medications are discontinued, doses are      changed, or new medications (including over-the-counter products) are added. *  Please carry medication information at all times in case of emergency situations. These are general instructions for a healthy lifestyle:    No smoking/ No tobacco products/ Avoid exposure to second hand smoke  Surgeon General's Warning:  Quitting smoking now greatly reduces serious risk to your health. Obesity, smoking, and sedentary lifestyle greatly increases your risk for illness    A healthy diet, regular physical exercise & weight monitoring are important for maintaining a healthy lifestyle    You may be retaining fluid if you have a history of heart failure or if you experience any of the following symptoms:  Weight gain of 3 pounds or more overnight or 5 pounds in a week, increased swelling in our hands or feet or shortness of breath while lying flat in bed. Please call your doctor as soon as you notice any of these symptoms; do not wait until your next office visit.     Recognize signs and symptoms of STROKE:    F-face looks uneven    A-arms unable to move or move unevenly    S-speech slurred or non-existent    T-time-call 911 as soon as signs and symptoms begin-DO NOT go       Back to bed or wait to see if you get better-TIME IS BRAIN. Warning Signs of HEART ATTACK     Call 911 if you have these symptoms:   Chest discomfort. Most heart attacks involve discomfort in the center of the chest that lasts more than a few minutes, or that goes away and comes back. It can feel like uncomfortable pressure, squeezing, fullness, or pain.  Discomfort in other areas of the upper body. Symptoms can include pain or discomfort in one or both arms, the back, neck, jaw, or stomach.  Shortness of breath with or without chest discomfort.  Other signs may include breaking out in a cold sweat, nausea, or lightheadedness. Don't wait more than five minutes to call 911 - MINUTES MATTER! Fast action can save your life. Calling 911 is almost always the fastest way to get lifesaving treatment. Emergency Medical Services staff can begin treatment when they arrive -- up to an hour sooner than if someone gets to the hospital by car. The discharge information has been reviewed with the patient. The patient verbalized understanding. Discharge medications reviewed with the patient and appropriate educational materials and side effects teaching were provided.   ___________________________________________________________________________________________________________________________________

## 2018-01-20 NOTE — PROGRESS NOTES
Shift Summary- Code purple called for pt going off the floor. Pt medicated for pain through the night.       Patient Vitals for the past 12 hrs:   Temp Pulse Resp BP SpO2   01/20/18 0420 98.1 °F (36.7 °C) 79 18 124/69 -   01/19/18 2302 99 °F (37.2 °C) 75 16 121/72 100 %

## 2018-01-20 NOTE — ROUTINE PROCESS
Dual AVS reviewed with Matt Herman RN. All medications reviewed individually with patient. Opportunities for questions and concerns provided. Patient discharged via (mode of transport ie. Car, ambulance or air transport) ambulated and refused assistance from transportation or staff. Patient's arm band appropriately discarded.

## 2018-01-20 NOTE — ROUTINE PROCESS
Shift summary: Pt alert, uncooperative, often emotional and agitated. Security called x2 due to pt screaming and beating on siderails. Pt threatened to leave AMA this shift but was able to be redirected and states she will stay because she would like to get help. Phenergan given x2, Morphine given q4hr. Pt continues with home heating pad, refuses to put protective cover on.

## 2018-01-20 NOTE — DISCHARGE SUMMARY
Discharge Summary    Patient: Arthur Hagen MRN: [id number]  CSN: [id number]    YOB: 1985  Age: 29 y.o. Sex: female    DOA: 1/18/2018 LOS:  LOS: 2 days   Discharge Date:      Primary Care Provider:  None    Admission Diagnoses: Pyelonephritis affecting pregnancy in first trimester  Hypokalemia  Acute renal injury [facility])  Dehydration    Discharge Diagnoses:    Hospital Problems  Date Reviewed: 4/22/2015          Codes Class Noted POA    Dehydration ICD-10-CM: E86.0  ICD-9-CM: 276.51  1/18/2018 Unknown        Hypokalemia ICD-10-CM: E87.6  ICD-9-CM: 276.8  1/18/2018 Unknown        Acute renal injury ([facility] [address] ICD-10-CM: N17.9  ICD-9-CM: 584.9  1/18/2018 Unknown        Pyelonephritis affecting pregnancy in first trimester ICD-10-CM: O23.01  ICD-9-CM: 646.63, 590.80  1/18/2018 Unknown        5 weeks gestation of pregnancy ICD-10-CM: Z3A.01  ICD-9-CM: V22.2  1/18/2018 Yes            Dysthymic disorder. Personality disorder, unspecified (borderline and antisocial personality disorder). Discharge Condition: Stable    Discharge Medications:     Current Discharge Medication List      START taking these medications    Details   cephALEXin (KEFLEX) 500 mg capsule Take 1 Cap by mouth four (4) times daily. Qty: 28 Cap, Refills: 0      prenatal vit-calcium-iron-fa (PRENATAL PLUS WITH CALCIUM) 27 mg iron- 1 mg tab Take 1 Tab by mouth daily. Qty: 30 Tab, Refills: 0             Procedures : none     Consults: OB and psychiatric       PHYSICAL EXAM   Visit Vitals    /83    Pulse 63    Temp 98.7 °F (37.1 °C)    Resp 17    Ht 4' 11\" (1.499 m)    Wt 50 kg (110 lb 4.8 oz)    SpO2 100%    Breastfeeding No    BMI 22.28 kg/m2     General: Awake, cooperative, no acute distress    HEENT: NC, Atraumatic. PERRLA, EOMI. Anicteric sclerae. Lungs:  CTA Bilaterally. No Wheezing/Rhonchi/Rales. Heart:  Regular  rhythm,  No murmur, No Rubs, No Gallops  Abdomen: Soft, Non distended, Non tender.  +Bowel sounds, Extremities: No c/c/e  Psych:   Not anxious or agitated. Neurologic:  No acute neurological deficits. Admission HPI :   Trisha Pinto is a 29 y.o. female who c/o N/V X 3 days. Hard to keep liquids down. No abd pain but has had low back pain. Thought she had another kidney stone like she had a few years ago. Denies current drug use but UDS is positive for opiates. She does drink ETOh, and she just found out in ER she is pregnant. Last menses was end of November. She has hx of 3 other pregnancies and 2 abortions. She has one living child. She has a hx of heroin use. She feels better after pain medicine and now wants water and ice. She states she stopped her anxiety meds a month ago as her medicaid ran out. She is here with a biyfriend she lives with. She adamantly denies current drug use/IVDA. She has Hep C which is untreated. Hospital Course :   Since she was admitted, she received iv hydration and rocephin for uti /possible peylo. Urine cx: 10,000 COLONIES/mL   MIXED GRAM POSITIVE JACOB, PROBABLE SKIN/GENITAL CONTAMINATION. bcx no growth. Her leukocytosis resolved. She remained hemodynamic stable   Ob was on board for her pregnancy and recommend to f/u as out-pt. Psych on board for her psychiatric issue and her behavior during her stay. Medication was hold due to pregnancy. Patient reported that she feels safe to go home with her boy friends and his boy friends at the bedside on discharge.  douglas resolved, K was replaced on discharge     Activity: Activity as tolerated    Diet: Regular Diet    Follow-up: pcm and ob     Disposition: home     Minutes spent on discharge: 45 min       Labs: Results:       Chemistry Recent Labs      01/20/18   0244  01/19/18   0413  01/18/18   1315   GLU  100*  84  107*   NA  143  142  133*   K  3.4*  3.9  3.1*   CL  108  110*  91*   CO2  23  23  26   BUN  5*  10  25*   CREA  0.55*  0.60  1.67*   CA  8.4*  8.1*  10.5*   AGAP  12  9  16   BUCR  9*  17 15   AP   --    --   84   TP   --    --   9.6*   ALB   --    --   5.3*   GLOB   --    --   4.3*   AGRAT   --    --   1.2      CBC w/Diff Recent Labs      01/20/18   0244  01/19/18   0413  01/18/18   1315   WBC  12.9  12.8  20.1*   RBC  4.37  4.18*  5.69*   HGB  12.4  11.9*  16.3*   HCT  36.7  35.0  47.2*   PLT  259  252  356   GRANS  52  48  72   LYMPH  33  42  15*   EOS  2  1  1      Cardiac Enzymes No results for input(s): CPK, CKND1, NAEEM in the last 72 hours. No lab exists for component: CKRMB, TROIP   Coagulation No results for input(s): PTP, INR, APTT in the last 72 hours. No lab exists for component: INREXT    Lipid Panel No results found for: CHOL, CHOLPOCT, CHOLX, CHLST, CHOLV, 682392, HDL, LDL, LDLC, DLDLP, 859791, VLDLC, VLDL, TGLX, TRIGL, TRIGP, TGLPOCT, CHHD, CHHDX   BNP No results for input(s): BNPP in the last 72 hours. Liver Enzymes Recent Labs      01/18/18   1315   TP  9.6*   ALB  5.3*   AP  84   SGOT  36      Thyroid Studies No results found for: T4, T3U, TSH, TSHEXT         Significant Diagnostic Studies: Us Retroperitoneum Comp    Result Date: 1/18/2018  Ultrasound retroperitoneum INDICATION: Urinary tract infection, right flank pain. COMPARISON: None. FINDINGS: Right kidney measures approximately 9.5 cm sagittal and left kidney 9.4 cm. Normal cortical echotexture bilaterally. No discrete solid or cystic lesions, hydronephrosis, urolithiasis, or perinephric collections. Survey of the urinary bladder was performed. Bladder is not well distended limiting evaluation. Prevoid bladder volume is approximately 37 mL. Ureteric jets were not seen but no hydronephrosis. Patient was unable to void. IMPRESSION: Ureteric jets are not visualized but no hydronephrosis or sonographic evidence for urolithiasis.     Us Uts Transvaginal Ob    Result Date: 1/18/2018  EXAM: Ultrasound of the Pelvis  Indication: Left-sided pelvic pain, pregnant Technique: Multiple real time sonographic images were obtained through the pelvis by transvaginal technique. Color and spectral Doppler technique were also utilized. Comparison studies: None. FINDINGS: The patients reported last menstrual period: 11/30/2017 The patients reported beta HCG level: 179 -NOTE: I was informed by the sonographer that this exam was terminated as the patient became violent. - Uterus, Endometrium:  The uterus measures 6.5 x 3.0 x 4.5 cm. The endometrial complex measures 10 mm. No sonographically identified gestational sac, saclike structure or IUP. Right adnexa: The right ovary measures 1.9 x 4.0 x 2.4 centimeters, containing a complex 1.6 cm structure. Doppler interrogation demonstrates normal vascular flow. No dominant cystic or solid parenchymal mass deforming lesion. Left adnexa: Limited visualization of the left ovary and left adnexa prior to termination of this exam demonstrates an ovoid structure measuring 2.7 cm resembling ovarian parenchyma. Free Fluid: None identified    IMPRESSION: 1. This examination was terminated, secondary to patient compliance. 2. Pregnancy of unknown location. No sonographically identified IUP, gestational sac or saclike structure. This is not an unexpected finding given the provided beta-hCG value of 179. Requires a CT and ultrasound follow-up and a definitive diagnosis. 3. Right ovarian complex lesion, potentially corpus luteum.              [facility]     CC: None

## 2018-01-21 LAB
ATRIAL RATE: 81 BPM
CALCULATED P AXIS, ECG09: 67 DEGREES
CALCULATED R AXIS, ECG10: 63 DEGREES
CALCULATED T AXIS, ECG11: 31 DEGREES
DIAGNOSIS, 93000: NORMAL
P-R INTERVAL, ECG05: 134 MS
Q-T INTERVAL, ECG07: 390 MS
QRS DURATION, ECG06: 84 MS
QTC CALCULATION (BEZET), ECG08: 453 MS
VENTRICULAR RATE, ECG03: 81 BPM

## 2018-01-24 LAB
BACTERIA SPEC CULT: NORMAL
SERVICE CMNT-IMP: NORMAL

## 2018-02-04 ENCOUNTER — HOSPITAL ENCOUNTER (INPATIENT)
Age: 33
LOS: 2 days | Discharge: HOME | DRG: 781 | End: 2018-02-06
Attending: INTERNAL MEDICINE | Admitting: OBSTETRICS & GYNECOLOGY
Payer: SELF-PAY

## 2018-02-04 ENCOUNTER — APPOINTMENT (OUTPATIENT)
Dept: ULTRASOUND IMAGING | Age: 33
DRG: 781 | End: 2018-02-04
Attending: PHYSICIAN ASSISTANT
Payer: SELF-PAY

## 2018-02-04 DIAGNOSIS — Z3A.01 6 WEEKS GESTATION OF PREGNANCY: ICD-10-CM

## 2018-02-04 DIAGNOSIS — R11.2 INTRACTABLE VOMITING WITH NAUSEA, UNSPECIFIED VOMITING TYPE: ICD-10-CM

## 2018-02-04 DIAGNOSIS — R10.9 LEFT FLANK PAIN: ICD-10-CM

## 2018-02-04 DIAGNOSIS — D72.829 LEUKOCYTOSIS, UNSPECIFIED TYPE: Primary | ICD-10-CM

## 2018-02-04 DIAGNOSIS — F41.1 ANXIETY STATE: ICD-10-CM

## 2018-02-04 DIAGNOSIS — F19.10 POLYSUBSTANCE ABUSE (HCC): ICD-10-CM

## 2018-02-04 LAB
ALBUMIN SERPL-MCNC: 4.2 G/DL (ref 3.4–5)
ALBUMIN/GLOB SERPL: 1.1 {RATIO} (ref 0.8–1.7)
ALP SERPL-CCNC: 64 U/L (ref 45–117)
ALT SERPL-CCNC: 47 U/L (ref 13–56)
AMPHET UR QL SCN: NEGATIVE
ANION GAP SERPL CALC-SCNC: 14 MMOL/L (ref 3–18)
APPEARANCE UR: ABNORMAL
AST SERPL-CCNC: 26 U/L (ref 15–37)
BACTERIA URNS QL MICRO: ABNORMAL /HPF
BARBITURATES UR QL SCN: NEGATIVE
BASOPHILS # BLD: 0.1 K/UL (ref 0–0.06)
BASOPHILS NFR BLD: 0 % (ref 0–2)
BENZODIAZ UR QL: NEGATIVE
BILIRUB SERPL-MCNC: 0.4 MG/DL (ref 0.2–1)
BILIRUB UR QL: NEGATIVE
BUN SERPL-MCNC: 12 MG/DL (ref 7–18)
BUN/CREAT SERPL: 15 (ref 12–20)
CALCIUM SERPL-MCNC: 9.3 MG/DL (ref 8.5–10.1)
CANNABINOIDS UR QL SCN: NEGATIVE
CHLORIDE SERPL-SCNC: 103 MMOL/L (ref 100–108)
CO2 SERPL-SCNC: 24 MMOL/L (ref 21–32)
COCAINE UR QL SCN: POSITIVE
COLOR UR: ABNORMAL
CREAT SERPL-MCNC: 0.78 MG/DL (ref 0.6–1.3)
DIFFERENTIAL METHOD BLD: ABNORMAL
EOSINOPHIL # BLD: 0.2 K/UL (ref 0–0.4)
EOSINOPHIL NFR BLD: 1 % (ref 0–5)
EPITH CASTS URNS QL MICRO: ABNORMAL /LPF (ref 0–5)
ERYTHROCYTE [DISTWIDTH] IN BLOOD BY AUTOMATED COUNT: 15.3 % (ref 11.6–14.5)
GLOBULIN SER CALC-MCNC: 3.7 G/DL (ref 2–4)
GLUCOSE SERPL-MCNC: 121 MG/DL (ref 74–99)
GLUCOSE UR STRIP.AUTO-MCNC: NEGATIVE MG/DL
HCG SERPL-ACNC: ABNORMAL MIU/ML (ref 1–6)
HCT VFR BLD AUTO: 40.6 % (ref 35–45)
HDSCOM,HDSCOM: ABNORMAL
HGB BLD-MCNC: 13.7 G/DL (ref 12–16)
HGB UR QL STRIP: NEGATIVE
KETONES UR QL STRIP.AUTO: 40 MG/DL
LEUKOCYTE ESTERASE UR QL STRIP.AUTO: ABNORMAL
LYMPHOCYTES # BLD: 4 K/UL (ref 0.9–3.6)
LYMPHOCYTES NFR BLD: 20 % (ref 21–52)
MCH RBC QN AUTO: 28.4 PG (ref 24–34)
MCHC RBC AUTO-ENTMCNC: 33.7 G/DL (ref 31–37)
MCV RBC AUTO: 84.2 FL (ref 74–97)
METHADONE UR QL: NEGATIVE
MONOCYTES # BLD: 1.2 K/UL (ref 0.05–1.2)
MONOCYTES NFR BLD: 6 % (ref 3–10)
MUCOUS THREADS URNS QL MICRO: ABNORMAL /LPF
NEUTS SEG # BLD: 14.7 K/UL (ref 1.8–8)
NEUTS SEG NFR BLD: 73 % (ref 40–73)
NITRITE UR QL STRIP.AUTO: NEGATIVE
OPIATES UR QL: POSITIVE
PCP UR QL: NEGATIVE
PH UR STRIP: 7 [PH] (ref 5–8)
PLATELET # BLD AUTO: 383 K/UL (ref 135–420)
PMV BLD AUTO: 10.8 FL (ref 9.2–11.8)
POTASSIUM SERPL-SCNC: 4.2 MMOL/L (ref 3.5–5.5)
PROT SERPL-MCNC: 7.9 G/DL (ref 6.4–8.2)
PROT UR STRIP-MCNC: 30 MG/DL
RBC # BLD AUTO: 4.82 M/UL (ref 4.2–5.3)
RBC #/AREA URNS HPF: ABNORMAL /HPF (ref 0–5)
SODIUM SERPL-SCNC: 141 MMOL/L (ref 136–145)
SP GR UR REFRACTOMETRY: >1.03 (ref 1–1.03)
UROBILINOGEN UR QL STRIP.AUTO: 1 EU/DL (ref 0.2–1)
WBC # BLD AUTO: 20.1 K/UL (ref 4.6–13.2)
WBC URNS QL MICRO: ABNORMAL /HPF (ref 0–5)

## 2018-02-04 PROCEDURE — 96375 TX/PRO/DX INJ NEW DRUG ADDON: CPT

## 2018-02-04 PROCEDURE — 80053 COMPREHEN METABOLIC PANEL: CPT | Performed by: PHYSICIAN ASSISTANT

## 2018-02-04 PROCEDURE — 74011258636 HC RX REV CODE- 258/636: Performed by: OBSTETRICS & GYNECOLOGY

## 2018-02-04 PROCEDURE — 87040 BLOOD CULTURE FOR BACTERIA: CPT | Performed by: PHYSICIAN ASSISTANT

## 2018-02-04 PROCEDURE — 74011250637 HC RX REV CODE- 250/637: Performed by: OBSTETRICS & GYNECOLOGY

## 2018-02-04 PROCEDURE — 65270000029 HC RM PRIVATE

## 2018-02-04 PROCEDURE — 96361 HYDRATE IV INFUSION ADD-ON: CPT

## 2018-02-04 PROCEDURE — 76817 TRANSVAGINAL US OBSTETRIC: CPT

## 2018-02-04 PROCEDURE — 74011000250 HC RX REV CODE- 250: Performed by: OBSTETRICS & GYNECOLOGY

## 2018-02-04 PROCEDURE — 74011250636 HC RX REV CODE- 250/636: Performed by: OBSTETRICS & GYNECOLOGY

## 2018-02-04 PROCEDURE — 96374 THER/PROPH/DIAG INJ IV PUSH: CPT

## 2018-02-04 PROCEDURE — 80307 DRUG TEST PRSMV CHEM ANLYZR: CPT | Performed by: PHYSICIAN ASSISTANT

## 2018-02-04 PROCEDURE — 74011250636 HC RX REV CODE- 250/636: Performed by: PHYSICIAN ASSISTANT

## 2018-02-04 PROCEDURE — 81001 URINALYSIS AUTO W/SCOPE: CPT | Performed by: PHYSICIAN ASSISTANT

## 2018-02-04 PROCEDURE — 84702 CHORIONIC GONADOTROPIN TEST: CPT | Performed by: PHYSICIAN ASSISTANT

## 2018-02-04 PROCEDURE — 96372 THER/PROPH/DIAG INJ SC/IM: CPT

## 2018-02-04 PROCEDURE — 74011250637 HC RX REV CODE- 250/637: Performed by: PHYSICIAN ASSISTANT

## 2018-02-04 PROCEDURE — 74011000250 HC RX REV CODE- 250: Performed by: PHYSICIAN ASSISTANT

## 2018-02-04 PROCEDURE — 85025 COMPLETE CBC W/AUTO DIFF WBC: CPT | Performed by: PHYSICIAN ASSISTANT

## 2018-02-04 PROCEDURE — 99285 EMERGENCY DEPT VISIT HI MDM: CPT

## 2018-02-04 RX ORDER — ONDANSETRON 2 MG/ML
4 INJECTION INTRAMUSCULAR; INTRAVENOUS
Status: COMPLETED | OUTPATIENT
Start: 2018-02-04 | End: 2018-02-04

## 2018-02-04 RX ORDER — ONDANSETRON 2 MG/ML
4 INJECTION INTRAMUSCULAR; INTRAVENOUS
Status: DISCONTINUED | OUTPATIENT
Start: 2018-02-04 | End: 2018-02-06 | Stop reason: HOSPADM

## 2018-02-04 RX ORDER — OXYCODONE AND ACETAMINOPHEN 5; 325 MG/1; MG/1
1-2 TABLET ORAL
Status: DISCONTINUED | OUTPATIENT
Start: 2018-02-04 | End: 2018-02-05

## 2018-02-04 RX ORDER — ACETAMINOPHEN 325 MG/1
650 TABLET ORAL
Status: DISCONTINUED | OUTPATIENT
Start: 2018-02-04 | End: 2018-02-06 | Stop reason: HOSPADM

## 2018-02-04 RX ORDER — ACETAMINOPHEN 325 MG/1
650 TABLET ORAL
Status: COMPLETED | OUTPATIENT
Start: 2018-02-04 | End: 2018-02-04

## 2018-02-04 RX ORDER — MORPHINE SULFATE 4 MG/ML
4 INJECTION INTRAVENOUS
Status: COMPLETED | OUTPATIENT
Start: 2018-02-04 | End: 2018-02-04

## 2018-02-04 RX ORDER — PROMETHAZINE HYDROCHLORIDE 25 MG/ML
25 INJECTION, SOLUTION INTRAMUSCULAR; INTRAVENOUS
Status: COMPLETED | OUTPATIENT
Start: 2018-02-04 | End: 2018-02-04

## 2018-02-04 RX ORDER — DIPHENHYDRAMINE HYDROCHLORIDE 50 MG/ML
25 INJECTION, SOLUTION INTRAMUSCULAR; INTRAVENOUS ONCE
Status: COMPLETED | OUTPATIENT
Start: 2018-02-04 | End: 2018-02-04

## 2018-02-04 RX ORDER — ZOLPIDEM TARTRATE 5 MG/1
5 TABLET ORAL
Status: DISCONTINUED | OUTPATIENT
Start: 2018-02-04 | End: 2018-02-06 | Stop reason: HOSPADM

## 2018-02-04 RX ORDER — DEXTROSE, SODIUM CHLORIDE, SODIUM LACTATE, POTASSIUM CHLORIDE, AND CALCIUM CHLORIDE 5; .6; .31; .03; .02 G/100ML; G/100ML; G/100ML; G/100ML; G/100ML
150 INJECTION, SOLUTION INTRAVENOUS CONTINUOUS
Status: DISCONTINUED | OUTPATIENT
Start: 2018-02-04 | End: 2018-02-06

## 2018-02-04 RX ADMIN — ONDANSETRON 4 MG: 2 INJECTION INTRAMUSCULAR; INTRAVENOUS at 14:24

## 2018-02-04 RX ADMIN — SODIUM CHLORIDE 1000 ML: 900 INJECTION, SOLUTION INTRAVENOUS at 12:58

## 2018-02-04 RX ADMIN — ACETAMINOPHEN 650 MG: 325 TABLET ORAL at 17:43

## 2018-02-04 RX ADMIN — SODIUM CHLORIDE 25 MG: 9 INJECTION INTRAMUSCULAR; INTRAVENOUS; SUBCUTANEOUS at 22:00

## 2018-02-04 RX ADMIN — SODIUM CHLORIDE 1000 ML: 900 INJECTION, SOLUTION INTRAVENOUS at 17:44

## 2018-02-04 RX ADMIN — PROMETHAZINE HYDROCHLORIDE 25 MG: 25 INJECTION INTRAMUSCULAR; INTRAVENOUS at 12:58

## 2018-02-04 RX ADMIN — MORPHINE SULFATE 4 MG: 4 INJECTION INTRAVENOUS at 12:58

## 2018-02-04 RX ADMIN — ONDANSETRON 4 MG: 2 INJECTION INTRAMUSCULAR; INTRAVENOUS at 17:43

## 2018-02-04 RX ADMIN — DIPHENHYDRAMINE HYDROCHLORIDE 25 MG: 50 INJECTION, SOLUTION INTRAMUSCULAR; INTRAVENOUS at 14:57

## 2018-02-04 RX ADMIN — CEFAZOLIN SODIUM 1 G: 1 INJECTION, POWDER, FOR SOLUTION INTRAMUSCULAR; INTRAVENOUS at 17:43

## 2018-02-04 RX ADMIN — ONDANSETRON 4 MG: 2 INJECTION INTRAMUSCULAR; INTRAVENOUS at 20:43

## 2018-02-04 RX ADMIN — SODIUM CHLORIDE, SODIUM LACTATE, POTASSIUM CHLORIDE, CALCIUM CHLORIDE, AND DEXTROSE MONOHYDRATE 125 ML/HR: 600; 310; 30; 20; 5 INJECTION, SOLUTION INTRAVENOUS at 19:05

## 2018-02-04 RX ADMIN — OXYCODONE HYDROCHLORIDE AND ACETAMINOPHEN 2 TABLET: 5; 325 TABLET ORAL at 23:06

## 2018-02-04 RX ADMIN — ZOLPIDEM TARTRATE 5 MG: 5 TABLET ORAL at 20:40

## 2018-02-04 RX ADMIN — ACETAMINOPHEN 650 MG: 325 TABLET, FILM COATED ORAL at 22:07

## 2018-02-04 NOTE — ED NOTES
Patient medicated per order. See MAR. Patient still rolling around in bed, anxious & hyperventilating.

## 2018-02-04 NOTE — ED PROVIDER NOTES
EMERGENCY DEPARTMENT HISTORY AND PHYSICAL EXAM    Date: 2/4/2018  Patient Name: Haley Bearden    History of Presenting Illness     Chief Complaint   Patient presents with    Anxiety    Flank Pain         History Provided By: Patient    Chief Complaint: flank pain  Duration: 1 Weeks  Timing:  Worsening  Location: Left flank  Quality: Sharp  Severity: Moderate  Modifying Factors: none  Associated Symptoms: dysuria, anxiety, N/V, back pain, and lower abdominal pain    Additional History (Context):   12:34 PM  Haley Bearden is a 29 y.o. female arriving via EMS with PMHX of heroin abuse, Hepatitis C, \"kidney infection\", and anxiety who presents to the emergency department C/O sharp, worsening left flank pain onset 1 week ago. Associated sxs include dysuria, anxiety, N/V, back pain, and lower abdominal pain. Pt is not on any anxiety medication, but is taking Keflex for her kidney infection and was given Morphine and Phenergan when she was admitted last week. Pt is 6 weeks pregnant, but has not had IUP confirmed, unsure what US showed during recent admission. Pt is supposed to take Buspar and Lexapro for her anxiety but hasn't recently because of her pregnancy. Pt denies any other substance abuse despite history, denies possible withdrawal sx. Pt denies hx of kidney stones, CP, SOB and any other sxs or complaints. PCP: None    Current Facility-Administered Medications   Medication Dose Route Frequency Provider Last Rate Last Dose    sodium chloride 0.9 % bolus infusion 1,000 mL  1,000 mL IntraVENous ONCE AILYN Hays 1,000 mL/hr at 02/04/18 1744 1,000 mL at 02/04/18 1744     Current Outpatient Prescriptions   Medication Sig Dispense Refill    cephALEXin (KEFLEX) 500 mg capsule Take 1 Cap by mouth four (4) times daily. 28 Cap 0    escitalopram oxalate (LEXAPRO) 10 mg tablet Take 10 mg by mouth daily.          Past History     Past Medical History:  Past Medical History:   Diagnosis Date    Anxiety     Asthma  Depression     Drug use     Endometriosis     Hepatitis C     Heroin abuse     Kidney infection        Past Surgical History:  Past Surgical History:   Procedure Laterality Date    HX DILATION AND CURETTAGE      HX LEEP PROCEDURE         Family History:  History reviewed. No pertinent family history. Social History:  Social History   Substance Use Topics    Smoking status: Current Every Day Smoker     Packs/day: 0.50     Years: 1.00    Smokeless tobacco: Never Used    Alcohol use Yes      Comment: Occasionally        Allergies:  No Known Allergies      Review of Systems   Review of Systems   Gastrointestinal: Positive for abdominal pain, nausea and vomiting. Genitourinary: Positive for dysuria and flank pain (left). Musculoskeletal: Positive for back pain. Psychiatric/Behavioral: The patient is nervous/anxious. Physical Exam     Vitals:    02/04/18 1232 02/04/18 1459 02/04/18 1500 02/04/18 1752   BP:  134/80 136/78 137/85   Pulse:    73   Resp:  20     Temp: 96.9 °F (36.1 °C) 97.6 °F (36.4 °C)  99.4 °F (37.4 °C)   SpO2:  100% 99% 99%   Weight:       Height:         Physical Exam  Vital signs and nursing notes reviewed. CONSTITUTIONAL: Alert. Anxious, crying out, acting inappropriately, bouts of crying and thrashing about on stretcher; will calm down with conversation periodically. HEAD: Normocephalic; atraumatic. EYES: PERRL; EOM's intact. No nystagmus. Conjunctiva clear. ENT:  Moist mucus membranes. CV: Normal S1, S2; no murmurs, rubs, or gallops. No chest wall tenderness. RESPIRATORY: Normal chest excursion with respiration; breath sounds clear and equal bilaterally; no wheezes, rhonchi, or rales. GI: Non-distended; +suprapubic and LLQ, not consistent throughout exam; no guarding or rigidity; no palpable organomegaly. No CVA tenderness. BACK:  No evidence of trauma or deformity. Generalized lumbar tenderness. FROM without difficulty.  Negative straight leg raise bilaterally. EXT: Normal ROM in all four extremities; non-tender to palpation. SKIN: Normal for age and race; warm; dry; good turgor; no apparent lesions or exudate. NEURO: A & O x3. PSYCH: Anxious, crying out, acting inappropriately, bouts of crying and thrashing about on stretcher. Diagnostic Study Results     Labs -     Recent Results (from the past 12 hour(s))   CBC WITH AUTOMATED DIFF    Collection Time: 02/04/18 12:32 PM   Result Value Ref Range    WBC 20.1 (H) 4.6 - 13.2 K/uL    RBC 4.82 4.20 - 5.30 M/uL    HGB 13.7 12.0 - 16.0 g/dL    HCT 40.6 35.0 - 45.0 %    MCV 84.2 74.0 - 97.0 FL    MCH 28.4 24.0 - 34.0 PG    MCHC 33.7 31.0 - 37.0 g/dL    RDW 15.3 (H) 11.6 - 14.5 %    PLATELET 910 067 - 885 K/uL    MPV 10.8 9.2 - 11.8 FL    NEUTROPHILS 73 40 - 73 %    LYMPHOCYTES 20 (L) 21 - 52 %    MONOCYTES 6 3 - 10 %    EOSINOPHILS 1 0 - 5 %    BASOPHILS 0 0 - 2 %    ABS. NEUTROPHILS 14.7 (H) 1.8 - 8.0 K/UL    ABS. LYMPHOCYTES 4.0 (H) 0.9 - 3.6 K/UL    ABS. MONOCYTES 1.2 0.05 - 1.2 K/UL    ABS. EOSINOPHILS 0.2 0.0 - 0.4 K/UL    ABS. BASOPHILS 0.1 (H) 0.0 - 0.06 K/UL    DF AUTOMATED     METABOLIC PANEL, COMPREHENSIVE    Collection Time: 02/04/18 12:32 PM   Result Value Ref Range    Sodium 141 136 - 145 mmol/L    Potassium 4.2 3.5 - 5.5 mmol/L    Chloride 103 100 - 108 mmol/L    CO2 24 21 - 32 mmol/L    Anion gap 14 3.0 - 18 mmol/L    Glucose 121 (H) 74 - 99 mg/dL    BUN 12 7.0 - 18 MG/DL    Creatinine 0.78 0.6 - 1.3 MG/DL    BUN/Creatinine ratio 15 12 - 20      GFR est AA >60 >60 ml/min/1.73m2    GFR est non-AA >60 >60 ml/min/1.73m2    Calcium 9.3 8.5 - 10.1 MG/DL    Bilirubin, total 0.4 0.2 - 1.0 MG/DL    ALT (SGPT) 47 13 - 56 U/L    AST (SGOT) 26 15 - 37 U/L    Alk.  phosphatase 64 45 - 117 U/L    Protein, total 7.9 6.4 - 8.2 g/dL    Albumin 4.2 3.4 - 5.0 g/dL    Globulin 3.7 2.0 - 4.0 g/dL    A-G Ratio 1.1 0.8 - 1.7     BETA HCG, QT    Collection Time: 02/04/18 12:32 PM   Result Value Ref [place] [address] (H) 1.0 - 6.0 MIU/ML   DRUG SCREEN, URINE    Collection Time: 02/04/18  2:30 PM   Result Value Ref Range    BENZODIAZEPINES NEGATIVE  NEG      BARBITURATES NEGATIVE  NEG      THC (TH-CANNABINOL) NEGATIVE  NEG      OPIATES POSITIVE (A) NEG      PCP(PHENCYCLIDINE) NEGATIVE  NEG      COCAINE POSITIVE (A) NEG      AMPHETAMINES NEGATIVE  NEG      METHADONE NEGATIVE  NEG      HDSCOM (NOTE)    URINALYSIS W/ RFLX MICROSCOPIC    Collection Time: 02/04/18  2:30 PM   Result Value Ref Range    Color DARK YELLOW      Appearance CLOUDY      Specific gravity >1.030 (H) 1.005 - 1.030    pH (UA) 7.0 5.0 - 8.0      Protein 30 (A) NEG mg/dL    Glucose NEGATIVE  NEG mg/dL    Ketone 40 (A) NEG mg/dL    Bilirubin NEGATIVE  NEG      Blood NEGATIVE  NEG      Urobilinogen 1.0 0.2 - 1.0 EU/dL    Nitrites NEGATIVE  NEG      Leukocyte Esterase SMALL (A) NEG     URINE MICROSCOPIC ONLY    Collection Time: 02/04/18  2:30 PM   Result Value Ref Range    WBC 0 to 2 0 - 5 /hpf    RBC 0 to 1 0 - 5 /hpf    Epithelial cells 4+ 0 - 5 /lpf    Bacteria 1+ (A) NEG /hpf    Mucus FEW (A) NEG /lpf       Radiologic Studies -   US UTS TRANSVAGINAL OB   Final Result  IMPRESSION:     Single live intrauterine gestation with estimated age of 6 weeks 2 days.  No  apparent complications        CT Results  (Last 48 hours)    None        CXR Results  (Last 48 hours)    None          Medications given in the ED-  Medications   sodium chloride 0.9 % bolus infusion 1,000 mL (1,000 mL IntraVENous New Bag 2/4/18 8178)   promethazine (PHENERGAN) injection 25 mg (25 mg IntraMUSCular Given 2/4/18 1258)   morphine 4 mg (4 mg IntraVENous Given 2/4/18 1258)   sodium chloride 0.9 % bolus infusion 1,000 mL (1,000 mL IntraVENous New Bag 2/4/18 9888)   ondansetron (ZOFRAN) injection 4 mg (4 mg IntraVENous Given 2/4/18 1424)   diphenhydrAMINE (BENADRYL) injection 25 mg (25 mg IntraVENous Given 2/4/18 8757)   ceFAZolin (ANCEF) 1 g in sterile water (preservative free) 10 mL IV syringe (1 g IntraVENous Given 2/4/18 1743)   acetaminophen (TYLENOL) tablet 650 mg (650 mg Oral Given 2/4/18 1743)   ondansetron (ZOFRAN) injection 4 mg (4 mg IntraVENous Given 2/4/18 1743)         Medical Decision Making   I am the first provider for this patient. I reviewed the vital signs, available nursing notes, past medical history, past surgical history, family history and social history. Vital Signs-Reviewed the patient's vital signs. Pulse Oximetry Analysis - 100% on RA     Records Reviewed: Nursing Notes and Old Medical Records    Provider Notes (Medical Decision Making):     Procedures:  Procedures    ED Course:   12:34 PM Initial assessment performed. The patients presenting problems have been discussed, and they are in agreement with the care plan formulated and outlined with them. I have encouraged them to ask questions as they arise throughout their visit. 12:40 PM Pt is thrashing about, she understands the risk of receiving IV Morphine, which is Category C in pregnancy. She says she tolerated it well during previous admission, and is requesting pain medication for severe low back pain. 2:32 PM Pt still moaning. States her pain in her back is about a 7/10 and states this brings on her anxiety, which has become severe, with recurrent vomiting. I have discussed with her that its too soon for more Phenergan or Morphine. Benadryl is category B in pregnancy, and may help with anxiety. She has agreed to this while we wait for her UA and final disposition. 3:00 PM Discussed patient's history, exam, and available diagnostics results with Greg Pinto MD, Emergency Medicine, who agrees with consulting OB/GYN at this time and probable admission for intractable pain and N/V.    3:20 PM Pt is calmer at this time. Reexamined: she points to left lower back and LLQ for pain. Her drug screen was positive for cocaine and opiates. She adamantly denies any IVDA. ? withdrawal sx, although she has acted very similarly in past ED visits for other complaints. Seems to be behavioral/psych. Awaiting OB/GYN consult regarding further management of intractable pain/vomiting and leukocytosis in pregnancy. Blood and urine cultures sent. Urine culture from recent admission +probable skin/genital contamination, so UTI today unlikely. 3:33 PM Discussed patient's history, exam, and available diagnostics results with Trevor Toledo MD (ObGYN), who thinks that given pt's hx, labs, recent hospitalizations, and findings today it seems that pt could be discharged home after given Ancef and stress importance of follow up. However, if we feel that the pt needs to be admitted, then she recommends consulting the on call OB/GYN, as they have never seen her in the office as an established pt.      4:30 PM Discussed patient's history, exam, and available diagnostics results with Baldo Hayward DO (ObGYN), who will admit to medical.     5:30 PM Pt becoming much more anxious, screaming out periodically, vital signs remain normal. Due to pregnancy status, we are limited in medication options. Anxiolytics and antipsychotics are category C and D in pregnancy. She has received morphine, zofran, phenergan and benadryl in ED. Diagnosis and Disposition      Critical Care Time: 0 minutes    Core Measures:  For Hospitalized Patients:    1. Hospitalization Decision Time:  The decision to hospitalize the patient was made by Baldo Hayward DO (ObGYN) at 4:30 PM on 2/4/2018    2. Aspirin: Aspirin was not given because the patient did not present with a stroke at the time of their Emergency Department evaluation    4:40 PM  Patient is being admitted to the hospital by Baldo Hayward DO (ObGYN). The results of their tests and reasons for their admission have been discussed with them and/or available family. They convey agreement and understanding for the need to be admitted and for their admission diagnosis.       CONDITIONS ON ADMISSION:  Sepsis is not present at the time of admission. Deep Vein Thrombosis is not present at the time of admission. Thrombosis is not present at the time of admission. Urinary Tract Infection is possible at the time of admission. Pneumonia is not present at the time of admission. MRSA is not present at the time of admission. Wound infection is not present at the time of admission. Pressure Ulcer is not present at the time of admission. CLINICAL IMPRESSION:    1. Leukocytosis, unspecified type    2. Left flank pain    3. Intractable vomiting with nausea, unspecified vomiting type    4. Polysubstance abuse    5. 6 weeks gestation of pregnancy    6. Anxiety state        PLAN:  1. Admit to medical.  _______________________________    Attestations: This note is prepared by Macarena Vieira, acting as Scribe for [employer]. [employer]:  The scribe's documentation has been prepared under my direction and personally reviewed by me in its entirety.  I confirm that the note above accurately reflects all work, treatment, procedures, and medical decision making performed by me.  _______________________________

## 2018-02-04 NOTE — ED NOTES
TRANSFER - OUT REPORT:    Verbal report given to Janeth Padron RN on [facility]  being transferred to [address] for routine progression of care       Report consisted of patients Situation, Background, Assessment and   Recommendations(SBAR). Information from the following report(s) SBAR, ED Summary and Recent Results was reviewed with the receiving nurse. Lines:   Peripheral IV 02/04/18 Right Wrist (Active)   Site Assessment Clean, dry, & intact 2/4/2018 12:40 PM   Phlebitis Assessment 0 2/4/2018 12:40 PM   Infiltration Assessment 0 2/4/2018 12:40 PM   Dressing Status Clean, dry, & intact 2/4/2018 12:40 PM   Dressing Type Transparent 2/4/2018 12:40 PM   Hub Color/Line Status Blue;Flushed;Patent 2/4/2018 12:40 PM   Action Taken Blood drawn 2/4/2018 12:40 PM        Opportunity for questions and clarification was provided.       Patient transported with:   Registered Nurse

## 2018-02-04 NOTE — IP AVS SNAPSHOT
[address] [place] [address] [place] [address] 
[phone] Patient: J Luis Albarran MRN: [id number]:[id number] A check stacey indicates which time of day the medication should be taken. My Medications START taking these medications Instructions Each Dose to Equal  
 Morning Noon Evening Bedtime  
 prenatal 95-RIPN fum-folic-dha 28 mg [id number] mcg-200 mg Cmpk Commonly known as:  PRENATAL + DHA Your last dose was: Your next dose is: Take 1 Tab by mouth daily. 1 Tab * promethazine 25 mg suppository Commonly known as:  PHENERGAN Your last dose was: Your next dose is: Insert 1 Suppository into rectum every six (6) hours as needed for Nausea for up to 7 days. 25 mg  
    
   
   
   
  
 * promethazine 25 mg tablet Commonly known as:  PHENERGAN Your last dose was: Your next dose is: Take 1 Tab by mouth every six (6) hours as needed for Nausea. 25 mg  
    
   
   
   
  
 * Notice: This list has 2 medication(s) that are the same as other medications prescribed for you. Read the directions carefully, and ask your doctor or other care provider to review them with you. ASK your doctor about these medications Instructions Each Dose to Equal  
 Morning Noon Evening Bedtime  
 cephALEXin 500 mg capsule Commonly known as:  Norma Patrick Your last dose was: Your next dose is: Take 1 Cap by mouth four (4) times daily. 500 mg Where to Get Your Medications Information on where to get these meds will be given to you by the nurse or doctor. ! Ask your nurse or doctor about these medications  
  prenatal 29-TFRO fum-folic-dha 28 mg [id number] mcg-200 mg Cmpk  
 promethazine 25 mg suppository  
 promethazine 25 mg tablet

## 2018-02-04 NOTE — ED TRIAGE NOTES
Assumed care of patient. Patient presents with complaints of panic attack and lower back pain. Pt recently seen for Pylonephritis last week and was given keflex at discharge. Patient still complaining of lower back pain. Patient comes in flailing all over the bed, unable to calm down after numerous attempts to call patient down, states she is in pain. Patient states she stopped taking her Buspar and Lexapro because she is 6 weeks pregnant and was told with her last pregnancy to stop all psych meds due to pregnancy. Noted patient to be 6 weeks pregnant. Patient also noted to be vomiting at this time. Patient changed into gown and warm blankets provided. Call bell within reach of patient. Will continue to monitor and assess. Sepsis Screening completed    (  )Patient meets SIRS criteria. (x)Patient does not meet SIRS criteria.       SIRS Criteria is achieved when two or more of the following are present   Temperature < 96.8°F (36°C) or > 100.9°F (38.3°C)   Heart Rate > 90 beats per minute   Respiratory Rate > 20 breaths per minute   WBC count > 12,000 or <4,000 or > 10% bands

## 2018-02-04 NOTE — IP AVS SNAPSHOT
[address] [address] 
[phone] Patient: Trisha Pinto MRN: [id number] [id number] About your hospitalization You were admitted on:  February 4, 2018 You last received care in the:  [address] [place] [address] You were discharged on:  February 6, 2018 Why you were hospitalized Your primary diagnosis was:  Not on File Your diagnoses also included:  Intractable Nausea And Vomiting, Leukocytosis, Personality Disorder, Substance Abuse, Dehydration, Hypokalemia Follow-up Information Follow up With Details Comments Contact Info [facility]  [facility]osen to continue managing your healthcare needs [address] 
[phone] Gustavo Lau MD On 2/14/2018 Follow up appointment scheduled for February 14, 2018 at 9:45 a.m. [address] [facility] 150 
[phone] Discharge Orders None A check stacey indicates which time of day the medication should be taken. My Medications START taking these medications Instructions Each Dose to Equal  
 Morning Noon Evening Bedtime  
 prenatal 06-XZIA fum-folic-dha 28 mg [id number] mcg-200 mg Cmpk Commonly known as:  PRENATAL + DHA Your last dose was: Your next dose is: Take 1 Tab by mouth daily. 1 Tab * promethazine 25 mg suppository Commonly known as:  PHENERGAN Your last dose was: Your next dose is: Insert 1 Suppository into rectum every six (6) hours as needed for Nausea for up to 7 days. 25 mg  
    
   
   
   
  
 * promethazine 25 mg tablet Commonly known as:  PHENERGAN Your last dose was: Your next dose is: Take 1 Tab by mouth every six (6) hours as needed for Nausea. 25 mg  
    
   
   
   
  
 * Notice:   This list has 2 medication(s) that are the same as other medications prescribed for you. Read the directions carefully, and ask your doctor or other care provider to review them with you. ASK your doctor about these medications Instructions Each Dose to Equal  
 Morning Noon Evening Bedtime  
 cephALEXin 500 mg capsule Commonly known as:  Lindy Hodgson Your last dose was: Your next dose is: Take 1 Cap by mouth four (4) times daily. 500 mg Where to Get Your Medications Information on where to get these meds will be given to you by the nurse or doctor. ! Ask your nurse or doctor about these medications  
  prenatal 52-TFKO fum-folic-dha 28 mg [id number] mcg-200 mg Cmpk  
 promethazine 25 mg suppository  
 promethazine 25 mg tablet Discharge Instructions Abdominal Pain: Care Instructions Your Care Instructions Abdominal pain has many possible causes. Some aren't serious and get better on their own in a few days. Others need more testing and treatment. If your pain continues or gets worse, you need to be rechecked and may need more tests to find out what is wrong. You may need surgery to correct the problem. Don't ignore new symptoms, such as fever, nausea and vomiting, urination problems, pain that gets worse, and dizziness. These may be signs of a more serious problem. Your doctor may have recommended a follow-up visit in the next 8 to 12 hours. If you are not getting better, you may need more tests or treatment. The doctor has checked you carefully, but problems can develop later. If you notice any problems or new symptoms, get medical treatment right away. Follow-up care is a key part of your treatment and safety. Be sure to make and go to all appointments, and call your doctor if you are having problems. It's also a good idea to know your test results and keep a list of the medicines you take. How can you care for yourself at home? · Rest until you feel better. · To prevent dehydration, drink plenty of fluids, enough so that your urine is light yellow or clear like water. Choose water and other caffeine-free clear liquids until you feel better. If you have kidney, heart, or liver disease and have to limit fluids, talk with your doctor before you increase the amount of fluids you drink. · If your stomach is upset, eat mild foods, such as rice, dry toast or crackers, bananas, and applesauce. Try eating several small meals instead of two or three large ones. · Wait until 48 hours after all symptoms have gone away before you have spicy foods, alcohol, and drinks that contain caffeine. · Do not eat foods that are high in fat. · Avoid anti-inflammatory medicines such as aspirin, ibuprofen (Advil, Motrin), and naproxen (Aleve). These can cause stomach upset. Talk to your doctor if you take daily aspirin for another health problem. When should you call for help? Call 911 anytime you think you may need emergency care. For example, call if: 
? · You passed out (lost consciousness). ? · You pass maroon or very bloody stools. ? · You vomit blood or what looks like coffee grounds. ? · You have new, severe belly pain. ?Call your doctor now or seek immediate medical care if: 
? · Your pain gets worse, especially if it becomes focused in one area of your belly. ? · You have a new or higher fever. ? · Your stools are black and look like tar, or they have streaks of blood. ? · You have unexpected vaginal bleeding. ? · You have symptoms of a urinary tract infection. These may include: 
¨ Pain when you urinate. ¨ Urinating more often than usual. 
¨ Blood in your urine. ? · You are dizzy or lightheaded, or you feel like you may faint. ? Watch closely for changes in your health, and be sure to contact your doctor if: 
? · You are not getting better after 1 day (24 hours). Where can you learn more? Go to http://estephania.info/. Enter V544 in the search box to learn more about \"Abdominal Pain: Care Instructions. \" Current as of: March 20, 2017 Content Version: 11.4 © 7643-9122 [employer]. Care instructions adapted under license by [employer] (which disclaims liability or warranty for this information). If you have questions about a medical condition or this instruction, always ask your healthcare professional. [place] [address] any warranty or liability for your use of this information. Learning About Anxiety Disorders What are anxiety disorders? Anxiety disorders are a type of medical problem. They cause severe anxiety. When you feel anxious, you feel that something bad is about to happen. This feeling interferes with your life. These disorders include: · Generalized anxiety disorder. You feel worried and stressed about many everyday events and activities. This goes on for several months and disrupts your life on most days. · Panic disorder. You have repeated panic attacks. A panic attack is a sudden, intense fear or anxiety. It may make you feel short of breath. Your heart may pound. · Social anxiety disorder. You feel very anxious about what you will say or do in front of people. For example, you may be scared to talk or eat in public. This problem affects your daily life. · Phobias. You are very scared of a specific object, situation, or activity. For example, you may fear spiders, high places, or small spaces. What are the symptoms? Generalized anxiety disorder Symptoms may include: · Feeling worried and stressed about many things almost every day. · Feeling tired or irritable. You may have a hard time concentrating. · Having headaches or muscle aches. · Having a hard time getting to sleep or staying asleep. Panic disorder You may have repeated panic attacks when there is no reason for feeling afraid.  You may change your daily activities because you worry that you will have another attack. Symptoms may include: 
· Intense fear, terror, or anxiety. · Trouble breathing or very fast breathing. · Chest pain or tightness. · A heartbeat that races or is not regular. Social anxiety disorder Symptoms may include: · Fear about a social situation, such as eating in front of others or speaking in public. You may worry a lot. Or you may be afraid that something bad will happen. · Anxiety that can cause you to blush, sweat, and feel shaky. · A heartbeat that is faster than normal. 
· A hard time focusing. Phobias Symptoms may include: · More fear than most people of being around an object, being in a situation, or doing an activity. You might also be stressed about the chance of being around the thing you fear. · Worry about losing control, panicking, fainting, or having physical symptoms like a faster heartbeat when you are around the situation or object. How are these disorders treated? Anxiety disorders can be treated with medicines or counseling. A combination of both may be used. Medicines may include: · Antidepressants. These may help your symptoms by keeping chemicals in your brain in balance. · Benzodiazepines. These may give you short-term relief of your symptoms. Some people use cognitive-behavioral therapy. A therapist helps you learn to change stressful or bad thoughts into helpful thoughts. Lead a healthy lifestyle A healthy lifestyle may help you feel better. · Get at least 30 minutes of exercise on most days of the week. Walking is a good choice. · Eat a healthy diet. Include fruits, vegetables, lean proteins, and whole grains in your diet each day. · Try to go to bed at the same time every night. Try for 8 hours of sleep a night. · Find ways to manage stress. Try relaxation exercises. · Avoid alcohol and illegal drugs. Follow-up care is a key part of your treatment and safety.  Be sure to make and go to all appointments, and call your doctor if you are having problems. It's also a good idea to know your test results and keep a list of the medicines you take. Where can you learn more? Go to http://shruti-pilo.info/. Enter B291 in the search box to learn more about \"Learning About Anxiety Disorders. \" Current as of: May 12, 2017 Content Version: 11.4 © 0549-4260 [email or web address]. Care instructions adapted under license by [employer] (which disclaims liability or warranty for this information). If you have questions about a medical condition or this instruction, always ask your healthcare professional. [address] any warranty or liability for your use of this information. Abdominal Pain in Children: Care Instructions Your Care Instructions Abdominal pain has many possible causes. Some are not serious and get better on their own in a few days. Others need more testing and treatment. If your child's belly pain continues or gets worse, he or she may need more tests to find out what is wrong. Most cases of abdominal pain in children are caused by minor problems, such as stomach flu or constipation. Home treatment often is all that is needed to relieve them. Your doctor may have recommended a follow-up visit in the next 8 to 12 hours. Do not ignore new symptoms, such as fever, nausea and vomiting, urination problems, or pain that gets worse. These may be signs of a more serious problem. The doctor has checked your child carefully, but problems can develop later. If you notice any problems or new symptoms, get medical treatment right away. Follow-up care is a key part of your child's treatment and safety. Be sure to make and go to all appointments, and call your doctor if your child is having problems. It's also a good idea to know your child's test results and keep a list of the medicines your child takes. How can you care for your child at home? · Your child should rest until he or she feels better. · Give your child lots of fluids, enough so that the urine is light yellow or clear like water. This is very important if your child is vomiting or has diarrhea. Give your child sips of water or drinks such as Pedialyte or Infalyte. These drinks contain a mix of salt, sugar, and minerals. You can buy them at drugstores or grocery stores. Give these drinks as long as your child is throwing up or has diarrhea. Do not use them as the only source of liquids or food for more than 12 to 24 hours. · Feed your child mild foods, such as rice, dry toast or crackers, bananas, and applesauce. Try feeding your child several small meals instead of 2 or 3 large ones. · Do not give your child spicy foods, fruits other than bananas or applesauce, or drinks that contain caffeine until 48 hours after all your child's symptoms have gone away. · Do not feed your child foods that are high in fat. · Have your child take medicines exactly as directed. Call your doctor if you think your child is having a problem with his or her medicine. · Do not give your child aspirin, ibuprofen (Advil, Motrin), or naproxen (Aleve). These can cause stomach upset. When should you call for help? Call 911 anytime you think your child may need emergency care. For example, call if: 
? · Your child passes out (loses consciousness). ? · Your child vomits blood or what looks like coffee grounds. ? · Your child's stools are maroon or very bloody. ?Call your doctor now or seek immediate medical care if: 
? · Your child has new belly pain or his or her pain gets worse. ? · Your child's pain becomes focused in one area of his or her belly. ? · Your child has a new or higher fever. ? · Your child's stools are black and look like tar or have streaks of blood. ? · Your child has new or worse diarrhea or vomiting. ? · Your child has symptoms of a urinary tract infection. These may include: 
¨ Pain when he or she urinates. ¨ Urinating more often than usual. 
¨ Blood in his or her urine. ? Watch closely for changes in your child's health, and be sure to contact your doctor if: 
? · Your child does not get better as expected. Where can you learn more? Go to http://shruti-pilo.info/. Enter 0681 [id number] in the search box to learn more about \"Abdominal Pain in Children: Care Instructions. \" Current as of: March 20, 2017 Content Version: 11.4 © 9927-1966 [employer]. Care instructions adapted under license by [employer] (which disclaims liability or warranty for this information). If you have questions about a medical condition or this instruction, always ask your healthcare professional. [address] any warranty or liability for your use of this information. Anxiety Disorder: Care Instructions Your Care Instructions Anxiety is a normal reaction to stress. Difficult situations can cause you to have symptoms such as sweaty palms and a nervous feeling. In an anxiety disorder, the symptoms are far more severe. Constant worry, muscle tension, trouble sleeping, nausea and diarrhea, and other symptoms can make normal daily activities difficult or impossible. These symptoms may occur for no reason, and they can affect your work, school, or social life. Medicines, counseling, and self-care can all help. Follow-up care is a key part of your treatment and safety. Be sure to make and go to all appointments, and call your doctor if you are having problems. It's also a good idea to know your test results and keep a list of the medicines you take. How can you care for yourself at home? · Take medicines exactly as directed. Call your doctor if you think you are having a problem with your medicine. · Go to your counseling sessions and follow-up appointments. · Recognize and accept your anxiety. Then, when you are in a situation that makes you anxious, say to yourself, \"This is not an emergency. I feel uncomfortable, but I am not in danger. I can keep going even if I feel anxious. \" · Be kind to your body: ¨ Relieve tension with exercise or a massage. ¨ Get enough rest. 
¨ Avoid alcohol, caffeine, nicotine, and illegal drugs. They can increase your anxiety level and cause sleep problems. ¨ Learn and do relaxation techniques. See below for more about these techniques. · Engage your mind. Get out and do something you enjoy. Go to a [place] movie, or take a walk or hike. Plan your day. Having too much or too little to do can make you anxious. · Keep a record of your symptoms. Discuss your fears with a good friend or family member, or join a support group for people with similar problems. Talking to others sometimes relieves stress. · Get involved in social groups, or volunteer to help others. Being alone sometimes makes things seem worse than they are. · Get at least 30 minutes of exercise on most days of the week to relieve stress. Walking is a good choice. You also may want to do other activities, such as running, swimming, cycling, or playing tennis or team sports. Relaxation techniques Do relaxation exercises 10 to 20 minutes a day. You can play soothing, relaxing music while you do them, if you wish. · Tell others in your house that you are going to do your relaxation exercises. Ask them not to disturb you. · Find a comfortable place, away from all distractions and noise. · Lie down on your back, or sit with your back straight. · Focus on your breathing. Make it slow and steady. · Breathe in through your nose. Breathe out through either your nose or mouth. · Breathe deeply, filling up the area between your navel and your rib cage. Breathe so that your belly goes up and down. · Do not hold your breath. · Breathe like this for 5 to 10 minutes. Notice the feeling of calmness throughout your whole body. As you continue to breathe slowly and deeply, relax by doing the following for another 5 to 10 minutes: · Tighten and relax each muscle group in your body. You can begin at your toes and work your way up to your head. · Imagine your muscle groups relaxing and becoming heavy. · Empty your mind of all thoughts. · Let yourself relax more and more deeply. · Become aware of the state of calmness that surrounds you. · When your relaxation time is over, you can bring yourself back to alertness by moving your fingers and toes and then your hands and feet and then stretching and moving your entire body. Sometimes people fall asleep during relaxation, but they usually wake up shortly afterward. · Always give yourself time to return to full alertness before you drive a car or do anything that might cause an accident if you are not fully alert. Never play a relaxation tape while you drive a car. When should you call for help? Call 911 anytime you think you may need emergency care. For example, call if: 
? · You feel you cannot stop from hurting yourself or someone else. ? Keep the numbers for these national suicide hotlines: 0-897-443-TALK (4-466.548.8370) and 7-962-KJWGVFJ (9-143.120.5716). If you or someone you know talks about suicide or feeling hopeless, get help right away. ? Watch closely for changes in your health, and be sure to contact your doctor if: 
? · You have anxiety or fear that affects your life. ? · You have symptoms of anxiety that are new or different from those you had before. Where can you learn more? Go to http://shruti-pilo.info/. Enter P754 in the search box to learn more about \"Anxiety Disorder: Care Instructions. \" Current as of: May 12, 2017 Content Version: 11.4 © 0712-7211 [employer].  Care instructions adapted under license by [address] (which disclaims liability or warranty for this information). If you have questions about a medical condition or this instruction, always ask your healthcare professional. [address] any warranty or liability for your use of this information. Managing Side Effects of Chemotherapy: Care Instructions Your Care Instructions Cancer is often treated with medicines that destroy the cancer cells (chemotherapy). These medicines may slow cancer growth and prevent or stop the spread of cancer. Chemotherapy also can affect healthy cells and cause side effects. Most people can work and do their normal activities after and even during chemotherapy, but they may need to limit their schedules. Side effects of chemotherapy may include nausea and vomiting, loss of appetite, pain, and being tired. Some medicines can cause diarrhea or mouth sores. Your doctor may prescribe medicines to treat the side effects. Your doctor will advise you to take extra care to prevent illnesses and infections, because chemotherapy weakens your natural defenses. Follow-up care is a key part of your treatment and safety. Be sure to make and go to all appointments, and call your doctor if you are having problems. It's also a good idea to know your test results and keep a list of the medicines you take. How can you care for yourself at home? Medicines ? · Take your medicines exactly as prescribed. Call your doctor if you think you are having a problem with your medicine. You may get medicine for nausea and vomiting if you have these side effects. ?Nausea and vomiting ? · A light meal or snack before chemotherapy may help prevent nausea. If you do have nausea during your treatment, try eating earlier-at least an hour or two before your next treatment. After your treatment, you may want to wait one or more hours before you eat again. ? · Drink fluids with your meals and an hour before or after meals. ? · After vomiting has stopped for 1 hour, sip a rehydration drink, such as Powerade or Gatorade. ? · Drink plenty of fluids to prevent dehydration. Choose water and other caffeine-free clear liquids until you feel better. Try clear fluids, such as apple or grape juice mixed to half strength with water, rehydration drinks, weak tea with sugar, clear broth, and gelatin dessert. Do not drink citrus juices. If you have kidney, heart, or liver disease and have to limit fluids, talk with your doctor before you increase the amount of fluids you drink. ? · When you are feeling better, begin eating clear soups and mild foods until all symptoms are gone for 12 to 48 hours. Other good choices include dry toast, crackers, cooked cereal, and gelatin dessert, such as Jell-O.  
? · If your vomiting is not getting better or is getting worse, call your doctor right away. ? Loss of appetite ? · It's important to eat healthy food. If you do not feel like eating, try to eat food that has protein and extra calories to keep up your strength and prevent weight loss. You can drink liquid meal replacements for extra calories and protein. ? · Try eating several smaller meals throughout the day. Set a schedule for meals and snacks, and plan for times when it feels best to eat. Try to eat your main meal early. ? · After treatment, you may want to wait for a while to eat. You can also try eating earlier before treatment. ? · Try to eat more of the foods you like during the days and times when your appetite is good. ? · When you don't feel like eating your normal foods, try clear broths/soups and mild foods like toast, crackers, cooked cereal like oatmeal, and gelatin dessert. Eating soft, bland foods may help. ?Pain control ?  · If your doctor prescribes medicines to control pain, take them as directed. Often your doctor will have you take these medicines regularly to keep your pain under control. Medicine for pain may cause side effects. Let your doctor know if you feel constipated, have trouble urinating, or have nausea. ? · Try using relaxation exercises to lower your anxiety and stress, which can increase pain. ? · Keep track of your pain so you can tell your doctor what your pain is like. Write down where you feel pain, how long it lasts, what seems to bring it on, and how it feels. Also note what makes the pain feel better or worse. ? · If you have mouth pain, your doctor may prescribe a special mouth rinse that can help relieve the pain. ?Weakness and feeling tired ? · Get extra rest. Plan ahead so you can take breaks or naps. ? · Save your energy for the most important things you want to do. ? · Try to get some exercise, such as walking, but stop if you are too tired. ? · Eat a balanced diet. Do not skip meals, especially breakfast.  
? · Do something you enjoy. Do you like to listen to music? Spend some time listening to your favorite music. Or find another way to relax by reading, watching a movie, or playing games. ? · Ask family and friends to help with home chores and other tasks. ? To prevent infections ? · Wash your hands often during the day, especially before you eat and after you use the bathroom. ? · Stay away from people who have illnesses that you might catch, such as the flu or a cold. ? · Try to stay out of crowds. ? · Clean cuts and scrapes right away with warm water and soap. Clean them daily until they are healed. ? · Keep track of your temperature, if your doctor recommends it. You can do this by taking your temperature at regular times and writing it down. ? Hair loss ? · Use a mild shampoo and a soft hair brush. ? · Use a low setting on your hair dryer. Do not color or perm your hair. ? · Have your hair cut short. It will look thicker and clemens, and it will not be such a shock if you lose hair. ? · Use sunscreen and a hat, scarf, or turban to protect your scalp from the sun. ? · Ask your doctor about other treatments that you may try to prevent or minimize hair loss. These may include the use of a cooling cap. When should you call for help? Call 911 anytime you think you may need emergency care. For example, call if: 
? · You passed out (lost consciousness). ?Call your doctor now or seek immediate medical care if: 
? · You have a fever. ? · You have abnormal bleeding. ? · You have new or worse pain. ? · You think you have an infection. ? · You have new symptoms, such as a cough, belly pain, vomiting, diarrhea, or a rash. ? Watch closely for changes in your health, and be sure to contact your doctor if: 
? · You are much more tired than usual.  
? · You have swollen glands in your armpits, groin, or neck. ? · You do not get better as expected. Where can you learn more? Go to http://shruti-pilo.info/. Enter [phone] in the search box to learn more about \"Managing Side Effects of Chemotherapy: Care Instructions. \" Current as of: May 12, 2017 Content Version: 11.4 © 4753-1654 [employer]. Care instructions adapted under license by [employer] (which disclaims liability or warranty for this information). If you have questions about a medical condition or this instruction, always ask your healthcare professional. [place] [address] any warranty or liability for your use of this information. Patient armband removed and shredded DISCHARGE SUMMARY from Nurse PATIENT INSTRUCTIONS: 
 
 
F-face looks uneven A-arms unable to move or move unevenly S-speech slurred or non-existent T-time-call 911 as soon as signs and symptoms begin-DO NOT go Back to bed or wait to see if you get better-TIME IS BRAIN. Warning Signs of HEART ATTACK Call 911 if you have these symptoms: 
? Chest discomfort. Most heart attacks involve discomfort in the center of the chest that lasts more than a few minutes, or that goes away and comes back. It can feel like uncomfortable pressure, squeezing, fullness, or pain. ? Discomfort in other areas of the upper body. Symptoms can include pain or discomfort in one or both arms, the back, neck, jaw, or stomach. ? Shortness of breath with or without chest discomfort. ? Other signs may include breaking out in a cold sweat, nausea, or lightheadedness. Don't wait more than five minutes to call [address]! Fast action can save your life. Calling 911 is almost always the fastest way to get lifesaving treatment. Emergency Medical Services staff can begin treatment when they arrive  up to an hour sooner than if someone gets to the hospital by car. The discharge information has been reviewed with the patient. The patient verbalized understanding. Discharge medications reviewed with the patient and appropriate educational materials and side effects teaching were provided. ___________________________________________________________________________________________________________________________________ Extreme Nausea and Vomiting in Pregnancy: Care Instructions Your Care Instructions Nausea and vomiting (often called morning sickness) are common in pregnancy. They are caused by pregnancy hormones and happen most often in the first 3 months. Some women get very sick and are not able to keep down food and fluids. This extreme morning sickness is called hyperemesis gravidarum. It can lead to a dangerous loss of fluids in the body.  It also can keep you from gaining weight and getting proper nutrition during your pregnancy. Your body fluids are put back in balance with water and minerals called electrolytes. Medicine may help if you have severe nausea and vomiting. Follow-up care is a key part of your treatment and safety. Be sure to make and go to all appointments, and call your doctor if you are having problems. It's also a good idea to know your test results and keep a list of the medicines you take. How can you care for yourself at home? · Take your medicines exactly as prescribed. Call your doctor if you think you are having a problem with your medicine. · Drink plenty of fluids to prevent dehydration. Choose water and other caffeine-free clear liquids until you feel better. Try sipping on sports drinks that have salt and sugar in them. · Eat a small snack, such as crackers, before you get out of bed. Wait a few minutes, then get out of bed slowly. · Keep food in your stomach, but not too much at once. An empty stomach can make nausea worse. Eat several small meals every day instead of three large meals. · Eat more protein and less fat. · Get plenty of vitamin B6 by eating whole grains, nuts, seeds, and legumes. You can take vitamin B6 tablets if your doctor says it is okay. · Try to avoid smells and foods that make you feel sick to your stomach. · Get lots of rest. 
· You may want to try acupressure bands. They put pressure on an acupressure point in the wrist. Some women feel better using the bands. · Taylor may also help you feel better. You can use it in tea, take it as a pill, or use a taylor syrup that you can buy at a health food store. When should you call for help? Call 911 anytime you think you may need emergency care. For example, call if: 
? · You passed out (lost consciousness). ?Call your doctor now or seek immediate medical care if: 
? · You are sick to your stomach or cannot drink fluids. ? · You have symptoms of dehydration, such as: ¨ Dry eyes and a dry mouth. ¨ Passing only a little urine. ¨ Feeling thirstier than usual.  
? · You are not able to keep down your medicines. ? · You have pain in your belly or pelvis. ? Watch closely for changes in your health, and be sure to contact your doctor if: 
? · You do not get better as expected. Where can you learn more? Go to http://shruti-pilo.info/. Enter L299 in the search box to learn more about \"Extreme Nausea and Vomiting in Pregnancy: Care Instructions. \" Current as of: March 16, 2017 Content Version: 11.4 © 1067-1156 [employer]. Care instructions adapted under license by [employer] (which disclaims liability or warranty for this information). If you have questions about a medical condition or this instruction, always ask your healthcare professional. [place] [address] any warranty or liability for your use of this information. Introducing [facility]! Savana Rainey introduces TeraView patient portal. Now you can access parts of your medical record, email your doctor's office, and request medication refills online. 1. In your internet browser, go to [email or web address] 2. Click on the First Time User? Click Here link in the Sign In box. You will see the New Member Sign Up page. 3. Enter your TeraView Access Code exactly as it appears below. You will not need to use this code after youve completed the sign-up process. If you do not sign up before the expiration date, you must request a new code. · TeraView Access Code: [email or web address] Expires: 4/20/2018 11:41 AM 
 
4. Enter the last four digits of your Social Security Number (xxxx) and Date of Birth (mm/dd/yyyy) as indicated and click Submit. You will be taken to the next sign-up page. 5. Create a TeraView ID.  This will be your TeraView login ID and cannot be changed, so think of one that is secure and easy to remember. 6. Create a [facility] password. You can change your password at any time. 7. Enter your Password Reset Question and Answer. This can be used at a later time if you forget your password. 8. Enter your e-mail address. You will receive e-mail notification when new information is available in [address] 9. Click Sign Up. You can now view and download portions of your medical record. 10. Click the Download Summary menu link to download a portable copy of your medical information. If you have questions, please visit the Frequently Asked Questions section of the [facility] website. Remember, [facility] is NOT to be used for urgent needs. For medical emergencies, dial 911. Now available from your iPhone and Android! [facility]-Please follow up with your PCP about these lab tests Order Current Status CULTURE, BLOOD Preliminary result CULTURE, BLOOD Preliminary result Providers Seen During Your Hospitalization Provider Specialty Primary office phone Kelsey Ladd MD Emergency Medicine [phone] Roly Oconnor DO Obstetrics & Gynecology [phone] Your Primary Care Physician (PCP) Primary Care Physician Office Phone Office Fax NONE ** None ** ** None ** You are allergic to the following No active allergies Recent Documentation Height Weight BMI OB Status Smoking Status 1.499 m 49.9 kg 22.2 kg/m2 Pregnant Current Every Day Smoker Emergency Contacts Name Discharge Info Relation Home Work Mobile Jyothi Alcala DISCHARGE CAREGIVER [3] Parent [1] [phone] Patient Belongings The following personal items are in your possession at time of discharge: 
     Visual Aid: None             Clothing: Shirt, Slippers, Socks, Pajamas    Other Valuables: Odalis Ardon Please provide this summary of care documentation to your next provider. Signatures-by signing, you are acknowledging that this After Visit Summary has been reviewed with you and you have received a copy. Patient Signature:  ____________________________________________________________ Date:  ____________________________________________________________  
  
Alannah Keto Provider Signature:  ____________________________________________________________ Date:  ____________________________________________________________

## 2018-02-05 ENCOUNTER — APPOINTMENT (OUTPATIENT)
Dept: ULTRASOUND IMAGING | Age: 33
DRG: 781 | End: 2018-02-05
Attending: HOSPITALIST
Payer: SELF-PAY

## 2018-02-05 ENCOUNTER — APPOINTMENT (OUTPATIENT)
Dept: GENERAL RADIOLOGY | Age: 33
DRG: 781 | End: 2018-02-05
Attending: OBSTETRICS & GYNECOLOGY
Payer: SELF-PAY

## 2018-02-05 PROBLEM — D72.829 LEUKOCYTOSIS: Status: ACTIVE | Noted: 2018-02-05

## 2018-02-05 LAB
ALBUMIN SERPL-MCNC: 3.5 G/DL (ref 3.4–5)
ALBUMIN/GLOB SERPL: 1.1 {RATIO} (ref 0.8–1.7)
ALP SERPL-CCNC: 56 U/L (ref 45–117)
ALT SERPL-CCNC: 40 U/L (ref 13–56)
ANION GAP SERPL CALC-SCNC: 12 MMOL/L (ref 3–18)
AST SERPL-CCNC: 34 U/L (ref 15–37)
BILIRUB SERPL-MCNC: 0.4 MG/DL (ref 0.2–1)
BUN SERPL-MCNC: 6 MG/DL (ref 7–18)
BUN/CREAT SERPL: 9 (ref 12–20)
CALCIUM SERPL-MCNC: 8.4 MG/DL (ref 8.5–10.1)
CHLORIDE SERPL-SCNC: 105 MMOL/L (ref 100–108)
CO2 SERPL-SCNC: 25 MMOL/L (ref 21–32)
CREAT SERPL-MCNC: 0.68 MG/DL (ref 0.6–1.3)
ERYTHROCYTE [DISTWIDTH] IN BLOOD BY AUTOMATED COUNT: 15.4 % (ref 11.6–14.5)
GLOBULIN SER CALC-MCNC: 3.2 G/DL (ref 2–4)
GLUCOSE SERPL-MCNC: 123 MG/DL (ref 74–99)
HCT VFR BLD AUTO: 35.9 % (ref 35–45)
HGB BLD-MCNC: 11.8 G/DL (ref 12–16)
MCH RBC QN AUTO: 27.8 PG (ref 24–34)
MCHC RBC AUTO-ENTMCNC: 32.9 G/DL (ref 31–37)
MCV RBC AUTO: 84.5 FL (ref 74–97)
PLATELET # BLD AUTO: 337 K/UL (ref 135–420)
PMV BLD AUTO: 10.7 FL (ref 9.2–11.8)
POTASSIUM SERPL-SCNC: 3.4 MMOL/L (ref 3.5–5.5)
PROT SERPL-MCNC: 6.7 G/DL (ref 6.4–8.2)
RBC # BLD AUTO: 4.25 M/UL (ref 4.2–5.3)
SODIUM SERPL-SCNC: 142 MMOL/L (ref 136–145)
WBC # BLD AUTO: 26.3 K/UL (ref 4.6–13.2)

## 2018-02-05 PROCEDURE — 85027 COMPLETE CBC AUTOMATED: CPT | Performed by: OBSTETRICS & GYNECOLOGY

## 2018-02-05 PROCEDURE — 74011250636 HC RX REV CODE- 250/636: Performed by: OBSTETRICS & GYNECOLOGY

## 2018-02-05 PROCEDURE — 80053 COMPREHEN METABOLIC PANEL: CPT | Performed by: OBSTETRICS & GYNECOLOGY

## 2018-02-05 PROCEDURE — 74011250637 HC RX REV CODE- 250/637: Performed by: OBSTETRICS & GYNECOLOGY

## 2018-02-05 PROCEDURE — 76770 US EXAM ABDO BACK WALL COMP: CPT

## 2018-02-05 PROCEDURE — 36415 COLL VENOUS BLD VENIPUNCTURE: CPT | Performed by: OBSTETRICS & GYNECOLOGY

## 2018-02-05 PROCEDURE — 74011000250 HC RX REV CODE- 250: Performed by: OBSTETRICS & GYNECOLOGY

## 2018-02-05 PROCEDURE — 65270000029 HC RM PRIVATE

## 2018-02-05 PROCEDURE — 87040 BLOOD CULTURE FOR BACTERIA: CPT | Performed by: HOSPITALIST

## 2018-02-05 PROCEDURE — 71046 X-RAY EXAM CHEST 2 VIEWS: CPT

## 2018-02-05 PROCEDURE — 74011250637 HC RX REV CODE- 250/637: Performed by: INTERNAL MEDICINE

## 2018-02-05 RX ORDER — LORAZEPAM 2 MG/ML
0.5 INJECTION INTRAMUSCULAR ONCE
Status: COMPLETED | OUTPATIENT
Start: 2018-02-05 | End: 2018-02-05

## 2018-02-05 RX ORDER — SODIUM CHLORIDE 0.9 % (FLUSH) 0.9 %
5-10 SYRINGE (ML) INJECTION AS NEEDED
Status: DISCONTINUED | OUTPATIENT
Start: 2018-02-05 | End: 2018-02-06 | Stop reason: HOSPADM

## 2018-02-05 RX ORDER — DIPHENHYDRAMINE HYDROCHLORIDE 50 MG/ML
12.5 INJECTION, SOLUTION INTRAMUSCULAR; INTRAVENOUS
Status: DISCONTINUED | OUTPATIENT
Start: 2018-02-05 | End: 2018-02-06 | Stop reason: HOSPADM

## 2018-02-05 RX ORDER — METOCLOPRAMIDE HYDROCHLORIDE 5 MG/ML
10 INJECTION INTRAMUSCULAR; INTRAVENOUS
Status: DISCONTINUED | OUTPATIENT
Start: 2018-02-05 | End: 2018-02-06 | Stop reason: HOSPADM

## 2018-02-05 RX ORDER — LORAZEPAM 2 MG/ML
0.5 INJECTION INTRAMUSCULAR
Status: DISCONTINUED | OUTPATIENT
Start: 2018-02-05 | End: 2018-02-05

## 2018-02-05 RX ORDER — IBUPROFEN 200 MG
1 TABLET ORAL DAILY
Status: DISCONTINUED | OUTPATIENT
Start: 2018-02-05 | End: 2018-02-06 | Stop reason: HOSPADM

## 2018-02-05 RX ORDER — SODIUM CHLORIDE 0.9 % (FLUSH) 0.9 %
5-10 SYRINGE (ML) INJECTION EVERY 8 HOURS
Status: DISCONTINUED | OUTPATIENT
Start: 2018-02-05 | End: 2018-02-06 | Stop reason: HOSPADM

## 2018-02-05 RX ORDER — IBUPROFEN 200 MG
1 TABLET ORAL DAILY
Status: DISCONTINUED | OUTPATIENT
Start: 2018-02-06 | End: 2018-02-05

## 2018-02-05 RX ORDER — LORAZEPAM 2 MG/ML
0.5 INJECTION INTRAMUSCULAR
Status: DISCONTINUED | OUTPATIENT
Start: 2018-02-05 | End: 2018-02-06 | Stop reason: HOSPADM

## 2018-02-05 RX ORDER — OXYCODONE AND ACETAMINOPHEN 5; 325 MG/1; MG/1
1 TABLET ORAL
Status: DISCONTINUED | OUTPATIENT
Start: 2018-02-05 | End: 2018-02-06 | Stop reason: HOSPADM

## 2018-02-05 RX ADMIN — SODIUM CHLORIDE 25 MG: 9 INJECTION INTRAMUSCULAR; INTRAVENOUS; SUBCUTANEOUS at 17:25

## 2018-02-05 RX ADMIN — PIPERACILLIN SODIUM AND TAZOBACTAM SODIUM 3.38 G: .375; 3 INJECTION, POWDER, LYOPHILIZED, FOR SOLUTION INTRAVENOUS at 18:19

## 2018-02-05 RX ADMIN — LORAZEPAM 0.5 MG: 2 INJECTION INTRAMUSCULAR; INTRAVENOUS at 23:57

## 2018-02-05 RX ADMIN — LORAZEPAM 0.5 MG: 2 INJECTION INTRAMUSCULAR; INTRAVENOUS at 14:19

## 2018-02-05 RX ADMIN — LORAZEPAM 0.5 MG: 2 INJECTION INTRAMUSCULAR; INTRAVENOUS at 18:19

## 2018-02-05 RX ADMIN — OXYCODONE HYDROCHLORIDE AND ACETAMINOPHEN 1 TABLET: 5; 325 TABLET ORAL at 18:19

## 2018-02-05 RX ADMIN — ONDANSETRON 4 MG: 2 INJECTION INTRAMUSCULAR; INTRAVENOUS at 02:23

## 2018-02-05 RX ADMIN — OXYCODONE HYDROCHLORIDE AND ACETAMINOPHEN 2 TABLET: 5; 325 TABLET ORAL at 05:05

## 2018-02-05 RX ADMIN — Medication 10 ML: at 13:01

## 2018-02-05 RX ADMIN — PIPERACILLIN SODIUM AND TAZOBACTAM SODIUM 3.38 G: .375; 3 INJECTION, POWDER, LYOPHILIZED, FOR SOLUTION INTRAVENOUS at 23:30

## 2018-02-05 RX ADMIN — DIPHENHYDRAMINE HYDROCHLORIDE 12.5 MG: 50 INJECTION, SOLUTION INTRAMUSCULAR; INTRAVENOUS at 21:48

## 2018-02-05 RX ADMIN — SODIUM CHLORIDE 25 MG: 9 INJECTION INTRAMUSCULAR; INTRAVENOUS; SUBCUTANEOUS at 23:30

## 2018-02-05 RX ADMIN — ACETAMINOPHEN 650 MG: 325 TABLET, FILM COATED ORAL at 09:06

## 2018-02-05 RX ADMIN — PIPERACILLIN SODIUM AND TAZOBACTAM SODIUM 3.38 G: .375; 3 INJECTION, POWDER, LYOPHILIZED, FOR SOLUTION INTRAVENOUS at 12:04

## 2018-02-05 RX ADMIN — SODIUM CHLORIDE 25 MG: 9 INJECTION INTRAMUSCULAR; INTRAVENOUS; SUBCUTANEOUS at 04:00

## 2018-02-05 RX ADMIN — LORAZEPAM 0.5 MG: 2 INJECTION INTRAMUSCULAR; INTRAVENOUS at 10:47

## 2018-02-05 RX ADMIN — POTASSIUM CHLORIDE: 2 INJECTION, SOLUTION, CONCENTRATE INTRAVENOUS at 12:03

## 2018-02-05 RX ADMIN — ONDANSETRON 4 MG: 2 INJECTION INTRAMUSCULAR; INTRAVENOUS at 09:07

## 2018-02-05 RX ADMIN — OXYCODONE HYDROCHLORIDE AND ACETAMINOPHEN 1 TABLET: 5; 325 TABLET ORAL at 12:59

## 2018-02-05 NOTE — ROUTINE PROCESS
Bedside and Verbal shift change report given to Delia Nuñez RN (oncoming nurse) by Elizabeth Zuniga RN  (offgoing nurse). Report included the following information SBAR, Kardex, Intake/Output and MAR.

## 2018-02-05 NOTE — ROUTINE PROCESS
Shift summary :   1310: Patient transferred to ultrasound, began to yell at transporter. Able to calm pt down. 1415: Pt returned back to room, Pt thrusting in bed administered 0.5 Ativan IV.   1235: Pt receiving spa bath to help to relax. Sitter at bedside. 1500: Pt screaming hysterically in hallway stating she can't have any privacy. Pt throws phone and begins to jump up and down and scream. Redirected patient to room to prevent injuries to self and others. Charge nurse provides pt with AMA paper and informs pt the behavior that is acceptable on unit. Pt denies wanting to leave and becomes more emotional.   1515: Informed Dr. José Smith and paged OB. Waiting on response from OB [facility]). Pt has been visibly sweating, pt states that she is cold after having 3 blankets applied. Pt IV access infiltrated, notified oncoming nurse.

## 2018-02-05 NOTE — H&P
History & Physical    Name: Griffin Barnhart MRN: [id number]  SSN: [id number]    YOB: 1985  Age: 29 y.o. Sex: female      Subjective:     Reason for Admission:  Pregnancy and Hyperemesis Gravidarum and Left back pain, polysubstance abuse (cocaine, hx of heroin), severe anxiety, and leukocytosis    History of Present Illness: Griffin Barnhart is a 29 y.o. [id number]  female with an estimated gestational age of 9 wks. 1 Eab, 1 Sab, 1 twin delivery at 32 wks, only one survived. Pt does not have custody of 2 yr old. . Patient complains of moderate nausea/vomiting and Left flank pain for 2 days. Pregnancy has been complicated by severe anxiety, polysubstance abuse, hx of Hepatitis C. Patient denies chest pain, contractions, fever, headache , right upper quadrant pain  , shortness of breath and vaginal bleeding . Pt was admitted 2018 for pyelonephritis. Was also seen by psych at that time, dx with dysthymic disorder and personality disorder, unspecified (borderline and antisocial personality disorder). Today pt denies heroin use and denies cocaine use (\"don't know how I was exposed to that\"). OB History    Para Term  AB Living   2 0 0 0 0    SAB TAB Ectopic Molar Multiple Live Births   0 0 0 0        # Outcome Date GA Lbr Villa/2nd Weight Sex Delivery Anes PTL Lv   2 Current            1                  Past Medical History:   Diagnosis Date    Anxiety     Asthma     Depression     Drug use     Endometriosis     Hepatitis C     Heroin abuse     Kidney infection      Past Surgical History:   Procedure Laterality Date    HX DILATION AND CURETTAGE      HX LEEP PROCEDURE       Social History     Occupational History    Not on file.      Social History Main Topics    Smoking status: Current Every Day Smoker     Packs/day: 0.50     Years: 1.00    Smokeless tobacco: Never Used    Alcohol use Yes      Comment: Occasionally     Drug use: Yes     Special: Other, Heroin Comment: spice    Sexual activity: Yes     Birth control/ protection: None     History reviewed. No pertinent family history. No Known Allergies  Prior to Admission medications    Medication Sig Start Date End Date Taking? Authorizing Provider   cephALEXin (KEFLEX) 500 mg capsule Take 1 Cap by mouth four (4) times daily. 18  Yes Frandy Jackson MD   escitalopram oxalate (LEXAPRO) 10 mg tablet Take 10 mg by mouth daily. Zamzam Belle MD        Review of Systems   Constitutional: Negative for chills and fever. Extremely anxious   Eyes: Negative. Respiratory:        Productive cough- yellow (nothing different than normal)   Cardiovascular: Negative. Gastrointestinal: Positive for nausea and vomiting. Negative for abdominal distention, anal bleeding, blood in stool, constipation and diarrhea. LLQ pain   Genitourinary: Positive for flank pain. Negative for decreased urine volume, difficulty urinating, dyspareunia, dysuria, enuresis, frequency, genital sores, hematuria, menstrual problem, pelvic pain and urgency. Psychiatric/Behavioral: Positive for agitation and behavioral problems. The patient is nervous/anxious. Objective:     Vitals:    Vitals:    18 2329 18 0022 18 0324 18 0805   BP: 102/66  115/69 133/60   Pulse: 97  87 90   Resp: 15  16 18   Temp: 98 °F (36.7 °C)  99.1 °F (37.3 °C) 98.7 °F (37.1 °C)   SpO2: 100%  100% 100%   Weight:  49.9 kg (109 lb 14.4 oz)     Height:          Temp (24hrs), Av.2 °F (36.8 °C), Min:96.9 °F (36.1 °C), Max:99.4 °F (37.4 °C)    BP  Min: 102/66  Max: 147/104     Physical Exam   Constitutional: She is oriented to person, place, and time. She appears well-developed and well-nourished. She appears distressed. Neck: Normal range of motion. Neck supple. Cardiovascular: Normal rate, regular rhythm, normal heart sounds and intact distal pulses. Pulmonary/Chest: Effort normal and breath sounds normal.   Abdominal: Soft.  Bowel sounds are normal.   Musculoskeletal: Normal range of motion. Left sided back pain, lower than flank, negative Alex's. No evidence of track marks, infected IV site   Neurological: She is alert and oriented to person, place, and time. She has normal reflexes. Skin: Skin is warm and dry. Psychiatric:   Extremely anxious, inappropriate       Cervical Exam: deferred  IUP at 6 wks on ultrasound yesterday, FHTs:       Labs:   Recent Results (from the past 24 hour(s))   CBC WITH AUTOMATED DIFF    Collection Time: 02/04/18 12:32 PM   Result Value Ref Range    WBC 20.1 (H) 4.6 - 13.2 K/uL    RBC 4.82 4.20 - 5.30 M/uL    HGB 13.7 12.0 - 16.0 g/dL    HCT 40.6 35.0 - 45.0 %    MCV 84.2 74.0 - 97.0 FL    MCH 28.4 24.0 - 34.0 PG    MCHC 33.7 31.0 - 37.0 g/dL    RDW 15.3 (H) 11.6 - 14.5 %    PLATELET 399 531 - 564 K/uL    MPV 10.8 9.2 - 11.8 FL    NEUTROPHILS 73 40 - 73 %    LYMPHOCYTES 20 (L) 21 - 52 %    MONOCYTES 6 3 - 10 %    EOSINOPHILS 1 0 - 5 %    BASOPHILS 0 0 - 2 %    ABS. NEUTROPHILS 14.7 (H) 1.8 - 8.0 K/UL    ABS. LYMPHOCYTES 4.0 (H) 0.9 - 3.6 K/UL    ABS. MONOCYTES 1.2 0.05 - 1.2 K/UL    ABS. EOSINOPHILS 0.2 0.0 - 0.4 K/UL    ABS. BASOPHILS 0.1 (H) 0.0 - 0.06 K/UL    DF AUTOMATED     METABOLIC PANEL, COMPREHENSIVE    Collection Time: 02/04/18 12:32 PM   Result Value Ref Range    Sodium 141 136 - 145 mmol/L    Potassium 4.2 3.5 - 5.5 mmol/L    Chloride 103 100 - 108 mmol/L    CO2 24 21 - 32 mmol/L    Anion gap 14 3.0 - 18 mmol/L    Glucose 121 (H) 74 - 99 mg/dL    BUN 12 7.0 - 18 MG/DL    Creatinine 0.78 0.6 - 1.3 MG/DL    BUN/Creatinine ratio 15 12 - 20      GFR est AA >60 >60 ml/min/1.73m2    GFR est non-AA >60 >60 ml/min/1.73m2    Calcium 9.3 8.5 - 10.1 MG/DL    Bilirubin, total 0.4 0.2 - 1.0 MG/DL    ALT (SGPT) 47 13 - 56 U/L    AST (SGOT) 26 15 - 37 U/L    Alk.  phosphatase 64 45 - 117 U/L    Protein, total 7.9 6.4 - 8.2 g/dL    Albumin 4.2 3.4 - 5.0 g/dL    Globulin 3.7 2.0 - 4.0 g/dL    A-G Ratio 1.1 0.8 - 1. 7     BETA HCG, QT    Collection Time: 02/04/18 12:32 PM   Result Value [place] [address] (H) 1.0 - 6.0 MIU/ML   CULTURE, BLOOD    Collection Time: 02/04/18 12:32 PM   Result Value Ref Range    Special Requests: NO SPECIAL REQUESTS      Culture result: NO GROWTH AFTER 16 HOURS     DRUG SCREEN, URINE    Collection Time: 02/04/18  2:30 PM   Result Value Ref Range    BENZODIAZEPINES NEGATIVE  NEG      BARBITURATES NEGATIVE  NEG      THC (TH-CANNABINOL) NEGATIVE  NEG      OPIATES POSITIVE (A) NEG      PCP(PHENCYCLIDINE) NEGATIVE  NEG      COCAINE POSITIVE (A) NEG      AMPHETAMINES NEGATIVE  NEG      METHADONE NEGATIVE  NEG      HDSCOM (NOTE)    URINALYSIS W/ RFLX MICROSCOPIC    Collection Time: 02/04/18  2:30 PM   Result Value Ref Range    Color DARK YELLOW      Appearance CLOUDY      Specific gravity >1.030 (H) 1.005 - 1.030    pH (UA) 7.0 5.0 - 8.0      Protein 30 (A) NEG mg/dL    Glucose NEGATIVE  NEG mg/dL    Ketone 40 (A) NEG mg/dL    Bilirubin NEGATIVE  NEG      Blood NEGATIVE  NEG      Urobilinogen 1.0 0.2 - 1.0 EU/dL    Nitrites NEGATIVE  NEG      Leukocyte Esterase SMALL (A) NEG     URINE MICROSCOPIC ONLY    Collection Time: 02/04/18  2:30 PM   Result Value Ref Range    WBC 0 to 2 0 - 5 /hpf    RBC 0 to 1 0 - 5 /hpf    Epithelial cells 4+ 0 - 5 /lpf    Bacteria 1+ (A) NEG /hpf    Mucus FEW (A) NEG /lpf   CBC W/O DIFF    Collection Time: 02/05/18  2:41 AM   Result Value Ref Range    WBC 26.3 (H) 4.6 - 13.2 K/uL    RBC 4.25 4.20 - 5.30 M/uL    HGB 11.8 (L) 12.0 - 16.0 g/dL    HCT 35.9 35.0 - 45.0 %    MCV 84.5 74.0 - 97.0 FL    MCH 27.8 24.0 - 34.0 PG    MCHC 32.9 31.0 - 37.0 g/dL    RDW 15.4 (H) 11.6 - 14.5 %    PLATELET 015 287 - 423 K/uL    MPV 10.7 9.2 - 14.9 FL   METABOLIC PANEL, COMPREHENSIVE    Collection Time: 02/05/18  2:41 AM   Result Value Ref Range    Sodium 142 136 - 145 mmol/L    Potassium 3.4 (L) 3.5 - 5.5 mmol/L    Chloride 105 100 - 108 mmol/L    CO2 25 21 - 32 mmol/L    Anion gap 12 3.0 - 18 mmol/L    Glucose 123 (H) 74 - 99 mg/dL    BUN 6 (L) 7.0 - 18 MG/DL    Creatinine 0.68 0.6 - 1.3 MG/DL    BUN/Creatinine ratio 9 (L) 12 - 20      GFR est AA >60 >60 ml/min/1.73m2    GFR est non-AA >60 >60 ml/min/1.73m2    Calcium 8.4 (L) 8.5 - 10.1 MG/DL    Bilirubin, total 0.4 0.2 - 1.0 MG/DL    ALT (SGPT) 40 13 - 56 U/L    AST (SGOT) 34 15 - 37 U/L    Alk. phosphatase 56 45 - 117 U/L    Protein, total 6.7 6.4 - 8.2 g/dL    Albumin 3.5 3.4 - 5.0 g/dL    Globulin 3.2 2.0 - 4.0 g/dL    A-G Ratio 1.1 0.8 - 1.7         Assessment and Plan: Active Problems:    Intractable nausea and vomiting (4/21/2015)     -IUP at 6 wks (pt states plans on terminating)  - Hyperemesis Gravidarum:  Antiemetic Therapy including Phenergan/Zofran/Reglan/Zantac  Aggressive intravenous hydration  Check urine for ketones until trace results  Dietary consult  Strict Input and Output and Daily weights   consult   -Leukocytosis: start on zosyn, empirically treating (awaiting blood cultures, neg UA), consult medicine  - Pyelonephritis:Start IV :zosyn, renal ultrasound  -Severe Anxiety: Spoke with Dr Val Tavarez who saw pt last admission, pt with personality d/o, anxiety/depression- he recommends small doses of ativan and/or consider seroquel 25 mg bid. Pt does need to f/u on outpatient basis with [facility] for counselling/anger management.  Reviewed with pt risks of ativan and seroquel in pregnancy   -Leukocytosis: start on zosyn, daily CBC  -hx of exercise induced asthma (not on tx)  -hx of Hepatitis C (LFTs wnl)      Signed By:  Cammie Israel MD     February 5, 2018             renal

## 2018-02-05 NOTE — CONSULTS
Medicine Consult    Patient:  Zak Peoples 29 y.o. female  Asked to evaluate patient by Dr. Sandhya Hawk   Primary Care Provider:  None  Date of Admission:  2/4/2018  Reason for Consult:  Medical management         Assessment/Plan     Hospital Problems  Date Reviewed: 4/22/2015          Codes Class Noted POA    Leukocytosis ICD-10-CM: D72.829  ICD-9-CM: 288.60  2/5/2018 Unknown        Personality disorder ICD-10-CM: F60.9  ICD-9-CM: 301.9  1/20/2018 Yes    Overview Signed 1/20/2018  3:55 PM by Ivett Jett MD     (borderline and antisocial personality disorder). Intractable nausea and vomiting ICD-10-CM: R11.2  ICD-9-CM: 536.2  4/21/2015 Unknown              PLAN:  1. Leukocytosis   With left shift, recent treated as pyelo   Will have blood cx, renal ultrasound to looking for possible abscess   Will continue zosyn, urine cx from last visit:   10,000   [place], PROBABLE SKIN/GENITAL CONTAMINATION.          Will continue f/u   cx per primary team   Will repeat urine cx   2. Hypokalemia   k replacement, will check mg.     3 hyperemesis  Will continue hydration,   Monitor electrolytes     4 personal  Disorder  Psych will be involved     5 substance abuse   Cocaine positive   And opiate   Supportive care  Thank you for allowing us to participate in  Pt's care and will follow   HPI:   CC:I am anemic   Zak Peoples is a 29 y.o. female with past medical history significant for substance abuse, 6 wk preganncy w was admitted to ob servic for hyperemesis. Hospital medicine is consulted for medical management. She was recent treated as pyelonephritis with urine cx results <10 , 000. She reported she has chills all the time and she is anemic, then she started yelling and crying/rolling her body left and right on her bed. She was found wbc 26, ua trace leukocytes  Esterase on 2/4 . cxr pending.       Past Medical History:   Diagnosis Date    Anxiety     Asthma     Depression     Drug use     Endometriosis     Hepatitis C     Heroin abuse     Kidney infection      Past Surgical History:   Procedure Laterality Date    HX DILATION AND CURETTAGE      HX LEEP PROCEDURE        Social History   Substance Use Topics    Smoking status: Current Every Day Smoker     Packs/day: 0.50     Years: 1.00    Smokeless tobacco: Never Used    Alcohol use Yes      Comment: Occasionally      History reviewed. No pertinent family history. No current facility-administered medications on file prior to encounter. Current Outpatient Prescriptions on File Prior to Encounter   Medication Sig Dispense Refill    cephALEXin (KEFLEX) 500 mg capsule Take 1 Cap by mouth four (4) times daily. 28 Cap 0    escitalopram oxalate (LEXAPRO) 10 mg tablet Take 10 mg by mouth daily. No Known Allergies        Review of Systems  Unable to obtain due to her psychosis . Physical Exam:      Visit Vitals    /60 (BP 1 Location: Left arm, BP Patient Position: At rest)    Pulse 90    Temp 98.7 °F (37.1 °C)    Resp 18    Ht 4' 11\" (1.499 m)    Wt 49.9 kg (109 lb 14.4 oz)    SpO2 100%    BMI 22.2 kg/m2     Body mass index is 22.2 kg/(m^2).     Physical Exam:  GEN: well nourished, laying in bed in no acute distress  HEENT: atraumatic, nose normal,oropharynx clear, MMM  NECK: supple, trachea midline, no supraclavicular or submandibular adenopathy noted  EYES: conjuctiva normal, lids with out lesions, PERRL  HEART: RRR with out m/r/g, pmi nondisplaced, pulses 2+ distally  LUNGS: equal chest wall expansion, cta bl with out wheezes/rales or rhonchi  AB: soft, +BS, nt/nd no organomegaly  NEURO: alert, awake and oriented x3, yelling/crying during exam   SKIN: dry, intact,         Laboratory Studies:    BMP:   Lab Results   Component Value Date/Time     02/05/2018 02:41 AM    K 3.4 (L) 02/05/2018 02:41 AM     02/05/2018 02:41 AM    CO2 25 02/05/2018 02:41 AM    AGAP 12 02/05/2018 02:41 AM     (H) 02/05/2018 02:41 AM    BUN 6 (L) 02/05/2018 02:41 AM    CREA 0.68 02/05/2018 02:41 AM    GFRAA >60 02/05/2018 02:41 AM    GFRNA >60 02/05/2018 02:41 AM     CMP:   Lab Results   Component Value Date/Time     02/05/2018 02:41 AM    K 3.4 (L) 02/05/2018 02:41 AM     02/05/2018 02:41 AM    CO2 25 02/05/2018 02:41 AM    AGAP 12 02/05/2018 02:41 AM     (H) 02/05/2018 02:41 AM    BUN 6 (L) 02/05/2018 02:41 AM    CREA 0.68 02/05/2018 02:41 AM    GFRAA >60 02/05/2018 02:41 AM    GFRNA >60 02/05/2018 02:41 AM    CA 8.4 (L) 02/05/2018 02:41 AM    ALB 3.5 02/05/2018 02:41 AM    TP 6.7 02/05/2018 02:41 AM    GLOB 3.2 02/05/2018 02:41 AM    AGRAT 1.1 02/05/2018 02:41 AM    SGOT 34 02/05/2018 02:41 AM    ALT 40 02/05/2018 02:41 AM     CBC:   Lab Results   Component Value Date/Time    WBC 26.3 (H) 02/05/2018 02:41 AM    HGB 11.8 (L) 02/05/2018 02:41 AM    HCT 35.9 02/05/2018 02:41 AM     02/05/2018 02:41 AM     All Cardiac Markers in the last 24 hours: No results found for: CPK, CK, CKMMB, CKMB, RCK3, CKMBT, CKNDX, CKND1, NAEEM, TROPT, TROIQ, HARRY, TROPT, TNIPOC, BNP, BNPP  Recent Glucose Results:   Lab Results   Component Value Date/Time     (H) 02/05/2018 02:41 AM     ABG: No results found for: PH, PHI, PCO2, PCO2I, PO2, PO2I, HCO3, HCO3I, FIO2, FIO2I  COAGS: No results found for: APTT, PTP, INR  Liver Panel:   Lab Results   Component Value Date/Time    ALB 3.5 02/05/2018 02:41 AM    TP 6.7 02/05/2018 02:41 AM    GLOB 3.2 02/05/2018 02:41 AM    AGRAT 1.1 02/05/2018 02:41 AM    SGOT 34 02/05/2018 02:41 AM    ALT 40 02/05/2018 02:41 AM    AP 56 02/05/2018 02:41 AM     Pancreatic Markers: No results found for: AMYLPOCT, AML, LIPPOCT, LPSE    Imaging studies personally reviewed:  Impression:  No radiographic evidence of an acute abnormality        Mat Banasl MD, Internal Medicine

## 2018-02-05 NOTE — PROGRESS NOTES
Pt exited room at this time and is in hallway crying and screaming \"I need help. Why is no one helping me?\". Assisted patient back to room. Pt reports uncontrolled left sided flank pain. Pt very agitated and anxious thrashing around in bed. Able to talk to patient and calm her down. Pt reports that she is nausous at this time and is currently requesting lunch box. Lunch box provided- encouraged pt to wait until her dose of Phenergan is administered. Pt keeps stating that she doesn't know what to do about the pain. Attempted to shift patient's focus away from pain. Pt calm in bed. Call light within reach. Pt calm in room for about 10 minutes and is screaming in pain again.

## 2018-02-05 NOTE — PROGRESS NOTES
1931 Pt grunting in room, thrashing in bed. Pt states pain in left flank, sharp and stabbing. Vitals taken; HR elevated. Attempted to have pt control breathing, offered heating pad or ice. Refused tylenol. 2155 Phenergan 25mg given. Pt vomiting brown clear emesis. 2207 Pt begging \"what do I do? \" exclaiming that left side hurts. Continues to thrash and is inconsolable. She accepted tylenol. 1 Informed Dr. Tad Cottrell, on call for Dr. Arcadio Hagen, about pt's continued pain and panic, described pain for being stabbing on left flank. Also informed him that Dr. Arcadio Hagen did not want to order medication stronger than tylenol for pt being 6 weeks pregnant. Order received for percocet 1-2 tabs, and first dose can be given now, even if tylenol was just given at 2200.     2241 Returned to room with percocet. Pt is calm and resting now. 2303 Pt awake again. Thrashing in bed. Gave percocet. 2315 Pt stressed out. Ambulated with her in the hallway. She continues to groan while walking, though she notices staff members and other patients' visitors staring at her. While returning to bed, she states \"You see how I am. This baby is causing extreme pain - and I don't know if I'm going to keep it!\"     2330 Calmer after ambulation. However, became anxious again after moving in bed. Vitals while calm:  Visit Vitals    /66 (BP 1 Location: Left arm, BP Patient Position: At rest)    Pulse 97    Temp 98 °F (36.7 °C)    Resp 15    Ht 4' 11\" (1.499 m)    Wt 49.9 kg (109 lb 14.4 oz)    SpO2 100%    BMI 22.2 kg/m2        SHIFT SUMMARY: She is difficult to talk to because she grunts and screams while awake and in pain. However, in the presence of her boyfriend, she is much more calm. If a staff member can spend a long amount of time with her she may be consolable. Continued to vomit brown clear emesis. Drinks ginger ale. She feels and abd pain this AM, described like a \"hole in her stomach. \" She has been informed that medication stronger tylenol would pose threat to her child, however, she seems to disregard the child. Zofran, phenergan, and percocet given as they were available.

## 2018-02-05 NOTE — PROGRESS NOTES
1845 - Verbal and Written shift change report given to France Marie RN (oncoming nurse) by Obi Hawk RN (offgoing nurse). Report included the following information SBAR, Kardex, ED Summary, Intake/Output, MAR and Recent Results. Pt very agitated upon arrival to unit. Calms down with coaching, however requires frequent attention. Dr. Archuleta Breeding aware. IVF started and after-hours meal tray requested from Nursing Supervisor. Patient Vitals for the past 12 hrs:   Temp Pulse Resp BP SpO2   02/04/18 1931 98.4 °F (36.9 °C) (!) 121 18 129/72 100 %   02/04/18 1845 97.8 °F (36.6 °C) 86 18 104/58 100 %   02/04/18 1752 99.4 °F (37.4 °C) 73 19 137/85 99 %   02/04/18 1500 - - - 136/78 99 %   02/04/18 1459 97.6 °F (36.4 °C) - 20 134/80 100 %   02/04/18 1232 96.9 °F (36.1 °C) - - - -   02/04/18 1217 - 77 18 (!) 147/104 100 %     1915 - Bedside shift change report given to Alycia Oreilly RN (oncoming nurse) by France Marie RN (offgoing nurse). Report included the following information SBAR, Kardex, ED Summary, Intake/Output, MAR and Recent Results.

## 2018-02-05 NOTE — PROGRESS NOTES
Readmission Risk Assessment: Low Risk and [facility] patients    RRAT Score: 1 - 12    Initial Assessment:  Chart reviewed, pt admitted for intractable pain and n/v by OB/GYN service. Pt on previous and this admission UDS positive for opiates and this admission positive for cocaine. CM on recent admission contacted CPS, no open cases, pt parents have custody of her child. Pt given resources for community programs to assist with pregnancy and follow up if needed, pt was screened by [facility] as well, will be followed up by [facility] this admission. No immediate needs identified, CM remains available. Noted consult for community needs- CM will provide again at discharge information on community resources. Pt would benefit from having her scripts filled for abx through [facility] to ensure she has those at discharge, as pt did not get abx script filled at prior discharge. Emergency Contact: see face sheet    Pertinent Medical Hx:      PCP/Specialists:     Community Services:     DME:     Low Risk Care Transition Plan:  1. Evaluate for [facility] or [address] coordination of resources  2. Involve patient/caregiver in assessment, planning, education and implement of intervention. 3. CM daily patient care huddles/interdisciplinary rounds. 4. PCP/Specialist appointment within 7 - 10 days made prior to discharge. 5. Facilitate transportation and logistics for follow-up appointments. 6. Handoff to [address] Nurse Navigator or PCP practice.

## 2018-02-05 NOTE — PROGRESS NOTES
Problem: Falls - Risk of  Goal: *Absence of Falls  Document Lian Fall Risk and appropriate interventions in the flowsheet.    Outcome: Progressing Towards Goal  Fall Risk Interventions:            Medication Interventions: Patient to call before getting OOB, Teach patient to arise slowly, Evaluate medications/consider consulting pharmacy

## 2018-02-06 VITALS
RESPIRATION RATE: 16 BRPM | OXYGEN SATURATION: 100 % | DIASTOLIC BLOOD PRESSURE: 60 MMHG | WEIGHT: 109.9 LBS | HEIGHT: 59 IN | SYSTOLIC BLOOD PRESSURE: 93 MMHG | TEMPERATURE: 98.1 F | HEART RATE: 81 BPM | BODY MASS INDEX: 22.16 KG/M2

## 2018-02-06 LAB
ANION GAP SERPL CALC-SCNC: 10 MMOL/L (ref 3–18)
BASOPHILS # BLD: 0 K/UL (ref 0–0.06)
BASOPHILS NFR BLD: 0 % (ref 0–2)
BUN SERPL-MCNC: 3 MG/DL (ref 7–18)
BUN/CREAT SERPL: 5 (ref 12–20)
CALCIUM SERPL-MCNC: 7.8 MG/DL (ref 8.5–10.1)
CHLORIDE SERPL-SCNC: 99 MMOL/L (ref 100–108)
CO2 SERPL-SCNC: 23 MMOL/L (ref 21–32)
CREAT SERPL-MCNC: 0.58 MG/DL (ref 0.6–1.3)
DIFFERENTIAL METHOD BLD: ABNORMAL
EOSINOPHIL # BLD: 0.1 K/UL (ref 0–0.4)
EOSINOPHIL NFR BLD: 1 % (ref 0–5)
ERYTHROCYTE [DISTWIDTH] IN BLOOD BY AUTOMATED COUNT: 15.6 % (ref 11.6–14.5)
GLUCOSE SERPL-MCNC: 97 MG/DL (ref 74–99)
HCT VFR BLD AUTO: 30.8 % (ref 35–45)
HGB BLD-MCNC: 10.2 G/DL (ref 12–16)
LYMPHOCYTES # BLD: 3.8 K/UL (ref 0.9–3.6)
LYMPHOCYTES NFR BLD: 33 % (ref 21–52)
MAGNESIUM SERPL-MCNC: 1.6 MG/DL (ref 1.6–2.6)
MAGNESIUM SERPL-MCNC: 2.4 MG/DL (ref 1.6–2.6)
MCH RBC QN AUTO: 28 PG (ref 24–34)
MCHC RBC AUTO-ENTMCNC: 33.1 G/DL (ref 31–37)
MCV RBC AUTO: 84.6 FL (ref 74–97)
MONOCYTES # BLD: 1 K/UL (ref 0.05–1.2)
MONOCYTES NFR BLD: 8 % (ref 3–10)
NEUTS SEG # BLD: 6.7 K/UL (ref 1.8–8)
NEUTS SEG NFR BLD: 58 % (ref 40–73)
PLATELET # BLD AUTO: 279 K/UL (ref 135–420)
PMV BLD AUTO: 10.5 FL (ref 9.2–11.8)
POTASSIUM SERPL-SCNC: 2.6 MMOL/L (ref 3.5–5.5)
POTASSIUM SERPL-SCNC: 4.9 MMOL/L (ref 3.5–5.5)
RBC # BLD AUTO: 3.64 M/UL (ref 4.2–5.3)
SODIUM SERPL-SCNC: 132 MMOL/L (ref 136–145)
WBC # BLD AUTO: 11.7 K/UL (ref 4.6–13.2)

## 2018-02-06 PROCEDURE — 74011250636 HC RX REV CODE- 250/636: Performed by: HOSPITALIST

## 2018-02-06 PROCEDURE — 83735 ASSAY OF MAGNESIUM: CPT | Performed by: HOSPITALIST

## 2018-02-06 PROCEDURE — 85025 COMPLETE CBC W/AUTO DIFF WBC: CPT | Performed by: HOSPITALIST

## 2018-02-06 PROCEDURE — 74011000250 HC RX REV CODE- 250: Performed by: OBSTETRICS & GYNECOLOGY

## 2018-02-06 PROCEDURE — 74011250637 HC RX REV CODE- 250/637: Performed by: INTERNAL MEDICINE

## 2018-02-06 PROCEDURE — 83735 ASSAY OF MAGNESIUM: CPT | Performed by: INTERNAL MEDICINE

## 2018-02-06 PROCEDURE — 74011250636 HC RX REV CODE- 250/636: Performed by: OBSTETRICS & GYNECOLOGY

## 2018-02-06 PROCEDURE — 74011250637 HC RX REV CODE- 250/637: Performed by: HOSPITALIST

## 2018-02-06 PROCEDURE — 84132 ASSAY OF SERUM POTASSIUM: CPT | Performed by: HOSPITALIST

## 2018-02-06 PROCEDURE — 74011250637 HC RX REV CODE- 250/637: Performed by: OBSTETRICS & GYNECOLOGY

## 2018-02-06 PROCEDURE — 36415 COLL VENOUS BLD VENIPUNCTURE: CPT | Performed by: HOSPITALIST

## 2018-02-06 RX ORDER — POTASSIUM CHLORIDE 20 MEQ/1
40 TABLET, EXTENDED RELEASE ORAL
Status: DISCONTINUED | OUTPATIENT
Start: 2018-02-06 | End: 2018-02-06

## 2018-02-06 RX ORDER — POTASSIUM CHLORIDE 20 MEQ/1
40 TABLET, EXTENDED RELEASE ORAL
Status: COMPLETED | OUTPATIENT
Start: 2018-02-06 | End: 2018-02-06

## 2018-02-06 RX ORDER — PROMETHAZINE HYDROCHLORIDE 25 MG/1
25 TABLET ORAL
Qty: 40 TAB | Refills: 1 | Status: SHIPPED | OUTPATIENT
Start: 2018-02-06 | End: 2018-03-11

## 2018-02-06 RX ORDER — POTASSIUM CHLORIDE 20 MEQ/1
40 TABLET, EXTENDED RELEASE ORAL ONCE
Status: COMPLETED | OUTPATIENT
Start: 2018-02-06 | End: 2018-02-06

## 2018-02-06 RX ORDER — MAGNESIUM SULFATE HEPTAHYDRATE 40 MG/ML
2 INJECTION, SOLUTION INTRAVENOUS ONCE
Status: COMPLETED | OUTPATIENT
Start: 2018-02-06 | End: 2018-02-06

## 2018-02-06 RX ORDER — SODIUM CHLORIDE AND POTASSIUM CHLORIDE .9; .15 G/100ML; G/100ML
SOLUTION INTRAVENOUS CONTINUOUS
Status: DISCONTINUED | OUTPATIENT
Start: 2018-02-06 | End: 2018-02-06 | Stop reason: HOSPADM

## 2018-02-06 RX ORDER — POTASSIUM CHLORIDE 20 MEQ/1
40 TABLET, EXTENDED RELEASE ORAL ONCE
Status: DISCONTINUED | OUTPATIENT
Start: 2018-02-06 | End: 2018-02-06 | Stop reason: HOSPADM

## 2018-02-06 RX ORDER — PROMETHAZINE HYDROCHLORIDE 25 MG/1
25 SUPPOSITORY RECTAL
Qty: 20 SUPPOSITORY | Refills: 0 | Status: SHIPPED | OUTPATIENT
Start: 2018-02-06 | End: 2018-02-13

## 2018-02-06 RX ADMIN — LORAZEPAM 0.5 MG: 2 INJECTION INTRAMUSCULAR; INTRAVENOUS at 05:08

## 2018-02-06 RX ADMIN — ONDANSETRON 4 MG: 2 INJECTION INTRAMUSCULAR; INTRAVENOUS at 13:29

## 2018-02-06 RX ADMIN — DIPHENHYDRAMINE HYDROCHLORIDE 12.5 MG: 50 INJECTION, SOLUTION INTRAMUSCULAR; INTRAVENOUS at 06:13

## 2018-02-06 RX ADMIN — LORAZEPAM 0.5 MG: 2 INJECTION INTRAMUSCULAR; INTRAVENOUS at 13:13

## 2018-02-06 RX ADMIN — PIPERACILLIN SODIUM AND TAZOBACTAM SODIUM 3.38 G: .375; 3 INJECTION, POWDER, LYOPHILIZED, FOR SOLUTION INTRAVENOUS at 05:09

## 2018-02-06 RX ADMIN — POTASSIUM CHLORIDE 40 MEQ: 20 TABLET, EXTENDED RELEASE ORAL at 06:13

## 2018-02-06 RX ADMIN — Medication 10 ML: at 14:00

## 2018-02-06 RX ADMIN — MAGNESIUM SULFATE HEPTAHYDRATE 2 G: 40 INJECTION, SOLUTION INTRAVENOUS at 12:36

## 2018-02-06 RX ADMIN — POTASSIUM CHLORIDE 40 MEQ: 20 TABLET, EXTENDED RELEASE ORAL at 14:20

## 2018-02-06 RX ADMIN — DIPHENHYDRAMINE HYDROCHLORIDE 12.5 MG: 50 INJECTION, SOLUTION INTRAMUSCULAR; INTRAVENOUS at 12:36

## 2018-02-06 RX ADMIN — POTASSIUM CHLORIDE 40 MEQ: 20 TABLET, EXTENDED RELEASE ORAL at 12:36

## 2018-02-06 RX ADMIN — SODIUM CHLORIDE 25 MG: 9 INJECTION INTRAMUSCULAR; INTRAVENOUS; SUBCUTANEOUS at 06:23

## 2018-02-06 RX ADMIN — OXYCODONE HYDROCHLORIDE AND ACETAMINOPHEN 1 TABLET: 5; 325 TABLET ORAL at 06:58

## 2018-02-06 RX ADMIN — TAZOBACTAM SODIUM AND PIPERACILLIN SODIUM 3.38 G: 375; 3 INJECTION, SOLUTION INTRAVENOUS at 14:29

## 2018-02-06 RX ADMIN — OXYCODONE HYDROCHLORIDE AND ACETAMINOPHEN 1 TABLET: 5; 325 TABLET ORAL at 14:22

## 2018-02-06 RX ADMIN — OXYCODONE HYDROCHLORIDE AND ACETAMINOPHEN 1 TABLET: 5; 325 TABLET ORAL at 01:46

## 2018-02-06 NOTE — PROGRESS NOTES
21:20  Assessment completed. Lungs are clear bilat. Resting quietly in bed with visitor @ bedside watching TV. Voiding per BR w/o difficulty. 23:00 Shift assessment completed. See nsg flow sheet for details. 03:05 Reassessed with 0 changes noted. Resting quietly in bed with eyes closed between cares x for voiding per BR w/o difficulty. 07:50 Bedside and Verbal shift change report given to HÉCTOR Bell RN (oncoming nurse) by Juan Luis Soto RN (offgoing nurse). Report included the following information SBAR.

## 2018-02-06 NOTE — PROGRESS NOTES
OBGYN Progress Note    Zonia Alcazar    Assesment:   Patient Active Problem List   Diagnosis Code    Substance abuse F19.10    Seizure ([facility] [facility] R56.9    Intractable nausea and vomiting R11.2    Acute pyelonephritis N10    Anxiety F41.9    Dehydration E86.0    Hypokalemia E87.6    Acute renal injury ([facility] N17.9    Pyelonephritis affecting pregnancy in first trimester O23.01    5 weeks gestation of pregnancy Z3A.01    Dysthymic disorder F34.1    Personality disorder F60.9    Leukocytosis D72.829       Plan: Discharge home today with: Medications: Phenergan Follow up: 1-2 weeks with her OBGYN provider Diet: BRAT diet Activity: as tolerated    She with no new complaints and reports no emesis this AM. Pain controlled on current medication. Voiding without difficulty. Patient informed of plan for discharge once her hyopkalemia is corrected. Patient will need to have close f/u care with her new OBGYN provider. Patient made aware that she may need continued assistance if her HEG symptoms persist.  Patient denies any VB or cramping at this time. Orders/Charges: Low    Current Facility-Administered Medications   Medication Dose Route Frequency    potassium chloride (K-DUR, KLOR-CON) SR tablet 40 mEq  40 mEq Oral ONCE    potassium chloride (K-DUR, KLOR-CON) SR tablet 40 mEq  40 mEq Oral ONCE    magnesium sulfate 2 g/50 ml IVPB (premix or compounded)  2 g IntraVENous ONCE    0.9% sodium chloride with KCl 20 mEq/L infusion   IntraVENous CONTINUOUS    piperacillin-tazobactam (ZOSYN) 3.375 g IVPB (premix)  3.375 g IntraVENous Q6H    potassium chloride (K-DUR, KLOR-CON) SR tablet 40 mEq  40 mEq Oral NOW    sodium chloride (NS) flush 5-10 mL  5-10 mL IntraVENous Q8H    0.9% sodium chloride 1,000 mL with mvi, adult no. 4 with vit K 10 mL, thiamine 629 mg, folic acid 1 mg, potassium chloride 40 mEq infusion   IntraVENous Q24H    nicotine (NICODERM CQ) 21 mg/24 hr patch 1 Patch  1 Patch TransDERmal DAILY Vitals:  Visit Vitals    BP 93/60 (BP 1 Location: Left arm, BP Patient Position: At rest)    Pulse 81    Temp 98.1 °F (36.7 °C)    Resp 16    Ht 4' 11\" (1.499 m)    Wt 49.9 kg (109 lb 14.4 oz)    LMP  (LMP Unknown)    SpO2 100%    BMI 22.2 kg/m2     Temp (24hrs), Av.9 °F (36.6 °C), Min:97.8 °F (36.6 °C), Max:98.1 °F (36.7 °C)      Last 24hr Input/Output:    Intake/Output Summary (Last 24 hours) at 18 1157  Last data filed at 18 0645   Gross per 24 hour   Intake             1800 ml   Output              100 ml   Net             1700 ml          Exam:  General: alert, cooperative, no distress, appears stated age     Lung: clear to auscultation bilaterally     Heart: regular rate and rhythm, S1, S2 normal, no murmur, click, rub or gallop     Abdomen: abdomen is soft without significant tenderness, masses, organomegaly or guarding     Extremities: extremities normal, atraumatic, no cyanosis or edema      Labs: Lab Results   Component Value Date/Time    WBC 11.7 2018 04:40 AM    WBC 26.3 2018 02:41 AM    WBC 20.1 2018 12:32 PM    HGB 10.2 2018 04:40 AM    HGB 11.8 2018 02:41 AM    HGB 13.7 2018 12:32 PM    HCT 30.8 2018 04:40 AM    HCT 35.9 2018 02:41 AM    HCT 40.6 2018 12:32 PM    PLATELET 745 35/15/2965 04:40 AM    PLATELET 549  02:41 AM    PLATELET 952  12:32 PM

## 2018-02-06 NOTE — PROGRESS NOTES
Hospitalist Progress Note-critical care note     Patient: Robbie Lindsay MRN: [id number]  CSN: [id number]    YOB: 1985  Age: 29 y.o. Sex: female    DOA: 2/4/2018 LOS:  LOS: 2 days            Chief complaint: leokocytosis, hypokalemia. Assessment/Plan         Hospital Problems  Date Reviewed: 4/22/2015          Codes Class Noted POA    Leukocytosis ICD-10-CM: I67.116  ICD-9-CM: 288.60  2/5/2018 Unknown        Personality disorder ICD-10-CM: F60.9  ICD-9-CM: 301.9  1/20/2018 Yes    Overview Signed 1/20/2018  3:55 PM by Frandy Jackson MD     (borderline and antisocial personality disorder). Dehydration ICD-10-CM: E86.0  ICD-9-CM: 276.51  1/18/2018 Yes        Hypokalemia ICD-10-CM: E87.6  ICD-9-CM: 276.8  1/18/2018 Yes        Intractable nausea and vomiting ICD-10-CM: R11.2  ICD-9-CM: 536.2  4/21/2015 Unknown        Substance abuse ICD-10-CM: F19.10  ICD-9-CM: 305.90  2/21/2015 Yes            1. Leukocytosis   Resolved, due to hydration   renal ultrasound wnl   ua small leukocyte esterase with wbc 0-2-do not think is UTI    2 Hypokalemia   k replacement, and mg ,   Change the fluid to ns with K      3 hyperemesis  Tolerated pills well   Monitor electrolytes      4 personal  Disorder  Need f/u with cbs      5 substance abuse   Cocaine /opiate positive      Subjective:  Don't you see the food on the tray? Nurse: K was low    Pt tolerated pills well and dehydration was corrected, if K got improving after replacement, she can be d/c today   Disposition : today per primary team     Case discussed with Dr. Desiree Herman.     Review of systems:    General: No fevers or chills. Cardiovascular: No chest pain or pressure. No palpitations. Pulmonary: No shortness of breath. Gastrointestinal: No nausea, vomiting.      Vital signs/Intake and Output:  Visit Vitals    BP 93/60 (BP 1 Location: Left arm, BP Patient Position: At rest)    Pulse 81    Temp 98.1 °F (36.7 °C)    Resp 16    Ht 4' 11\" (1.499 m)    Wt 49.9 kg (109 lb 14.4 oz)    SpO2 100%    BMI 22.2 kg/m2     Current Shift:  02/06 0701 - 02/06 1900  In: 120 [P.O.:120]  Out: 500 [Urine:500]  Last three shifts:  02/04 1901 - 02/06 0700  In: 2976 [I.V.:2976]  Out: 700 [Urine:100]    Physical Exam:  General: WD, WN. Alert, cooperative, no acute distress    HEENT: NC, Atraumatic. PERRLA, anicteric sclerae. Lungs: CTA Bilaterally. No Wheezing/Rhonchi/Rales. Heart:  Regular  rhythm,  No murmur, No Rubs, No Gallops  Abdomen: Soft, Non distended, Non tender.  +Bowel sounds,   Extremities: No c/c/e  Psych:   Not anxious or agitated. Neurologic:  No acute neurological deficit. Labs: Results:       Chemistry Recent Labs      02/06/18 0440 02/05/18 0241 02/04/18   1232   GLU  97  123*  121*   NA  132*  142  141   K  2.6*  3.4*  4.2   CL  99*  105  103   CO2  23  25  24   BUN  3*  6*  12   CREA  0.58*  0.68  0.78   CA  7.8*  8.4*  9.3   AGAP  10  12  14   BUCR  5*  9*  15   AP   --   56  64   TP   --   6.7  7.9   ALB   --   3.5  4.2   GLOB   --   3.2  3.7   AGRAT   --   1.1  1.1      CBC w/Diff Recent Labs      02/06/18 0440 02/05/18   0241 02/04/18   1232   WBC  11.7  26.3*  20.1*   RBC  3.64*  4.25  4.82   HGB  10.2*  11.8*  13.7   HCT  30.8*  35.9  40.6   PLT  279  337  383   GRANS  58   --   73   LYMPH  33   --   20*   EOS  1   --   1      Cardiac Enzymes No results for input(s): CPK, CKND1, NAEEM in the last 72 hours. No lab exists for component: CKRMB, TROIP   Coagulation No results for input(s): PTP, INR, APTT in the last 72 hours. No lab exists for component: INREXT    Lipid Panel No results found for: CHOL, CHOLPOCT, CHOLX, CHLST, CHOLV, 633640, HDL, LDL, LDLC, DLDLP, 210829, VLDLC, VLDL, TGLX, TRIGL, TRIGP, TGLPOCT, CHHD, CHHDX   BNP No results for input(s): BNPP in the last 72 hours.    Liver Enzymes Recent Labs      02/05/18   0241   TP  6.7   ALB  3.5   AP  56   SGOT  34      Thyroid Studies No results found for: T4, T3U, TSH, TSHEXT     Procedures/imaging: see electronic medical records for all procedures/Xrays and details which were not copied into this note but were reviewed prior to creation of Pk Salmon MD

## 2018-02-06 NOTE — PROGRESS NOTES
Problem: Falls - Risk of  Goal: *Absence of Falls  Document Lian Fall Risk and appropriate interventions in the flowsheet.    Outcome: Progressing Towards Goal  Fall Risk Interventions:            Medication Interventions: Assess postural VS orthostatic hypotension, Patient to call before getting OOB, Teach patient to arise slowly    Elimination Interventions: Patient to call for help with toileting needs

## 2018-02-06 NOTE — DISCHARGE INSTRUCTIONS
Abdominal Pain: Care Instructions  Your Care Instructions    Abdominal pain has many possible causes. Some aren't serious and get better on their own in a few days. Others need more testing and treatment. If your pain continues or gets worse, you need to be rechecked and may need more tests to find out what is wrong. You may need surgery to correct the problem. Don't ignore new symptoms, such as fever, nausea and vomiting, urination problems, pain that gets worse, and dizziness. These may be signs of a more serious problem. Your doctor may have recommended a follow-up visit in the next 8 to 12 hours. If you are not getting better, you may need more tests or treatment. The doctor has checked you carefully, but problems can develop later. If you notice any problems or new symptoms, get medical treatment right away. Follow-up care is a key part of your treatment and safety. Be sure to make and go to all appointments, and call your doctor if you are having problems. It's also a good idea to know your test results and keep a list of the medicines you take. How can you care for yourself at home? · Rest until you feel better. · To prevent dehydration, drink plenty of fluids, enough so that your urine is light yellow or clear like water. Choose water and other caffeine-free clear liquids until you feel better. If you have kidney, heart, or liver disease and have to limit fluids, talk with your doctor before you increase the amount of fluids you drink. · If your stomach is upset, eat mild foods, such as rice, dry toast or crackers, bananas, and applesauce. Try eating several small meals instead of two or three large ones. · Wait until 48 hours after all symptoms have gone away before you have spicy foods, alcohol, and drinks that contain caffeine. · Do not eat foods that are high in fat. · Avoid anti-inflammatory medicines such as aspirin, ibuprofen (Advil, Motrin), and naproxen (Aleve).  These can cause stomach upset. Talk to your doctor if you take daily aspirin for another health problem. When should you call for help? Call 911 anytime you think you may need emergency care. For example, call if:  ? · You passed out (lost consciousness). ? · You pass maroon or very bloody stools. ? · You vomit blood or what looks like coffee grounds. ? · You have new, severe belly pain. ?Call your doctor now or seek immediate medical care if:  ? · Your pain gets worse, especially if it becomes focused in one area of your belly. ? · You have a new or higher fever. ? · Your stools are black and look like tar, or they have streaks of blood. ? · You have unexpected vaginal bleeding. ? · You have symptoms of a urinary tract infection. These may include:  ¨ Pain when you urinate. ¨ Urinating more often than usual.  ¨ Blood in your urine. ? · You are dizzy or lightheaded, or you feel like you may faint. ? Watch closely for changes in your health, and be sure to contact your doctor if:  ? · You are not getting better after 1 day (24 hours). Where can you learn more? Go to http://shrutiVAIREX internationalpilo.info/. Enter H306 in the search box to learn more about \"Abdominal Pain: Care Instructions. \"  Current as of: March 20, 2017  Content Version: 11.4  © 6367-4763 [employer]. Care instructions adapted under license by [employer] (which disclaims liability or warranty for this information). If you have questions about a medical condition or this instruction, always ask your healthcare professional. [place] [address] any warranty or liability for your use of this information. Learning About Anxiety Disorders  What are anxiety disorders? Anxiety disorders are a type of medical problem. They cause severe anxiety. When you feel anxious, you feel that something bad is about to happen. This feeling interferes with your life.   These disorders include:  · Generalized anxiety disorder. You feel worried and stressed about many everyday events and activities. This goes on for several months and disrupts your life on most days. · Panic disorder. You have repeated panic attacks. A panic attack is a sudden, intense fear or anxiety. It may make you feel short of breath. Your heart may pound. · Social anxiety disorder. You feel very anxious about what you will say or do in front of people. For example, you may be scared to talk or eat in public. This problem affects your daily life. · Phobias. You are very scared of a specific object, situation, or activity. For example, you may fear spiders, high places, or small spaces. What are the symptoms? Generalized anxiety disorder  Symptoms may include:  · Feeling worried and stressed about many things almost every day. · Feeling tired or irritable. You may have a hard time concentrating. · Having headaches or muscle aches. · Having a hard time getting to sleep or staying asleep. Panic disorder  You may have repeated panic attacks when there is no reason for feeling afraid. You may change your daily activities because you worry that you will have another attack. Symptoms may include:  · Intense fear, terror, or anxiety. · Trouble breathing or very fast breathing. · Chest pain or tightness. · A heartbeat that races or is not regular. Social anxiety disorder  Symptoms may include:  · Fear about a social situation, such as eating in front of others or speaking in public. You may worry a lot. Or you may be afraid that something bad will happen. · Anxiety that can cause you to blush, sweat, and feel shaky. · A heartbeat that is faster than normal.  · A hard time focusing. Phobias  Symptoms may include:  · More fear than most people of being around an object, being in a situation, or doing an activity. You might also be stressed about the chance of being around the thing you fear.   · Worry about losing control, panicking, fainting, or having physical symptoms like a faster heartbeat when you are around the situation or object. How are these disorders treated? Anxiety disorders can be treated with medicines or counseling. A combination of both may be used. Medicines may include:  · Antidepressants. These may help your symptoms by keeping chemicals in your brain in balance. · Benzodiazepines. These may give you short-term relief of your symptoms. Some people use cognitive-behavioral therapy. A therapist helps you learn to change stressful or bad thoughts into helpful thoughts. Lead a healthy lifestyle  A healthy lifestyle may help you feel better. · Get at least 30 minutes of exercise on most days of the week. Walking is a good choice. · Eat a healthy diet. Include fruits, vegetables, lean proteins, and whole grains in your diet each day. · Try to go to bed at the same time every night. Try for 8 hours of sleep a night. · Find ways to manage stress. Try relaxation exercises. · Avoid alcohol and illegal drugs. Follow-up care is a key part of your treatment and safety. Be sure to make and go to all appointments, and call your doctor if you are having problems. It's also a good idea to know your test results and keep a list of the medicines you take. Where can you learn more? Go to http://shruti-pilo.info/. Enter Q503 in the search box to learn more about \"Learning About Anxiety Disorders. \"  Current as of: May 12, 2017  Content Version: 11.4  © 7212-7122 [employer]. Care instructions adapted under license by [employer] (which disclaims liability or warranty for this information). If you have questions about a medical condition or this instruction, always ask your healthcare professional. [address] any warranty or liability for your use of this information.          Abdominal Pain in Children: Care Instructions  Your Care Instructions    Abdominal pain has many possible causes. Some are not serious and get better on their own in a few days. Others need more testing and treatment. If your child's belly pain continues or gets worse, he or she may need more tests to find out what is wrong. Most cases of abdominal pain in children are caused by minor problems, such as stomach flu or constipation. Home treatment often is all that is needed to relieve them. Your doctor may have recommended a follow-up visit in the next 8 to 12 hours. Do not ignore new symptoms, such as fever, nausea and vomiting, urination problems, or pain that gets worse. These may be signs of a more serious problem. The doctor has checked your child carefully, but problems can develop later. If you notice any problems or new symptoms, get medical treatment right away. Follow-up care is a key part of your child's treatment and safety. Be sure to make and go to all appointments, and call your doctor if your child is having problems. It's also a good idea to know your child's test results and keep a list of the medicines your child takes. How can you care for your child at home? · Your child should rest until he or she feels better. · Give your child lots of fluids, enough so that the urine is light yellow or clear like water. This is very important if your child is vomiting or has diarrhea. Give your child sips of water or drinks such as Pedialyte or Infalyte. These drinks contain a mix of salt, sugar, and minerals. You can buy them at drugstores or grocery stores. Give these drinks as long as your child is throwing up or has diarrhea. Do not use them as the only source of liquids or food for more than 12 to 24 hours. · Feed your child mild foods, such as rice, dry toast or crackers, bananas, and applesauce. Try feeding your child several small meals instead of 2 or 3 large ones.   · Do not give your child spicy foods, fruits other than bananas or applesauce, or drinks that contain caffeine until 48 hours after all your child's symptoms have gone away. · Do not feed your child foods that are high in fat. · Have your child take medicines exactly as directed. Call your doctor if you think your child is having a problem with his or her medicine. · Do not give your child aspirin, ibuprofen (Advil, Motrin), or naproxen (Aleve). These can cause stomach upset. When should you call for help? Call 911 anytime you think your child may need emergency care. For example, call if:  ? · Your child passes out (loses consciousness). ? · Your child vomits blood or what looks like coffee grounds. ? · Your child's stools are maroon or very bloody. ?Call your doctor now or seek immediate medical care if:  ? · Your child has new belly pain or his or her pain gets worse. ? · Your child's pain becomes focused in one area of his or her belly. ? · Your child has a new or higher fever. ? · Your child's stools are black and look like tar or have streaks of blood. ? · Your child has new or worse diarrhea or vomiting. ? · Your child has symptoms of a urinary tract infection. These may include:  ¨ Pain when he or she urinates. ¨ Urinating more often than usual.  ¨ Blood in his or her urine. ? Watch closely for changes in your child's health, and be sure to contact your doctor if:  ? · Your child does not get better as expected. Where can you learn more? Go to http://shruti-pilo.info/. Enter 0681 [id number] in the search box to learn more about \"Abdominal Pain in Children: Care Instructions. \"  Current as of: March 20, 2017  Content Version: 11.4  © 8192-2548 [employer]. Care instructions adapted under license by [employer] (which disclaims liability or warranty for this information).  If you have questions about a medical condition or this instruction, always ask your healthcare professional. [employer] disclaims any warranty or liability for your use of this information. Anxiety Disorder: Care Instructions  Your Care Instructions    Anxiety is a normal reaction to stress. Difficult situations can cause you to have symptoms such as sweaty palms and a nervous feeling. In an anxiety disorder, the symptoms are far more severe. Constant worry, muscle tension, trouble sleeping, nausea and diarrhea, and other symptoms can make normal daily activities difficult or impossible. These symptoms may occur for no reason, and they can affect your work, school, or social life. Medicines, counseling, and self-care can all help. Follow-up care is a key part of your treatment and safety. Be sure to make and go to all appointments, and call your doctor if you are having problems. It's also a good idea to know your test results and keep a list of the medicines you take. How can you care for yourself at home? · Take medicines exactly as directed. Call your doctor if you think you are having a problem with your medicine. · Go to your counseling sessions and follow-up appointments. · Recognize and accept your anxiety. Then, when you are in a situation that makes you anxious, say to yourself, \"This is not an emergency. I feel uncomfortable, but I am not in danger. I can keep going even if I feel anxious. \"  · Be kind to your body:  ¨ Relieve tension with exercise or a massage. ¨ Get enough rest.  ¨ Avoid alcohol, caffeine, nicotine, and illegal drugs. They can increase your anxiety level and cause sleep problems. ¨ Learn and do relaxation techniques. See below for more about these techniques. · Engage your mind. Get out and do something you enjoy. Go to a funny movie, or take a walk or hike. Plan your day. Having too much or too little to do can make you anxious. · Keep a record of your symptoms. Discuss your fears with a good friend or family member, or join a support group for people with similar problems.  Talking to others sometimes relieves stress. · Get involved in social groups, or volunteer to help others. Being alone sometimes makes things seem worse than they are. · Get at least 30 minutes of exercise on most days of the week to relieve stress. Walking is a good choice. You also may want to do other activities, such as running, swimming, cycling, or playing tennis or team sports. Relaxation techniques  Do relaxation exercises 10 to 20 minutes a day. You can play soothing, relaxing music while you do them, if you wish. · Tell others in your house that you are going to do your relaxation exercises. Ask them not to disturb you. · Find a comfortable place, away from all distractions and noise. · Lie down on your back, or sit with your back straight. · Focus on your breathing. Make it slow and steady. · Breathe in through your nose. Breathe out through either your nose or mouth. · Breathe deeply, filling up the area between your navel and your rib cage. Breathe so that your belly goes up and down. · Do not hold your breath. · Breathe like this for 5 to 10 minutes. Notice the feeling of calmness throughout your whole body. As you continue to breathe slowly and deeply, relax by doing the following for another 5 to 10 minutes:  · Tighten and relax each muscle group in your body. You can begin at your toes and work your way up to your head. · Imagine your muscle groups relaxing and becoming heavy. · Empty your mind of all thoughts. · Let yourself relax more and more deeply. · Become aware of the state of calmness that surrounds you. · When your relaxation time is over, you can bring yourself back to alertness by moving your fingers and toes and then your hands and feet and then stretching and moving your entire body. Sometimes people fall asleep during relaxation, but they usually wake up shortly afterward.   · Always give yourself time to return to full alertness before you drive a car or do anything that might cause an accident if you are not fully alert. Never play a relaxation tape while you drive a car. When should you call for help? Call 911 anytime you think you may need emergency care. For example, call if:  ? · You feel you cannot stop from hurting yourself or someone else. ? Keep the numbers for these national suicide hotlines: 7-029-364-TALK (9-840.898.7287) and 8-207-FOUBXFQ (0-796.100.2355). If you or someone you know talks about suicide or feeling hopeless, get help right away. ? Watch closely for changes in your health, and be sure to contact your doctor if:  ? · You have anxiety or fear that affects your life. ? · You have symptoms of anxiety that are new or different from those you had before. Where can you learn more? Go to http://shrutiEndoEvolutionpilo.info/. Enter P754 in the search box to learn more about \"Anxiety Disorder: Care Instructions. \"  Current as of: May 12, 2017  Content Version: 11.4  © 0930-9177 [employer]. Care instructions adapted under license by [employer] (which disclaims liability or warranty for this information). If you have questions about a medical condition or this instruction, always ask your healthcare professional. [address] any warranty or liability for your use of this information. Managing Side Effects of Chemotherapy: Care Instructions  Your Care Instructions    Cancer is often treated with medicines that destroy the cancer cells (chemotherapy). These medicines may slow cancer growth and prevent or stop the spread of cancer. Chemotherapy also can affect healthy cells and cause side effects. Most people can work and do their normal activities after and even during chemotherapy, but they may need to limit their schedules. Side effects of chemotherapy may include nausea and vomiting, loss of appetite, pain, and being tired. Some medicines can cause diarrhea or mouth sores.  Your doctor may prescribe medicines to treat the side effects. Your doctor will advise you to take extra care to prevent illnesses and infections, because chemotherapy weakens your natural defenses. Follow-up care is a key part of your treatment and safety. Be sure to make and go to all appointments, and call your doctor if you are having problems. It's also a good idea to know your test results and keep a list of the medicines you take. How can you care for yourself at home? Medicines  ? · Take your medicines exactly as prescribed. Call your doctor if you think you are having a problem with your medicine. You may get medicine for nausea and vomiting if you have these side effects. ?Nausea and vomiting  ? · A light meal or snack before chemotherapy may help prevent nausea. If you do have nausea during your treatment, try eating earlier-at least an hour or two before your next treatment. After your treatment, you may want to wait one or more hours before you eat again. ? · Drink fluids with your meals and an hour before or after meals. ? · After vomiting has stopped for 1 hour, sip a rehydration drink, such as Powerade or Gatorade. ? · Drink plenty of fluids to prevent dehydration. Choose water and other caffeine-free clear liquids until you feel better. Try clear fluids, such as apple or grape juice mixed to half strength with water, rehydration drinks, weak tea with sugar, clear broth, and gelatin dessert. Do not drink citrus juices. If you have kidney, heart, or liver disease and have to limit fluids, talk with your doctor before you increase the amount of fluids you drink. ? · When you are feeling better, begin eating clear soups and mild foods until all symptoms are gone for 12 to 48 hours. Other good choices include dry toast, crackers, cooked cereal, and gelatin dessert, such as Jell-O.   ? · If your vomiting is not getting better or is getting worse, call your doctor right away. ? Loss of appetite  ? · It's important to eat healthy food.  If you do not feel like eating, try to eat food that has protein and extra calories to keep up your strength and prevent weight loss. You can drink liquid meal replacements for extra calories and protein. ? · Try eating several smaller meals throughout the day. Set a schedule for meals and snacks, and plan for times when it feels best to eat. Try to eat your main meal early. ? · After treatment, you may want to wait for a while to eat. You can also try eating earlier before treatment. ? · Try to eat more of the foods you like during the days and times when your appetite is good. ? · When you don't feel like eating your normal foods, try clear broths/soups and mild foods like toast, crackers, cooked cereal like oatmeal, and gelatin dessert. Eating soft, bland foods may help. ?Pain control  ? · If your doctor prescribes medicines to control pain, take them as directed. Often your doctor will have you take these medicines regularly to keep your pain under control. Medicine for pain may cause side effects. Let your doctor know if you feel constipated, have trouble urinating, or have nausea. ? · Try using relaxation exercises to lower your anxiety and stress, which can increase pain. ? · Keep track of your pain so you can tell your doctor what your pain is like. Write down where you feel pain, how long it lasts, what seems to bring it on, and how it feels. Also note what makes the pain feel better or worse. ? · If you have mouth pain, your doctor may prescribe a special mouth rinse that can help relieve the pain. ?Weakness and feeling tired  ? · Get extra rest. Plan ahead so you can take breaks or naps. ? · Save your energy for the most important things you want to do. ? · Try to get some exercise, such as walking, but stop if you are too tired. ? · Eat a balanced diet. Do not skip meals, especially breakfast.   ? · Do something you enjoy. Do you like to listen to music?  Spend some time listening to your favorite music. Or find another way to relax by reading, watching a movie, or playing games. ? · Ask family and friends to help with home chores and other tasks. ? To prevent infections  ? · Wash your hands often during the day, especially before you eat and after you use the bathroom. ? · Stay away from people who have illnesses that you might catch, such as the flu or a cold. ? · Try to stay out of crowds. ? · Clean cuts and scrapes right away with warm water and soap. Clean them daily until they are healed. ? · Keep track of your temperature, if your doctor recommends it. You can do this by taking your temperature at regular times and writing it down. ? Hair loss  ? · Use a mild shampoo and a soft hair brush. ? · Use a low setting on your hair dryer. Do not color or perm your hair. ? · Have your hair cut short. It will look thicker and clemens, and it will not be such a shock if you lose hair. ? · Use sunscreen and a hat, scarf, or turban to protect your scalp from the sun. ? · Ask your doctor about other treatments that you may try to prevent or minimize hair loss. These may include the use of a cooling cap. When should you call for help? Call 911 anytime you think you may need emergency care. For example, call if:  ? · You passed out (lost consciousness). ?Call your doctor now or seek immediate medical care if:  ? · You have a fever. ? · You have abnormal bleeding. ? · You have new or worse pain. ? · You think you have an infection. ? · You have new symptoms, such as a cough, belly pain, vomiting, diarrhea, or a rash. ? Watch closely for changes in your health, and be sure to contact your doctor if:  ? · You are much more tired than usual.   ? · You have swollen glands in your armpits, groin, or neck. ? · You do not get better as expected. Where can you learn more? Go to http://shruti-pilo.info/.   Enter [phone] in the search box to learn more about \"Managing Side Effects of Chemotherapy: Care Instructions. \"  Current as of: May 12, 2017  Content Version: 11.4  © 8232-5420 [employer]. Care instructions adapted under license by [employer] (which disclaims liability or warranty for this information). If you have questions about a medical condition or this instruction, always ask your healthcare professional. [address] any warranty or liability for your use of this information. Patient armband removed and shredded  DISCHARGE SUMMARY from Nurse    PATIENT INSTRUCTIONS:    After general anesthesia or intravenous sedation, for 24 hours or while taking prescription Narcotics:  · Limit your activities  · Do not drive and operate hazardous machinery  · Do not make important personal or business decisions  · Do  not drink alcoholic beverages  · If you have not urinated within 8 hours after discharge, please contact your surgeon on call. Report the following to your surgeon:  · Excessive pain, swelling, redness or odor of or around the surgical area  · Temperature over 100.5  · Nausea and vomiting lasting longer than 4 hours or if unable to take medications  · Any signs of decreased circulation or nerve impairment to extremity: change in color, persistent  numbness, tingling, coldness or increase pain  · Any questions    What to do at Home:  Recommended activity: Activity as tolerated. If you experience any of the following symptoms TEMPERATURE GREATER THAN 100.5, ABDOMINAL PAIN,NAUSEA AND VOMITING. *  Please give a list of your current medications to your Primary Care Provider. *  Please update this list whenever your medications are discontinued, doses are      changed, or new medications (including over-the-counter products) are added. *  Please carry medication information at all times in case of emergency situations.     These are general instructions for a healthy lifestyle:    No smoking/ No tobacco products/ Avoid exposure to second hand smoke  Surgeon General's Warning:  Quitting smoking now greatly reduces serious risk to your health. Obesity, smoking, and sedentary lifestyle greatly increases your risk for illness    A healthy diet, regular physical exercise & weight monitoring are important for maintaining a healthy lifestyle    You may be retaining fluid if you have a history of heart failure or if you experience any of the following symptoms:  Weight gain of 3 pounds or more overnight or 5 pounds in a week, increased swelling in our hands or feet or shortness of breath while lying flat in bed. Please call your doctor as soon as you notice any of these symptoms; do not wait until your next office visit. Recognize signs and symptoms of STROKE:    F-face looks uneven    A-arms unable to move or move unevenly    S-speech slurred or non-existent    T-time-call 911 as soon as signs and symptoms begin-DO NOT go       Back to bed or wait to see if you get better-TIME IS BRAIN. Warning Signs of HEART ATTACK     Call 911 if you have these symptoms:   Chest discomfort. Most heart attacks involve discomfort in the center of the chest that lasts more than a few minutes, or that goes away and comes back. It can feel like uncomfortable pressure, squeezing, fullness, or pain.  Discomfort in other areas of the upper body. Symptoms can include pain or discomfort in one or both arms, the back, neck, jaw, or stomach.  Shortness of breath with or without chest discomfort.  Other signs may include breaking out in a cold sweat, nausea, or lightheadedness. Don't wait more than five minutes to call 911 - MINUTES MATTER! Fast action can save your life. Calling 911 is almost always the fastest way to get lifesaving treatment. Emergency Medical Services staff can begin treatment when they arrive -- up to an hour sooner than if someone gets to the hospital by car.      The discharge information has been reviewed with the patient. The patient verbalized understanding. Discharge medications reviewed with the patient and appropriate educational materials and side effects teaching were provided. ___________________________________________________________________________________________________________________________________       Extreme Nausea and Vomiting in Pregnancy: Care Instructions  Your Care Instructions    Nausea and vomiting (often called morning sickness) are common in pregnancy. They are caused by pregnancy hormones and happen most often in the first 3 months. Some women get very sick and are not able to keep down food and fluids. This extreme morning sickness is called hyperemesis gravidarum. It can lead to a dangerous loss of fluids in the body. It also can keep you from gaining weight and getting proper nutrition during your pregnancy. Your body fluids are put back in balance with water and minerals called electrolytes. Medicine may help if you have severe nausea and vomiting. Follow-up care is a key part of your treatment and safety. Be sure to make and go to all appointments, and call your doctor if you are having problems. It's also a good idea to know your test results and keep a list of the medicines you take. How can you care for yourself at home? · Take your medicines exactly as prescribed. Call your doctor if you think you are having a problem with your medicine. · Drink plenty of fluids to prevent dehydration. Choose water and other caffeine-free clear liquids until you feel better. Try sipping on sports drinks that have salt and sugar in them. · Eat a small snack, such as crackers, before you get out of bed. Wait a few minutes, then get out of bed slowly. · Keep food in your stomach, but not too much at once. An empty stomach can make nausea worse. Eat several small meals every day instead of three large meals. · Eat more protein and less fat.   · Get plenty of vitamin B6 by eating whole grains, nuts, seeds, and legumes. You can take vitamin B6 tablets if your doctor says it is okay. · Try to avoid smells and foods that make you feel sick to your stomach. · Get lots of rest.  · You may want to try acupressure bands. They put pressure on an acupressure point in the wrist. Some women feel better using the bands. · Taylor may also help you feel better. You can use it in tea, take it as a pill, or use a taylor syrup that you can buy at a health food store. When should you call for help? Call 911 anytime you think you may need emergency care. For example, call if:  ? · You passed out (lost consciousness). ?Call your doctor now or seek immediate medical care if:  ? · You are sick to your stomach or cannot drink fluids. ? · You have symptoms of dehydration, such as:  ¨ Dry eyes and a dry mouth. ¨ Passing only a little urine. ¨ Feeling thirstier than usual.   ? · You are not able to keep down your medicines. ? · You have pain in your belly or pelvis. ? Watch closely for changes in your health, and be sure to contact your doctor if:  ? · You do not get better as expected. Where can you learn more? Go to http://shruti-pilo.info/. Enter L028 in the search box to learn more about \"Extreme Nausea and Vomiting in Pregnancy: Care Instructions. \"  Current as of: March 16, 2017  Content Version: 11.4  © 9771-6765 [employer]. Care instructions adapted under license by [employer] (which disclaims liability or warranty for this information). If you have questions about a medical condition or this instruction, always ask your healthcare professional. [place] [address] any warranty or liability for your use of this information.

## 2018-02-06 NOTE — PROGRESS NOTES
Problem: Falls - Risk of  Goal: *Absence of Falls  Document Lian Fall Risk and appropriate interventions in the flowsheet.    Outcome: Resolved/Met Date Met: 02/06/18  Fall Risk Interventions:            Medication Interventions: Patient to call before getting OOB    Elimination Interventions: Call light in reach

## 2018-02-06 NOTE — PROGRESS NOTES
5844  Received report. Patient A/OX4. At this time, patient is calm. Denies pain. No acute distress noted. Call light within reach. 1025  Redness and swelling noted to RUE. Stopped IV fluid,removed IV and applied cold compression to site. 1140  Patient resting in bed. Call light within reach. 1400  No acute distress noted. 1530 Bedside and Verbal shift change report given to ANGLE DOYLE (oncoming nurse) by Kandy Navarro (offgoing nurse). Report included the following information SBAR and Kardex. 1545  Mag level 2.4,K+ 4.9. Discharge instructions and care notes reviewed with and given to patient. Patient able to retain information. IV removed and gauze placed to site.
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
## 2018-02-06 NOTE — ROUTINE PROCESS
Bedside and Verbal shift change report given to Christina Devlin (oncoming nurse) by Darius Rodriguez RN (offgoing nurse). Report included the following information SBAR, Kardex, Procedure Summary, Intake/Output, MAR, Accordion and Recent Results.

## 2018-02-10 LAB
BACTERIA SPEC CULT: NORMAL
SERVICE CMNT-IMP: NORMAL

## 2018-02-11 LAB
BACTERIA SPEC CULT: NORMAL
SERVICE CMNT-IMP: NORMAL

## 2018-03-11 ENCOUNTER — APPOINTMENT (OUTPATIENT)
Dept: ULTRASOUND IMAGING | Age: 33
DRG: 781 | End: 2018-03-11
Attending: EMERGENCY MEDICINE
Payer: SELF-PAY

## 2018-03-11 ENCOUNTER — APPOINTMENT (OUTPATIENT)
Dept: GENERAL RADIOLOGY | Age: 33
DRG: 781 | End: 2018-03-11
Attending: EMERGENCY MEDICINE
Payer: SELF-PAY

## 2018-03-11 ENCOUNTER — HOSPITAL ENCOUNTER (INPATIENT)
Age: 33
LOS: 1 days | Discharge: LEFT BEFORE DISCHARGE | DRG: 781 | End: 2018-03-12
Attending: EMERGENCY MEDICINE | Admitting: HOSPITALIST
Payer: SELF-PAY

## 2018-03-11 DIAGNOSIS — E86.0 DEHYDRATION: ICD-10-CM

## 2018-03-11 DIAGNOSIS — R79.89 ELEVATED LACTIC ACID LEVEL: ICD-10-CM

## 2018-03-11 DIAGNOSIS — D72.829 LEUKOCYTOSIS, UNSPECIFIED TYPE: ICD-10-CM

## 2018-03-11 DIAGNOSIS — R11.2 NON-INTRACTABLE VOMITING WITH NAUSEA, UNSPECIFIED VOMITING TYPE: ICD-10-CM

## 2018-03-11 DIAGNOSIS — Z3A.11 11 WEEKS GESTATION OF PREGNANCY: ICD-10-CM

## 2018-03-11 DIAGNOSIS — B18.2 CHRONIC HEPATITIS C WITHOUT HEPATIC COMA (HCC): ICD-10-CM

## 2018-03-11 DIAGNOSIS — F14.10 COCAINE ABUSE (HCC): ICD-10-CM

## 2018-03-11 DIAGNOSIS — R10.9 LEFT FLANK PAIN: Primary | ICD-10-CM

## 2018-03-11 PROBLEM — Z34.90 PREGNANCY: Status: ACTIVE | Noted: 2018-03-11

## 2018-03-11 LAB
ALBUMIN SERPL-MCNC: 4.2 G/DL (ref 3.4–5)
ALBUMIN/GLOB SERPL: 0.9 {RATIO} (ref 0.8–1.7)
ALP SERPL-CCNC: 55 U/L (ref 45–117)
ALT SERPL-CCNC: 48 U/L (ref 13–56)
AMPHET UR QL SCN: NEGATIVE
ANION GAP SERPL CALC-SCNC: 16 MMOL/L (ref 3–18)
APPEARANCE UR: ABNORMAL
AST SERPL-CCNC: 26 U/L (ref 15–37)
BACTERIA URNS QL MICRO: ABNORMAL /HPF
BARBITURATES UR QL SCN: NEGATIVE
BASOPHILS # BLD: 0 K/UL (ref 0–0.1)
BASOPHILS NFR BLD: 0 % (ref 0–3)
BENZODIAZ UR QL: NEGATIVE
BILIRUB SERPL-MCNC: 0.5 MG/DL (ref 0.2–1)
BILIRUB UR QL: NEGATIVE
BUN SERPL-MCNC: 17 MG/DL (ref 7–18)
BUN/CREAT SERPL: 17 (ref 12–20)
CALCIUM SERPL-MCNC: 10.1 MG/DL (ref 8.5–10.1)
CANNABINOIDS UR QL SCN: NEGATIVE
CHLORIDE SERPL-SCNC: 98 MMOL/L (ref 100–108)
CO2 SERPL-SCNC: 24 MMOL/L (ref 21–32)
COCAINE UR QL SCN: POSITIVE
COLOR UR: ABNORMAL
CREAT SERPL-MCNC: 1.03 MG/DL (ref 0.6–1.3)
DIFFERENTIAL METHOD BLD: ABNORMAL
EOSINOPHIL # BLD: 0 K/UL (ref 0–0.4)
EOSINOPHIL NFR BLD: 0 % (ref 0–5)
EPITH CASTS URNS QL MICRO: ABNORMAL /LPF (ref 0–5)
ERYTHROCYTE [DISTWIDTH] IN BLOOD BY AUTOMATED COUNT: 15.8 % (ref 11.6–14.5)
GLOBULIN SER CALC-MCNC: 4.6 G/DL (ref 2–4)
GLUCOSE SERPL-MCNC: 127 MG/DL (ref 74–99)
GLUCOSE UR STRIP.AUTO-MCNC: NEGATIVE MG/DL
HCT VFR BLD AUTO: 39.2 % (ref 35–45)
HDSCOM,HDSCOM: ABNORMAL
HGB BLD-MCNC: 13.3 G/DL (ref 12–16)
HGB UR QL STRIP: NEGATIVE
KETONES UR QL STRIP.AUTO: 40 MG/DL
LACTATE BLD-SCNC: 2.5 MMOL/L (ref 0.4–2)
LACTATE SERPL-SCNC: 2.6 MMOL/L (ref 0.4–2)
LEUKOCYTE ESTERASE UR QL STRIP.AUTO: NEGATIVE
LIPASE SERPL-CCNC: 84 U/L (ref 73–393)
LYMPHOCYTES # BLD: 1.2 K/UL (ref 0.8–3.5)
LYMPHOCYTES NFR BLD: 5 % (ref 20–51)
MCH RBC QN AUTO: 28.9 PG (ref 24–34)
MCHC RBC AUTO-ENTMCNC: 33.9 G/DL (ref 31–37)
MCV RBC AUTO: 85 FL (ref 74–97)
METHADONE UR QL: NEGATIVE
MONOCYTES # BLD: 0.7 K/UL (ref 0–1)
MONOCYTES NFR BLD: 3 % (ref 2–9)
NEUTS SEG # BLD: 22.3 K/UL (ref 1.8–8)
NEUTS SEG NFR BLD: 92 % (ref 42–75)
NITRITE UR QL STRIP.AUTO: NEGATIVE
OPIATES UR QL: NEGATIVE
PCP UR QL: NEGATIVE
PH UR STRIP: 5.5 [PH] (ref 5–8)
PLATELET # BLD AUTO: 437 K/UL (ref 135–420)
PMV BLD AUTO: 10.9 FL (ref 9.2–11.8)
POTASSIUM SERPL-SCNC: 3.4 MMOL/L (ref 3.5–5.5)
PROT SERPL-MCNC: 8.8 G/DL (ref 6.4–8.2)
PROT UR STRIP-MCNC: 30 MG/DL
RBC # BLD AUTO: 4.61 M/UL (ref 4.2–5.3)
RBC #/AREA URNS HPF: ABNORMAL /HPF (ref 0–5)
RBC MORPH BLD: ABNORMAL
SODIUM SERPL-SCNC: 138 MMOL/L (ref 136–145)
SP GR UR REFRACTOMETRY: >1.03 (ref 1–1.03)
UROBILINOGEN UR QL STRIP.AUTO: 1 EU/DL (ref 0.2–1)
WBC # BLD AUTO: 24.2 K/UL (ref 4.6–13.2)
WBC URNS QL MICRO: ABNORMAL /HPF (ref 0–5)

## 2018-03-11 PROCEDURE — 83690 ASSAY OF LIPASE: CPT | Performed by: EMERGENCY MEDICINE

## 2018-03-11 PROCEDURE — 65660000000 HC RM CCU STEPDOWN

## 2018-03-11 PROCEDURE — 74011250636 HC RX REV CODE- 250/636: Performed by: EMERGENCY MEDICINE

## 2018-03-11 PROCEDURE — 96375 TX/PRO/DX INJ NEW DRUG ADDON: CPT

## 2018-03-11 PROCEDURE — 74011000258 HC RX REV CODE- 258: Performed by: EMERGENCY MEDICINE

## 2018-03-11 PROCEDURE — 87040 BLOOD CULTURE FOR BACTERIA: CPT | Performed by: EMERGENCY MEDICINE

## 2018-03-11 PROCEDURE — 81001 URINALYSIS AUTO W/SCOPE: CPT | Performed by: EMERGENCY MEDICINE

## 2018-03-11 PROCEDURE — 76705 ECHO EXAM OF ABDOMEN: CPT

## 2018-03-11 PROCEDURE — 96361 HYDRATE IV INFUSION ADD-ON: CPT

## 2018-03-11 PROCEDURE — 85025 COMPLETE CBC W/AUTO DIFF WBC: CPT | Performed by: EMERGENCY MEDICINE

## 2018-03-11 PROCEDURE — 83605 ASSAY OF LACTIC ACID: CPT | Performed by: EMERGENCY MEDICINE

## 2018-03-11 PROCEDURE — 80053 COMPREHEN METABOLIC PANEL: CPT | Performed by: EMERGENCY MEDICINE

## 2018-03-11 PROCEDURE — 99284 EMERGENCY DEPT VISIT MOD MDM: CPT

## 2018-03-11 PROCEDURE — 71045 X-RAY EXAM CHEST 1 VIEW: CPT

## 2018-03-11 PROCEDURE — 96365 THER/PROPH/DIAG IV INF INIT: CPT

## 2018-03-11 PROCEDURE — 80307 DRUG TEST PRSMV CHEM ANLYZR: CPT | Performed by: EMERGENCY MEDICINE

## 2018-03-11 PROCEDURE — 83605 ASSAY OF LACTIC ACID: CPT

## 2018-03-11 RX ORDER — SODIUM CHLORIDE 0.9 % (FLUSH) 0.9 %
5-10 SYRINGE (ML) INJECTION AS NEEDED
Status: DISCONTINUED | OUTPATIENT
Start: 2018-03-11 | End: 2018-03-13 | Stop reason: HOSPADM

## 2018-03-11 RX ORDER — ONDANSETRON 2 MG/ML
4 INJECTION INTRAMUSCULAR; INTRAVENOUS ONCE
Status: DISCONTINUED | OUTPATIENT
Start: 2018-03-11 | End: 2018-03-11

## 2018-03-11 RX ORDER — MORPHINE SULFATE 4 MG/ML
4 INJECTION INTRAVENOUS
Status: COMPLETED | OUTPATIENT
Start: 2018-03-11 | End: 2018-03-11

## 2018-03-11 RX ADMIN — PROMETHAZINE HYDROCHLORIDE 12.5 MG: 25 INJECTION, SOLUTION INTRAMUSCULAR; INTRAVENOUS at 20:11

## 2018-03-11 RX ADMIN — SODIUM CHLORIDE 1000 ML: 900 INJECTION, SOLUTION INTRAVENOUS at 19:56

## 2018-03-11 RX ADMIN — SODIUM CHLORIDE 1566 ML: 900 INJECTION, SOLUTION INTRAVENOUS at 22:45

## 2018-03-11 RX ADMIN — MORPHINE SULFATE 4 MG: 4 INJECTION INTRAVENOUS at 21:46

## 2018-03-11 RX ADMIN — PIPERACILLIN SODIUM AND TAZOBACTAM SODIUM 4.5 G: .5; 4 INJECTION, POWDER, LYOPHILIZED, FOR SOLUTION INTRAVENOUS at 22:46

## 2018-03-11 NOTE — ED TRIAGE NOTES
Patient presents to the ER with anxiety. Patient screaming and rocking in the wheelchair. Patient uncontrollable. Patient states that she has a UTI. Patient states that she is pregnant and has an appointment on the  for an . Sepsis Screening completed    (  )Patient meets SIRS criteria. (x  )Patient does not meet SIRS criteria.       SIRS Criteria is achieved when two or more of the following are present   Temperature < 96.8°F (36°C) or > 100.9°F (38.3°C)   Heart Rate > 90 beats per minute   Respiratory Rate > 20 breaths per minute   WBC count > 12,000 or <4,000 or > 10% bands

## 2018-03-12 VITALS
DIASTOLIC BLOOD PRESSURE: 52 MMHG | SYSTOLIC BLOOD PRESSURE: 88 MMHG | BODY MASS INDEX: 23.18 KG/M2 | HEIGHT: 59 IN | RESPIRATION RATE: 20 BRPM | TEMPERATURE: 98.8 F | WEIGHT: 115 LBS | HEART RATE: 82 BPM | OXYGEN SATURATION: 100 %

## 2018-03-12 PROBLEM — F17.200 SMOKER: Status: ACTIVE | Noted: 2018-03-12

## 2018-03-12 PROBLEM — B19.20 HEPATITIS C: Status: ACTIVE | Noted: 2018-03-12

## 2018-03-12 LAB
LACTATE SERPL-SCNC: 1.7 MMOL/L (ref 0.4–2)
LACTATE SERPL-SCNC: 3.1 MMOL/L (ref 0.4–2)

## 2018-03-12 PROCEDURE — 83605 ASSAY OF LACTIC ACID: CPT | Performed by: HOSPITALIST

## 2018-03-12 PROCEDURE — 74011250636 HC RX REV CODE- 250/636: Performed by: HOSPITALIST

## 2018-03-12 PROCEDURE — 93306 TTE W/DOPPLER COMPLETE: CPT

## 2018-03-12 PROCEDURE — 36415 COLL VENOUS BLD VENIPUNCTURE: CPT | Performed by: EMERGENCY MEDICINE

## 2018-03-12 PROCEDURE — 74011250636 HC RX REV CODE- 250/636

## 2018-03-12 PROCEDURE — 74011000258 HC RX REV CODE- 258: Performed by: PHYSICIAN ASSISTANT

## 2018-03-12 PROCEDURE — 83605 ASSAY OF LACTIC ACID: CPT | Performed by: EMERGENCY MEDICINE

## 2018-03-12 PROCEDURE — 74011250637 HC RX REV CODE- 250/637: Performed by: HOSPITALIST

## 2018-03-12 PROCEDURE — 74011250636 HC RX REV CODE- 250/636: Performed by: PHYSICIAN ASSISTANT

## 2018-03-12 PROCEDURE — 74011250636 HC RX REV CODE- 250/636: Performed by: EMERGENCY MEDICINE

## 2018-03-12 RX ORDER — ONDANSETRON 2 MG/ML
4 INJECTION INTRAMUSCULAR; INTRAVENOUS
Status: DISCONTINUED | OUTPATIENT
Start: 2018-03-12 | End: 2018-03-13 | Stop reason: HOSPADM

## 2018-03-12 RX ORDER — MORPHINE SULFATE 2 MG/ML
2 INJECTION, SOLUTION INTRAMUSCULAR; INTRAVENOUS ONCE
Status: COMPLETED | OUTPATIENT
Start: 2018-03-12 | End: 2018-03-12

## 2018-03-12 RX ORDER — VANCOMYCIN HYDROCHLORIDE
1250 EVERY 24 HOURS
Status: DISCONTINUED | OUTPATIENT
Start: 2018-03-12 | End: 2018-03-13 | Stop reason: HOSPADM

## 2018-03-12 RX ORDER — PROMETHAZINE HYDROCHLORIDE 6.25 MG/5ML
12.5 SYRUP ORAL
Status: DISCONTINUED | OUTPATIENT
Start: 2018-03-12 | End: 2018-03-12

## 2018-03-12 RX ORDER — NICOTINE 7MG/24HR
1 PATCH, TRANSDERMAL 24 HOURS TRANSDERMAL EVERY 24 HOURS
Status: DISCONTINUED | OUTPATIENT
Start: 2018-03-12 | End: 2018-03-13 | Stop reason: HOSPADM

## 2018-03-12 RX ORDER — SODIUM CHLORIDE 9 MG/ML
125 INJECTION, SOLUTION INTRAVENOUS CONTINUOUS
Status: DISCONTINUED | OUTPATIENT
Start: 2018-03-12 | End: 2018-03-13 | Stop reason: HOSPADM

## 2018-03-12 RX ORDER — LORAZEPAM 2 MG/ML
INJECTION INTRAMUSCULAR
Status: COMPLETED
Start: 2018-03-12 | End: 2018-03-12

## 2018-03-12 RX ORDER — PROMETHAZINE HYDROCHLORIDE 25 MG/1
12.5 TABLET ORAL
Status: DISCONTINUED | OUTPATIENT
Start: 2018-03-12 | End: 2018-03-12

## 2018-03-12 RX ORDER — POTASSIUM CHLORIDE 20 MEQ/1
40 TABLET, EXTENDED RELEASE ORAL
Status: COMPLETED | OUTPATIENT
Start: 2018-03-12 | End: 2018-03-12

## 2018-03-12 RX ORDER — KETOROLAC TROMETHAMINE 30 MG/ML
15 INJECTION, SOLUTION INTRAMUSCULAR; INTRAVENOUS
Status: DISCONTINUED | OUTPATIENT
Start: 2018-03-12 | End: 2018-03-12

## 2018-03-12 RX ORDER — ENOXAPARIN SODIUM 100 MG/ML
40 INJECTION SUBCUTANEOUS EVERY 24 HOURS
Status: DISCONTINUED | OUTPATIENT
Start: 2018-03-12 | End: 2018-03-13 | Stop reason: HOSPADM

## 2018-03-12 RX ORDER — PROMETHAZINE HYDROCHLORIDE 25 MG/1
25 TABLET ORAL
COMMUNITY
End: 2018-04-08

## 2018-03-12 RX ORDER — DOCUSATE SODIUM 100 MG/1
100 CAPSULE, LIQUID FILLED ORAL 2 TIMES DAILY
Status: DISCONTINUED | OUTPATIENT
Start: 2018-03-12 | End: 2018-03-13 | Stop reason: HOSPADM

## 2018-03-12 RX ORDER — NALOXONE HYDROCHLORIDE 0.4 MG/ML
0.4 INJECTION, SOLUTION INTRAMUSCULAR; INTRAVENOUS; SUBCUTANEOUS AS NEEDED
Status: DISCONTINUED | OUTPATIENT
Start: 2018-03-12 | End: 2018-03-13 | Stop reason: HOSPADM

## 2018-03-12 RX ORDER — DIPHENHYDRAMINE HYDROCHLORIDE 50 MG/ML
12.5 INJECTION, SOLUTION INTRAMUSCULAR; INTRAVENOUS
Status: DISCONTINUED | OUTPATIENT
Start: 2018-03-12 | End: 2018-03-13 | Stop reason: HOSPADM

## 2018-03-12 RX ORDER — KETOROLAC TROMETHAMINE 15 MG/ML
INJECTION, SOLUTION INTRAMUSCULAR; INTRAVENOUS
Status: COMPLETED
Start: 2018-03-12 | End: 2018-03-12

## 2018-03-12 RX ORDER — LORAZEPAM 2 MG/ML
1 INJECTION INTRAMUSCULAR
Status: DISCONTINUED | OUTPATIENT
Start: 2018-03-12 | End: 2018-03-13 | Stop reason: HOSPADM

## 2018-03-12 RX ORDER — KETOROLAC TROMETHAMINE 30 MG/ML
30 INJECTION, SOLUTION INTRAMUSCULAR; INTRAVENOUS
Status: DISCONTINUED | OUTPATIENT
Start: 2018-03-12 | End: 2018-03-13 | Stop reason: HOSPADM

## 2018-03-12 RX ORDER — AMOXICILLIN 500 MG/1
500 CAPSULE ORAL 3 TIMES DAILY
Qty: 21 CAP | Refills: 0 | Status: SHIPPED | OUTPATIENT
Start: 2018-03-12 | End: 2018-04-08

## 2018-03-12 RX ORDER — LIDOCAINE 50 MG/G
1 PATCH TOPICAL EVERY 24 HOURS
Status: DISCONTINUED | OUTPATIENT
Start: 2018-03-12 | End: 2018-03-13 | Stop reason: HOSPADM

## 2018-03-12 RX ORDER — ACETAMINOPHEN 325 MG/1
650 TABLET ORAL
Status: DISCONTINUED | OUTPATIENT
Start: 2018-03-12 | End: 2018-03-13 | Stop reason: HOSPADM

## 2018-03-12 RX ADMIN — LORAZEPAM 1 MG: 2 INJECTION INTRAMUSCULAR at 11:41

## 2018-03-12 RX ADMIN — LORAZEPAM 1 MG: 2 INJECTION INTRAMUSCULAR at 20:30

## 2018-03-12 RX ADMIN — LORAZEPAM 1 MG: 2 INJECTION INTRAMUSCULAR at 01:42

## 2018-03-12 RX ADMIN — LORAZEPAM 1 MG: 2 INJECTION INTRAMUSCULAR at 03:53

## 2018-03-12 RX ADMIN — LORAZEPAM 1 MG: 2 INJECTION INTRAMUSCULAR at 07:45

## 2018-03-12 RX ADMIN — LORAZEPAM 1 MG: 2 INJECTION INTRAMUSCULAR at 18:37

## 2018-03-12 RX ADMIN — KETOROLAC TROMETHAMINE 15 MG: 30 INJECTION, SOLUTION INTRAMUSCULAR at 15:51

## 2018-03-12 RX ADMIN — POTASSIUM CHLORIDE 40 MEQ: 20 TABLET, EXTENDED RELEASE ORAL at 02:16

## 2018-03-12 RX ADMIN — VANCOMYCIN HYDROCHLORIDE 1250 MG: 10 INJECTION, POWDER, LYOPHILIZED, FOR SOLUTION INTRAVENOUS at 02:15

## 2018-03-12 RX ADMIN — KETOROLAC TROMETHAMINE 15 MG: 15 INJECTION, SOLUTION INTRAMUSCULAR; INTRAVENOUS at 01:43

## 2018-03-12 RX ADMIN — ENOXAPARIN SODIUM 40 MG: 40 INJECTION SUBCUTANEOUS at 02:15

## 2018-03-12 RX ADMIN — PROMETHAZINE HYDROCHLORIDE 12.5 MG: 25 INJECTION, SOLUTION INTRAMUSCULAR; INTRAVENOUS at 15:41

## 2018-03-12 RX ADMIN — ONDANSETRON 4 MG: 2 INJECTION INTRAMUSCULAR; INTRAVENOUS at 03:58

## 2018-03-12 RX ADMIN — PIPERACILLIN SODIUM AND TAZOBACTAM SODIUM 4.5 G: .5; 4 INJECTION, POWDER, LYOPHILIZED, FOR SOLUTION INTRAVENOUS at 18:37

## 2018-03-12 RX ADMIN — PROMETHAZINE HYDROCHLORIDE 12.5 MG: 25 TABLET ORAL at 11:41

## 2018-03-12 RX ADMIN — ONDANSETRON 4 MG: 2 INJECTION INTRAMUSCULAR; INTRAVENOUS at 09:31

## 2018-03-12 RX ADMIN — PIPERACILLIN SODIUM AND TAZOBACTAM SODIUM 4.5 G: .5; 4 INJECTION, POWDER, LYOPHILIZED, FOR SOLUTION INTRAVENOUS at 05:28

## 2018-03-12 RX ADMIN — SODIUM CHLORIDE 125 ML/HR: 900 INJECTION, SOLUTION INTRAVENOUS at 01:43

## 2018-03-12 RX ADMIN — ACETAMINOPHEN 650 MG: 325 TABLET ORAL at 13:37

## 2018-03-12 RX ADMIN — LORAZEPAM 1 MG: 2 INJECTION INTRAMUSCULAR at 16:53

## 2018-03-12 RX ADMIN — PIPERACILLIN SODIUM AND TAZOBACTAM SODIUM 4.5 G: .5; 4 INJECTION, POWDER, LYOPHILIZED, FOR SOLUTION INTRAVENOUS at 12:54

## 2018-03-12 RX ADMIN — LORAZEPAM 1 MG: 2 INJECTION INTRAMUSCULAR at 13:37

## 2018-03-12 RX ADMIN — PROMETHAZINE HYDROCHLORIDE 12.5 MG: 25 TABLET ORAL at 02:53

## 2018-03-12 RX ADMIN — KETOROLAC TROMETHAMINE 15 MG: 30 INJECTION, SOLUTION INTRAMUSCULAR at 09:31

## 2018-03-12 RX ADMIN — MORPHINE SULFATE 2 MG: 2 INJECTION, SOLUTION INTRAMUSCULAR; INTRAVENOUS at 20:30

## 2018-03-12 RX ADMIN — DIPHENHYDRAMINE HYDROCHLORIDE 12.5 MG: 50 INJECTION, SOLUTION INTRAMUSCULAR; INTRAVENOUS at 12:55

## 2018-03-12 RX ADMIN — PROMETHAZINE HYDROCHLORIDE 12.5 MG: 25 TABLET ORAL at 07:45

## 2018-03-12 RX ADMIN — SODIUM CHLORIDE 500 ML: 900 INJECTION, SOLUTION INTRAVENOUS at 03:14

## 2018-03-12 NOTE — ED NOTES
Verbal report given to Wan Harrington on Nadira Fitzgeradl being transferred to [facility] (unit) for remote telemetry. Report consisted of patient's Situation, Background, Assessment and Recommendations (SBAR)    Information from the following report(s)     ED summary, VS, meds, labs was reviewed with the receiving nurse. Opportunity for questions and clarification was provided. Patient transported with:  Telemetry. Last Filed Values:  Temp: 98.5 °F (36.9 °C) (03/11/18 2352)  Pulse (Heart Rate): 82 (03/11/18 2352)  Resp Rate: 24 (03/11/18 2352)  O2 Sat (%): 100 % (03/11/18 2352)  BP: 126/87 (03/11/18 2352)  MAP (Calculated): 100 (03/11/18 2352)  Level of Consciousness: Alert (03/11/18 2352)      Lab Results   Component Value Date/Time    WBC 24.2 (H) 03/11/2018 07:50 PM    WBC 11.7 02/06/2018 04:40 AM    WBC 26.3 (H) 02/05/2018 02:41 AM    Lactic acid 2.6 (HH) 03/11/2018 09:37 PM    Lactic acid 1.0 01/18/2018 06:38 PM    Lactic acid 2.8 (HH) 01/18/2018 02:35 PM       Repeat LA:  Time Due 0210  Blood Cultures Drawn:  yes  Fluid Resuscitation:  Total needed 1566, Status  Take down NS boluses once complete. All Antibiotics Started:  Zosyn just completed in ED, Dose Due see MAR for next dose.     VS x 2 post-fluid resuscitation:   /87, HR 82, RR 24, O2 Sat 100 %, T 98.5 orally    Vasopressor Infusion:  none    Additional Interventions/Comments:  Repeat lactic acid at 0210

## 2018-03-12 NOTE — ED NOTES
Assumed care of pt from triage. Pt thrashing herself on the stretcher. Yelling at staff. \"I have anxiety. I have a bladder infection. My back hurts. \"  Pt putting all these demands on the staff. Pt has been redirected that this kind of behavior isn't appropriate. Pt not willing to listen. Have been informed that security has been notified twice by staff. Pt has PIV access/blood for labs complete per EDT.

## 2018-03-12 NOTE — CONSULTS
History & Physical      Patient: Zhou Mederos               Sex: female          DOA: 3/11/2018         YOB: 1985      Age:  29 y.o.        LOS:  LOS: 0 days       Patient: Zhou Mederos               Sex: female          DOA: [unfilled]       YOB: 1985      Age:  29 y.o.        LOS:  LOS: 0 days   Subjective:     Reason for Admission:  Pregnancy and substance abuse with left flank pain    History of Present Illness: Ms. Kathleen Maciel is a 29 y.o.  female with an estimated gestational age of Unknown with Estimated Date of Delivery: None noted. . Patient complains of moderate nausea/vomiting for a few weeks and c/o flank pain on left. Had been admitted for this pain a few weeks ago txed with antibioitics but ur c/s was neg. . Pregnancy has been complicated by nausea and vomiting and substance abuse with known he c. Patient denies nausea and vomiting. OB History      Para Term  AB Living    2 0 0 0 0     SAB TAB Ectopic Molar Multiple Live Births    0 0 0 0          Past Medical History:   Diagnosis Date    Anxiety     Asthma     Depression     Drug use     Endometriosis     Hepatitis C     Heroin abuse     Kidney infection      Past Surgical History:   Procedure Laterality Date    HX DILATION AND CURETTAGE      HX LEEP PROCEDURE       Social History     Occupational History    Not on file. Social History Main Topics    Smoking status: Current Every Day Smoker     Packs/day: 0.50     Years: 1.00    Smokeless tobacco: Never Used    Alcohol use Yes      Comment: Occasionally     Drug use: Yes     Special: Other, Heroin      Comment: spice    Sexual activity: Yes     Birth control/ protection: None      History reviewed. No pertinent family history. No Known Allergies  Prior to Admission medications    Medication Sig Start Date End Date Taking?  Authorizing Provider   promethazine (PHENERGAN) 25 mg tablet Take 25 mg by mouth every six (6) hours as needed for Nausea. Yes Historical Provider        Review of Systems:  A comprehensive review of systems was negative except for that written in the History of Present Illness. Objective:     Vitals:    Vitals:    18 2352 18 0300 18 0307 18 0802   BP: 126/87 (!) 79/48 105/80 90/49   Pulse: 82 92 90 89   Resp: 24 22  20   Temp: 98.5 °F (36.9 °C) 98.5 °F (36.9 °C)  98.3 °F (36.8 °C)   SpO2: 100% 100%  100%   Weight:       Height:          Temp (24hrs), Av.5 °F (36.9 °C), Min:98.3 °F (36.8 °C), Max:98.7 °F (37.1 °C)    BP  Min: 79/48  Max: 148/127     Physical Exam:  Patient without distress. difficult to talk falling asleep while trying to ask her questions  Abdomen: soft, nontender  Fetal Heart Rate: present in er on admission    Labs: All lab results for the last 24 hours reviewed. Assessment and Plan:     Substance abuse at 11 wks gestation. Has top planned. N/v may be from the pregnancy. Would suggest a renal ultrasound to evaluate for stones though no hematuria.  Will add phenergan for n/v    Signed By:  Catherine Cho MD     2018

## 2018-03-12 NOTE — ED PROVIDER NOTES
EMERGENCY DEPARTMENT HISTORY AND PHYSICAL EXAM    Date: 3/11/2018  Patient Name: Gregery Cranker    History of Presenting Illness     Chief Complaint   Patient presents with    Bladder Infection    Anxiety         History Provided By: Patient    Chief Complaint: Left flank pain  Duration: A few days   Timing:  Constant  Location: Left flank  Quality: Aching  Severity: 10 out of 10  Associated Symptoms: N/V and dysuria    Additional History (Context):   7:29 PM  Gregery Cranker is a 29 y.o. female who is 11-weeks pregnant with PMHx endometriosis, heroine and cocaine abuse, anxiety, hepatitis C, kidney infection, and asthma who presents ambulatory to the emergency department C/O left flank pain (rated 10/10), onset a few days ago. Associated sxs include N/V and dysuria. Pt reports that she plans on having an  on 3/16/18. Pt denies fever, cough, hematuria, or any other sxs or complaints. PCP: None    Current Facility-Administered Medications   Medication Dose Route Frequency Provider Last Rate Last Dose    sodium chloride (NS) flush 5-10 mL  5-10 mL IntraVENous PRN Hugh Roblero MD        piperacillin-tazobactam (ZOSYN) 4.5 g in  ml premix/cpmd  4.5 g IntraVENous Rolando Lutz MD   4.5 g at 18 6141       Past History     Past Medical History:  Past Medical History:   Diagnosis Date    Anxiety     Asthma     Depression     Drug use     Endometriosis     Hepatitis C     Heroin abuse     Kidney infection        Past Surgical History:  Past Surgical History:   Procedure Laterality Date    HX DILATION AND CURETTAGE      HX LEEP PROCEDURE         Family History:  History reviewed. No pertinent family history.     Social History:  Social History   Substance Use Topics    Smoking status: Current Every Day Smoker     Packs/day: 0.50     Years: 1.00    Smokeless tobacco: Never Used    Alcohol use Yes      Comment: Occasionally        Allergies:  No Known Allergies      Review of Systems Review of Systems   Constitutional: Negative for activity change, appetite change, fever and unexpected weight change. HENT: Negative for congestion and sore throat. Eyes: Negative for pain and redness. Respiratory: Negative for cough and shortness of breath. Cardiovascular: Negative for chest pain and palpitations. Gastrointestinal: Negative for abdominal pain, diarrhea, nausea and vomiting. Endocrine: Negative for polydipsia and polyuria. Genitourinary: Positive for flank pain. Negative for difficulty urinating and dysuria. Musculoskeletal: Positive for back pain. Negative for neck pain. Skin: Negative for pallor and rash. Neurological: Negative for headaches. All other systems reviewed and are negative. Physical Exam     Vitals:    03/11/18 1905   BP: (!) 148/127   Pulse: (!) 118   Resp: 20   Temp: 98.7 °F (37.1 °C)   SpO2: 100%   Weight: 52.2 kg (115 lb)   Height: 4' 11\" (1.499 m)     Physical Exam   Constitutional: She is oriented to person, place, and time. She appears well-developed and well-nourished. HENT:   Head: Normocephalic and atraumatic. Right Ear: External ear normal.   Left Ear: External ear normal.   Nose: Nose normal.   Mouth/Throat: Oropharynx is clear and moist.   Eyes: Conjunctivae and EOM are normal. Pupils are equal, round, and reactive to light. Neck: Normal range of motion. Neck supple. No JVD present. No tracheal deviation present. Cardiovascular: Normal heart sounds and intact distal pulses. Exam reveals no gallop and no friction rub. No murmur heard. Fast rate and rhythm   Pulmonary/Chest: Effort normal and breath sounds normal. No respiratory distress. She has no wheezes. She has no rales. Abdominal: Soft. Bowel sounds are normal. She exhibits no distension and no mass. There is tenderness (left flank). There is no rebound and no guarding. Musculoskeletal: Normal range of motion. She exhibits no edema or tenderness.    Neurological: She is alert and oriented to person, place, and time. She has normal reflexes. No cranial nerve deficit. Skin: Skin is warm and dry. No rash noted. Psychiatric: She has a normal mood and affect. Her behavior is normal.   Nursing note and vitals reviewed. Diagnostic Study Results     Labs -     Recent Results (from the past 12 hour(s))   DRUG SCREEN, URINE    Collection Time: 03/11/18  7:45 PM   Result Value Ref Range    BENZODIAZEPINES NEGATIVE  NEG      BARBITURATES NEGATIVE  NEG      THC (TH-CANNABINOL) NEGATIVE  NEG      OPIATES NEGATIVE  NEG      PCP(PHENCYCLIDINE) NEGATIVE  NEG      COCAINE POSITIVE (A) NEG      AMPHETAMINES NEGATIVE  NEG      METHADONE NEGATIVE  NEG      HDSCOM (NOTE)    CBC WITH AUTOMATED DIFF    Collection Time: 03/11/18  7:50 PM   Result Value Ref Range    WBC 24.2 (H) 4.6 - 13.2 K/uL    RBC 4.61 4.20 - 5.30 M/uL    HGB 13.3 12.0 - 16.0 g/dL    HCT 39.2 35.0 - 45.0 %    MCV 85.0 74.0 - 97.0 FL    MCH 28.9 24.0 - 34.0 PG    MCHC 33.9 31.0 - 37.0 g/dL    RDW 15.8 (H) 11.6 - 14.5 %    PLATELET 699 (H) 253 - 420 K/uL    MPV 10.9 9.2 - 11.8 FL    NEUTROPHILS 92 (H) 42 - 75 %    LYMPHOCYTES 5 (L) 20 - 51 %    MONOCYTES 3 2 - 9 %    EOSINOPHILS 0 0 - 5 %    BASOPHILS 0 0 - 3 %    ABS. NEUTROPHILS 22.3 (H) 1.8 - 8.0 K/UL    ABS. LYMPHOCYTES 1.2 0.8 - 3.5 K/UL    ABS. MONOCYTES 0.7 0 - 1.0 K/UL    ABS. EOSINOPHILS 0.0 0.0 - 0.4 K/UL    ABS.  BASOPHILS 0.0 0.0 - 0.1 K/UL    RBC COMMENTS NORMOCYTIC, NORMOCHROMIC      DF MANUAL     METABOLIC PANEL, COMPREHENSIVE    Collection Time: 03/11/18  7:50 PM   Result Value Ref Range    Sodium 138 136 - 145 mmol/L    Potassium 3.4 (L) 3.5 - 5.5 mmol/L    Chloride 98 (L) 100 - 108 mmol/L    CO2 24 21 - 32 mmol/L    Anion gap 16 3.0 - 18 mmol/L    Glucose 127 (H) 74 - 99 mg/dL    BUN 17 7.0 - 18 MG/DL    Creatinine 1.03 0.6 - 1.3 MG/DL    BUN/Creatinine ratio 17 12 - 20      GFR est AA >60 >60 ml/min/1.73m2    GFR est non-AA >60 >60 ml/min/1.73m2    Calcium 10.1 8.5 - 10.1 MG/DL    Bilirubin, total 0.5 0.2 - 1.0 MG/DL    ALT (SGPT) 48 13 - 56 U/L    AST (SGOT) 26 15 - 37 U/L    Alk. phosphatase 55 45 - 117 U/L    Protein, total 8.8 (H) 6.4 - 8.2 g/dL    Albumin 4.2 3.4 - 5.0 g/dL    Globulin 4.6 (H) 2.0 - 4.0 g/dL    A-G Ratio 0.9 0.8 - 1.7     LIPASE    Collection Time: 03/11/18  7:50 PM   Result Value Ref Range    Lipase 84 73 - 393 U/L   URINALYSIS W/ RFLX MICROSCOPIC    Collection Time: 03/11/18  9:00 PM   Result Value Ref Range    Color DARK YELLOW      Appearance CLOUDY      Specific gravity >1.030 (H) 1.005 - 1.030    pH (UA) 5.5 5.0 - 8.0      Protein 30 (A) NEG mg/dL    Glucose NEGATIVE  NEG mg/dL    Ketone 40 (A) NEG mg/dL    Bilirubin NEGATIVE  NEG      Blood NEGATIVE  NEG      Urobilinogen 1.0 0.2 - 1.0 EU/dL    Nitrites NEGATIVE  NEG      Leukocyte Esterase NEGATIVE  NEG     URINE MICROSCOPIC ONLY    Collection Time: 03/11/18  9:00 PM   Result Value Ref Range    WBC 1 to 3 0 - 5 /hpf    RBC 0 to 3 0 - 5 /hpf    Epithelial cells 2+ 0 - 5 /lpf    Bacteria 1+ (A) NEG /hpf   LACTIC ACID    Collection Time: 03/11/18  9:37 PM   Result Value Ref Range    Lactic acid 2.6 (HH) 0.4 - 2.0 MMOL/L   POC LACTIC ACID    Collection Time: 03/11/18  9:46 PM   Result Value Ref Range    Lactic Acid (POC) 2.5 (HH) 0.4 - 2.0 mmol/L       Radiologic Studies -    US ABD LTD   Final Result   IMPRESSION:     No sonographic abnormalities are identified.     As read by the radiologist.   XR CHEST PORT    (Results Pending)     11:25 PM  RADIOLOGY FINDINGS  Chest X-ray shows NAP  Pending review by Radiologist  Recorded by [employer], ED Scribe, as dictated by Brandie Pickering MD    CT Results  (Last 48 hours)    None        CXR Results  (Last 48 hours)    None          MEDICATIONS GIVEN:  Medications   sodium chloride (NS) flush 5-10 mL (not administered)   piperacillin-tazobactam (ZOSYN) 4.5 g in  ml premix/cpmd (4.5 g IntraVENous Given 3/11/18 4894)   sodium chloride 0.9 % bolus infusion 1,000 mL (0 mL IntraVENous IV Completed 3/11/18 2245)   promethazine (PHENERGAN) 12.5 mg in 0.9% sodium chloride 50 mL IVPB (12.5 mg IntraVENous New Bag 3/11/18 2011)   morphine 4 mg (4 mg IntraVENous Given 3/11/18 2146)   sodium chloride 0.9 % bolus infusion 1,566 mL (1,566 mL IntraVENous New Bag 3/11/18 2245)       Medical Decision Making   I am the first provider for this patient. I reviewed the vital signs, available nursing notes, past medical history, past surgical history, family history and social history. Vital Signs-Reviewed the patient's vital signs. Pulse Oximetry Analysis - 100% on RA     Records Reviewed: Nursing Notes    Provider Notes (Medical Decision Making):   Ddx: Kidney stone, pyelonephritis, UTI, dehydration, ovarian cyst, PNA    Procedures:  Bedside US  Date/Time: 3/11/2018 10:26 PM  Performed by: Woody Ryan by: Noemy Muhammad     Verbal consent obtained: Yes    Given by:  Patient  Performed by: Attending  Type of procedure: Focused pelvic ultrasound obstetrical  Indications:  Back pain, pregnant by patient history and abdominal pain  Intrauterine Pregnancy:  Present  Fetal heart    FHR (bpm):  160  Interpretation:  Intrauterine pregnancy            ED Course:   7:29 PM   Initial assessment performed. The patients presenting problems have been discussed, and they are in agreement with the care plan formulated and outlined with them. I have encouraged them to ask questions as they arise throughout their visit. SEPSIS ASSESSMENT NOTE:   9:52 PM  Mercedes Boyer MD has seen and assessed the patient as follows:    Capillary Refill:normal/brisk  Cardiopulmonary Evaluation:   Lung Sounds: clear   Cardiac Sounds:  Tachycardic  Peripheral Pulses: present  Skin Exam: skin unremarkable    Visit Vitals    BP (!) 148/127 (BP 1 Location: Left arm, BP Patient Position: At rest)    Pulse (!) 118    Temp 98.7 °F (37.1 °C)    Resp 20    Ht 4' 11\" (1.499 m)    Wt 52.2 kg (115 lb)    LMP  (LMP Unknown)    SpO2 100%    BMI 23.23 kg/m2       Written by GUADALUPE Monsalve, as dictated by Veto Amos MD    CONSULT NOTE:   9:54 PM  Sujata Morataya MD spoke with Sil Nobles MD  Specialty: Hospitalist  Discussed pt's hx, disposition, and available diagnostic and imaging results. Reviewed care plans. Consulting physician agrees with plans as outlined. Advises to consult OB. Written by GUADALUPE Monsalve, as dictated by Sujata Morataya MD.    CONSULT NOTE:   10:53 PM  Sujata Morataya MD spoke with Kathe Ross MD  Specialty: OB/GYN  Discussed pt's hx, disposition, and available diagnostic and imaging results. Reviewed care plans. Consulting physician agrees with plans as outlined. Will consult pt. Written by GUADALUPE Monsalve, as dictated by Sujata Morataya MD.    CONSULT NOTE:   11:11 PM  Sujata Morataya MD spoke with Sil Nobles MD   Specialty: Hospitalist  Discussed pt's hx, disposition, and available diagnostic and imaging results. Reviewed care plans. Consulting physician agrees with plans as outlined. Agrees to admit pt. Written by GUADALUPE Monsalve, as dictated by Sujata Morataya MD.      Diagnosis and Disposition       Critical Care Time: 30 mins  11:36 PM  I have spent 30 minutes of critical care time involved in lab review, consultations with specialist, family decision-making, and documentation. During this entire length of time I was immediately available to the patient. Critical Care: The reason for providing this level of medical care for this critically ill patient was due a critical illness that impaired one or more vital organ systems such that there was a high probability of imminent or life threatening deterioration in the patients condition.  This care involved high complexity decision making to assess, manipulate, and support vital system functions, to treat this degree vital organ system failure and to prevent further life threatening deterioration of the patients condition. Core Measures:  For Hospitalized Patients:    1. Hospitalization Decision Time:  The decision to hospitalize the patient was made by Jazmine Graves MD at 11:16 PM on 3/11/2018    2. Aspirin: Aspirin was not given because the patient did not present with a stroke at the time of their Emergency Department evaluation    11:16 PM  Patient is being admitted to the hospital by Christian Ansari MD. The results of their tests and reasons for their admission have been discussed with them and/or available family. They convey agreement and understanding for the need to be admitted and for their admission diagnosis. CONDITIONS ON ADMISSION:  Sepsis is not present at the time of admission. Deep Vein Thrombosis is not present at the time of admission. Thrombosis is not present at the time of admission. Urinary Tract Infection is not present at the time of admission. Pneumonia is not present at the time of admission. MRSA is not present at the time of admission. Wound infection is not present at the time of admission. Pressure Ulcer is not present at the time of admission. Discussion:   Patient presented with c/o left flank pain in ED. Pt. was given morphine and IV normal saline hydration with improvement. UA was unremarkable. US of retroperitoneum showed suboptimally distended bladder, neither ureteral jet was identified but, no findings of hydronephrosis. Bedside US by me showed IUP with rate of 160. CXR was unremarkable. Labs were remarkable for leukocytosis and elevated lactic acid. Pt was treated per sepsis protocol with 30 cc fluid bolus. No source of infection was found. Pt was given Zosyn IV. UDS was positive for cocaine. The cause of leukocytosis was unclear, though likely from drug use. Pt is nontoxic appearing. Case discussed with hospitalist who agrees with admission. Case discussed with GYN who agrees with consult pt.  Noting similar episode back in February with drug use, no evidence of underlying infection was found. I doubt sepsis at this time. Pt also shows drug seeking behavior; she is requesting narcotic pain medication. CLINICAL IMPRESSION:    1. Left flank pain    2. 11 weeks gestation of pregnancy    3. Non-intractable vomiting with nausea, unspecified vomiting type    4. Leukocytosis, unspecified type    5. Elevated lactic acid level    6. Dehydration    7. Cocaine abuse    8. Chronic hepatitis C without hepatic coma (HCC)        PLAN: Admit to Remote Tele  _______________________________    Attestations: This note is prepared by Ninfa Fulton, acting as Scribe for Radha Jaramillo MD.    Radha Jaramillo MD:  The scribe's documentation has been prepared under my direction and personally reviewed by me in its entirety.   I confirm that the note above accurately reflects all work, treatment, procedures, and medical decision making performed by me.  _______________________________

## 2018-03-12 NOTE — DISCHARGE SUMMARY
Discharge Summary         Assesment:        Patient Active Problem List   Diagnosis Code    Substance abuse F19.10    Seizure ([facility] [address] R56.9    Intractable nausea and vomiting R11.2    Acute pyelonephritis N10    Anxiety F41.9    Dehydration E86.0    Hypokalemia E87.6    Acute renal injury ([facility] [address] N17.9    Pyelonephritis affecting pregnancy in first trimester O23.01    5 weeks gestation of pregnancy Z3A.01    Dysthymic disorder F34.1    Personality disorder F60.9    Leukocytosis D72.829         Plan: Discharge home today with: Medications: Phenergan Follow up: 1-2 weeks with her OBGYN provider Diet: BRAT diet Activity: as tolerated  Patient was admitted for hyperemesis unresponsive to PO medication therapy. She with no new complaints and reports no emesis this AM. Pain controlled on current medication. Voiding without difficulty. Patient informed of plan for discharge once her hyopkalemia is corrected. Patient will need to have close f/u care with her new OBGYN provider. Patient made aware that she may need continued assistance if her HEG symptoms persist.  Patient denies any VB or cramping at this time.     Orders/Charges: Low            Current Facility-Administered Medications   Medication Dose Route Frequency    potassium chloride (K-DUR, KLOR-CON) SR tablet 40 mEq  40 mEq Oral ONCE    potassium chloride (K-DUR, KLOR-CON) SR tablet 40 mEq  40 mEq Oral ONCE    magnesium sulfate 2 g/50 ml IVPB (premix or compounded)  2 g IntraVENous ONCE    0.9% sodium chloride with KCl 20 mEq/L infusion    IntraVENous CONTINUOUS    piperacillin-tazobactam (ZOSYN) 3.375 g IVPB (premix)  3.375 g IntraVENous Q6H    potassium chloride (K-DUR, KLOR-CON) SR tablet 40 mEq  40 mEq Oral NOW    sodium chloride (NS) flush 5-10 mL  5-10 mL IntraVENous Q8H    0.9% sodium chloride 1,000 mL with mvi, adult no. 4 with vit K 10 mL, thiamine 766 mg, folic acid 1 mg, potassium chloride 40 mEq infusion    IntraVENous Q24H    nicotine (NICODERM CQ) 21 mg/24 hr patch 1 Patch  1 Patch TransDERmal DAILY         Vitals:       Visit Vitals    BP 93/60 (BP 1 Location: Left arm, BP Patient Position: At rest)    Pulse 81    Temp 98.1 °F (36.7 °C)    Resp 16    Ht 4' 11\" (1.499 m)    Wt 49.9 kg (109 lb 14.4 oz)    LMP  (LMP Unknown)    SpO2 100%    BMI 22.2 kg/m2      Temp (24hrs), Av.9 °F (36.6 °C), Min:97.8 °F (36.6 °C), Max:98.1 °F (36.7 °C)        Last 24hr Input/Output:     Intake/Output Summary (Last 24 hours) at 18 1157  Last data filed at 18 0645    Gross per 24 hour   Intake             1800 ml   Output              100 ml   Net             1700 ml            Exam:  General: alert, cooperative, no distress, appears stated age                          Lung: clear to auscultation bilaterally                          Heart: regular rate and rhythm, S1, S2 normal, no murmur, click, rub or gallop                          Abdomen: abdomen is soft without significant tenderness, masses, organomegaly or guarding                          Extremities: extremities normal, atraumatic, no cyanosis or edema              Labs: Lab Results   Component Value Date/Time     WBC 11.7 2018 04:40 AM     WBC 26.3 2018 02:41 AM     WBC 20.1 2018 12:32 PM     HGB 10.2 2018 04:40 AM     HGB 11.8 2018 02:41 AM     HGB 13.7 2018 12:32 PM     HCT 30.8 2018 04:40 AM     HCT 35.9 2018 02:41 AM     HCT 40.6 2018 12:32 PM     PLATELET 778  04:40 AM     PLATELET 256 10/73/8917 02:41 AM     PLATELET 690  12:32 PM

## 2018-03-12 NOTE — ED NOTES
Pt transported to the floor via stretcher by this RN/accompanied by Security with safely. Pt has her personal belongings. Security taking her  to her .

## 2018-03-12 NOTE — ROUTINE PROCESS
Bedside and Verbal shift change report given to RANDY Waters (oncoming nurse) by CHAZ Grewal RN (offgoing nurse). Report included the following information SBAR, Kardex, Intake/Output and MAR.

## 2018-03-12 NOTE — PROGRESS NOTES
Hospitalist Progress Note    Patient: Derrell Brewer MRN: [id number]  CSN: [id number]    YOB: 1985  Age: 29 y.o. Sex: female    DOA: 3/11/2018 LOS:  LOS: 1 day          Chief Complaint:    Flank Pain    Assessment/Plan     1. Leukocytosis  2. Hypokalemia  3. Lactic Acidosis  4. Personality Disorder  5. Substance Abuse  6. Pregnancy  7. Flank Pain  8. HCV    1. Workup underway. Echo complete with no obvious vegetations. Continue IV abx, monitor trend. UA clear, no CVA tenderness. Continue IVF rehydration. 2. K replacement. Will monitor response. Check Mg in AM.   3. Normal as of this AM at 1.7.  4. Seen and evaluated by Dr Keo Mendez last admission, recommended medications, but also stated meds should be held until after 12 weeks gestation. 5. Cocaine positive UDS. 6. Patient states she has an appointment on March 15 to \"get things in motion\" for her planned . 7. Lidocaine patch, heating pad prn. Due to persistent nausea/vomiting, phenergan was switched to IV phenergan in same dose of 12.5mg q4h prn. DVT Prophylaxis - Lovenox  Dispo: If improvement in leukocytosis, cultures remain negative, may in coming days. Patient Active Problem List   Diagnosis Code    Substance abuse F19.10    Seizure ([address] R56.9    Intractable nausea and vomiting R11.2    Acute pyelonephritis N10    Anxiety F41.9    Dehydration E86.0    Hypokalemia E87.6    Acute renal injury ([address] N17.9    Pyelonephritis affecting pregnancy in first trimester O23.01    5 weeks gestation of pregnancy Z3A.01    Dysthymic disorder F34.1    Personality disorder F60.9    Leukocytosis D72.829    Pregnancy Z34.90    Cocaine abuse F14.10    Flank pain R10.9    Elevated lactic acid level R79.89    Smoker F17.200    Hepatitis C B19.20       Subjective:    Complaints of nausea/vomiting, flank pain, anxiety. States she takes 50mg of phenergan at home and 12.5mg is not helping.      Review of systems:    Constitutional: denies fevers, chills, myalgias  Respiratory: denies SOB, cough  Cardiovascular: denies chest pain, palpitations  Gastrointestinal: +nausea/vomiting, denies diarrhea      Vital signs/Intake and Output:  Visit Vitals    BP (!) 88/52 (BP 1 Location: Left arm, BP Patient Position: At rest)    Pulse 82    Temp 98.8 °F (37.1 °C)    Resp 20    Ht 4' 11\" (1.499 m)    Wt 52.2 kg (115 lb)    SpO2 100%    BMI 23.23 kg/m2     Current Shift:  03/12 0701 - 03/12 1900  In: -   Out: 150 [Urine:150]  Last three shifts:  03/10 1901 - 03/12 0700  In: -   Out: 400 [Urine:200]    Exam:    General: Well developed, alert, NAD, OX3  Head/Neck: NCAT, supple, No masses, No lymphadenopathy  CVS:Regular rate and rhythm, no M/R/G, S1/S2 heard, no thrill  Lungs:Clear to auscultation bilaterally, no wheezes, rhonchi, or rales  Abdomen: Soft, Nontender, No distention, Normal Bowel sounds, No hepatomegaly  Extremities: No C/C/E, pulses palpable 2+  Skin:normal texture and turgor, no rashes, no lesions  Neuro:grossly normal , follows commands  Psych:appropriate                Labs: Results:       Chemistry Recent Labs      03/11/18 1950   GLU  127*   NA  138   K  3.4*   CL  98*   CO2  24   BUN  17   CREA  1.03   CA  10.1   AGAP  16   BUCR  17   AP  55   TP  8.8*   ALB  4.2   GLOB  4.6*   AGRAT  0.9      CBC w/Diff Recent Labs      03/11/18 1950   WBC  24.2*   RBC  4.61   HGB  13.3   HCT  39.2   PLT  437*   GRANS  92*   LYMPH  5*   EOS  0      Cardiac Enzymes No results for input(s): CPK, CKND1, NAEEM in the last 72 hours. No lab exists for component: CKRMB, TROIP   Coagulation No results for input(s): PTP, INR, APTT in the last 72 hours. No lab exists for component: INREXT    Lipid Panel No results found for: CHOL, CHOLPOCT, CHOLX, CHLST, CHOLV, 018610, HDL, LDL, LDLC, DLDLP, 883849, VLDLC, VLDL, TGLX, TRIGL, TRIGP, TGLPOCT, CHHD, CHHDX   BNP No results for input(s): BNPP in the last 72 hours.    Liver Enzymes Recent Labs      03/11/18 1950   TP  8.8*   ALB  4.2   AP  55   SGOT  26      Thyroid Studies No results found for: T4, T3U, TSH, TSHEXT     Procedures/imaging: see electronic medical records for all procedures/Xrays and details which were not copied into this note but were reviewed prior to creation of 2050 Isela Braga, PA-C

## 2018-03-12 NOTE — PROGRESS NOTES
Shift summary: Pt A&Ox4, pt anxious and agitated, but was able to calm patient down. Playing soothing music in the room and offered back massage to patient. Critical result reported from lab- lactic acid 3.1, Dr. Aleena Elias made aware. Received orders to give a 500cc bolus of normal saline.

## 2018-03-12 NOTE — H&P
History & Physical    Patient: Monse Medina MRN: [id number]  CSN: [id number]    YOB: 1985  Age: 29 y.o. Sex: female      DOA: 3/11/2018  Primary Care Provider:  None      Assessment/Plan     Hospital Problems  Date Reviewed: 2015          Codes Class Noted POA    Pregnancy ICD-10-CM: Z34.90  ICD-9-CM: V22.2  3/11/2018 Unknown        Cocaine abuse ICD-10-CM: F14.10  ICD-9-CM: 305.60  3/11/2018 Unknown        Flank pain ICD-10-CM: R10.9  ICD-9-CM: 789.09  3/11/2018 Unknown        Elevated lactic acid level ICD-10-CM: R79.89  ICD-9-CM: 276.2  3/11/2018 Unknown        Leukocytosis ICD-10-CM: D72.829  ICD-9-CM: 288.60  2018 Unknown        Dysthymic disorder ICD-10-CM: F34.1  ICD-9-CM: 300.4  2018 Yes        Personality disorder ICD-10-CM: F60.9  ICD-9-CM: 301.9  2018 Yes    Overview Signed 2018  3:55 PM by Rakan Whelan MD     (borderline and antisocial personality disorder). Dehydration ICD-10-CM: E86.0  ICD-9-CM: 276.51  2018 Yes                Admit to medical     1 Leukocytosis   So far unknown etiology  Will have echo to r/o endocarditis due to hx of heroin use   Ua clear, no CVA tenderness on exam   Will empiric treat with abx      2 Hypokalemia   k replacement,  will check mg         4 lactic acidosis   Not like sepsis, dehydration -most likely   Will continue hydration     4 personal  Disorder  Need f/u with [facility], was evaluated per psych last admission       5 substance abuse   Cocaine+. Ativan ,clonidine prn,     6 pregnancy   Reported will have  on    7flank pain   Lidocaine patch     8 HCV    Full code   DVT : lovenox. ppi proph  CC: flank pain        HPI:     Monse Medina is a 29 y.o. female who hx of cocaine abuse, personality disorder , preg 11 wks , came to ed due to flank pain. She was treated pyelo-which ultrasound wnl and urine cx no growth last admission.  She is at 11 wks preg and reported that she will have scheduled  next week. Reported chills 21 times today, no fever. Reported tylenol aspirin not work for flank pain. UA clear, cxr clear. Lactic acid 2.6, wbc 24. 2. uds cocaine +. Denies any slurred speech/headache/cp/n/v/blurred vission/d/c/palpitation/gait change/bleeding. Threatening to leave the hospital if just giving tylenol for pain. Discussed with pt about the treatment plan and discussed with patient about the potential affect her baby per medication we are using for treatment. She states\" just treat me like I am  not pregnant\" . Visit Vitals    /87 (BP 1 Location: Left arm, BP Patient Position: At rest)    Pulse 82    Temp 98.5 °F (36.9 °C)    Resp 24    Ht 4' 11\" (1.499 m)    Wt 52.2 kg (115 lb)    LMP  (LMP Unknown)    SpO2 100%    BMI 23.23 kg/m2      O2 Device: Room air      Past Medical History:   Diagnosis Date    Anxiety     Asthma     Depression     Drug use     Endometriosis     Hepatitis C     Heroin abuse     Kidney infection        Past Surgical History:   Procedure Laterality Date    HX DILATION AND CURETTAGE      HX LEEP PROCEDURE         History reviewed. No pertinent family history. Social History     Social History    Marital status: SINGLE     Spouse name: N/A    Number of children: N/A    Years of education: N/A     Social History Main Topics    Smoking status: Current Every Day Smoker     Packs/day: 0.50     Years: 1.00    Smokeless tobacco: Never Used    Alcohol use Yes      Comment: Occasionally     Drug use: Yes     Special: Other, Heroin      Comment: spice    Sexual activity: Yes     Birth control/ protection: None     Other Topics Concern    None     Social History Narrative    ** Merged History Encounter **            Prior to Admission medications    Not on File       No Known Allergies    Review of Systems  Gen: No fever,+ chills, malaise, no weight loss/gain.    Heent: No headache, rhinorrhea, epistaxis, ear pain, hearing loss, sinus pain, neck pain/stiffness, sore throat. Heart: No chest pain, palpitations, DONG, pnd, or orthopnea. Resp: No cough, hemoptysis, wheezing and shortness of breath. GI: +nausea, vomiting, no diarrhea, constipation, melena or hematochezia. : No urinary obstruction, dysuria or hematuria. Flank pain   Derm: No rash, new skin lesion or pruritis. Musc/skeletal: no bone or joint complains. Vasc: No edema, cyanosis or claudication. Endo: No heat/cold intolerance, no polyuria,polydipsia or polyphagia. Neuro: No unilateral weakness, numbness, tingling. No seizures. Heme: No easy bruising or bleeding. Physical Exam:     Physical Exam:  Visit Vitals    /87 (BP 1 Location: Left arm, BP Patient Position: At rest)    Pulse 82    Temp 98.5 °F (36.9 °C)    Resp 24    Ht 4' 11\" (1.499 m)    Wt 52.2 kg (115 lb)    LMP  (LMP Unknown)    SpO2 100%    BMI 23.23 kg/m2      O2 Device: Room air    Temp (24hrs), Av.6 °F (37 °C), Min:98.5 °F (36.9 °C), Max:98.7 °F (37.1 °C)             General:  Awake, cooperative, no distress. Head:  Normocephalic, without obvious abnormality, atraumatic. Eyes:  Conjunctivae/corneas clear, sclera anicteric, PERRL, EOMs intact. Nose: Nares normal. No drainage or sinus tenderness. Throat: Lips, mucosa, and tongue normal. .   Neck: Supple, symmetrical, trachea midline, no adenopathy. Lungs:   Clear to auscultation bilaterally. Heart:  Regular rate and rhythm, S1, S2 normal, no murmur, click, rub or gallop. Abdomen: Soft, non-tender. Bowel sounds normal. No masses,  No organomegaly. No cva tenderness    Extremities: Extremities normal, atraumatic, no cyanosis or edema. Pulses: 2+ and symmetric all extremities. Skin: Skin color-pink, texture, turgor normal. No rashes or lesions. Capillary refill normal    Neurologic: CNII-XII intact. No focal motor or sensory deficit.        Labs Reviewed:    BMP:   Lab Results   Component Value Date/Time     03/11/2018 07:50 PM    K 3.4 (L) 03/11/2018 07:50 PM    CL 98 (L) 03/11/2018 07:50 PM    CO2 24 03/11/2018 07:50 PM    AGAP 16 03/11/2018 07:50 PM     (H) 03/11/2018 07:50 PM    BUN 17 03/11/2018 07:50 PM    CREA 1.03 03/11/2018 07:50 PM    GFRAA >60 03/11/2018 07:50 PM    GFRNA >60 03/11/2018 07:50 PM     CMP:   Lab Results   Component Value Date/Time     03/11/2018 07:50 PM    K 3.4 (L) 03/11/2018 07:50 PM    CL 98 (L) 03/11/2018 07:50 PM    CO2 24 03/11/2018 07:50 PM    AGAP 16 03/11/2018 07:50 PM     (H) 03/11/2018 07:50 PM    BUN 17 03/11/2018 07:50 PM    CREA 1.03 03/11/2018 07:50 PM    GFRAA >60 03/11/2018 07:50 PM    GFRNA >60 03/11/2018 07:50 PM    CA 10.1 03/11/2018 07:50 PM    ALB 4.2 03/11/2018 07:50 PM    TP 8.8 (H) 03/11/2018 07:50 PM    GLOB 4.6 (H) 03/11/2018 07:50 PM    AGRAT 0.9 03/11/2018 07:50 PM    SGOT 26 03/11/2018 07:50 PM    ALT 48 03/11/2018 07:50 PM     CBC:   Lab Results   Component Value Date/Time    WBC 24.2 (H) 03/11/2018 07:50 PM    HGB 13.3 03/11/2018 07:50 PM    HCT 39.2 03/11/2018 07:50 PM     (H) 03/11/2018 07:50 PM     All Cardiac Markers in the last 24 hours: No results found for: CPK, CK, CKMMB, CKMB, RCK3, CKMBT, CKNDX, CKND1, NAEEM, TROPT, TROIQ, HARRY, TROPT, TNIPOC, BNP, BNPP  Recent Glucose Results:   Lab Results   Component Value Date/Time     (H) 03/11/2018 07:50 PM     ABG: No results found for: PH, PHI, PCO2, PCO2I, PO2, PO2I, HCO3, HCO3I, FIO2, FIO2I  COAGS: No results found for: APTT, PTP, INR  Liver Panel:   Lab Results   Component Value Date/Time    ALB 4.2 03/11/2018 07:50 PM    TP 8.8 (H) 03/11/2018 07:50 PM    GLOB 4.6 (H) 03/11/2018 07:50 PM    AGRAT 0.9 03/11/2018 07:50 PM    SGOT 26 03/11/2018 07:50 PM    ALT 48 03/11/2018 07:50 PM    AP 55 03/11/2018 07:50 PM     Pancreatic Markers:   Lab Results   Component Value Date/Time    LPSE 84 03/11/2018 07:50 PM       Procedures/imaging: see electronic medical records for all procedures/Xrays and details which were not copied into this note but were reviewed prior to creation of Aaorn Rondon MD, Internal Medicine     CC: None

## 2018-03-12 NOTE — ED NOTES
Pt yelling, screaming, as she was upon arrival.  Pt stating , \"I can't help it, the pump is setting me off. \"  Pt is being admitted to the hospital.  Zosyn complete.

## 2018-03-12 NOTE — ROUTINE PROCESS
Went in room with Dr. Rebecca Saunders for rounds. MD spoke with pt, discussed antibx and pending cultures for appropriate antibx. Pt states \"what about my pain. \" MD states he will review medication orders. Pt becomes upset, begins to throw call light and telephone. Begins to cry. This nurse has discussed with pt and MD numerous medications ordered that are available to pt, she continues to cry and throw belongings in bed. MD repeats he will review orders. Pt states \"I need to go somewhere that will help me. \" Pt informed that she may leave AMA if she wishes to leave. Nurse asks if she would like to leave, pt crosses arms and turns body away from MD and nurse.

## 2018-03-12 NOTE — ED NOTES
Accompanied Dr Doyce Baumgarten to perform the US. Pt much calmer, cooperative. No vomiting, 6/10 pain relief of the LT flank pain.

## 2018-03-12 NOTE — PROGRESS NOTES
Pt has been readmitted for flank pain. Pt was previously admitted with pyelo and was discharged with multiple resources ([facility] follow up, [facility] pregnancy centers, [facility], and [employer]  and medications assistance through the [address]. Pt is pregnant with a history of cocaine abuse, personality disorder, and HCV. Pt indicated she is planning to end her pregnancy 3/15/18. Pt is uninsured and was provided assistance through the [address] during her previous admission. Given this, pt will not qualify for medication assistance through the [address] this admission. Anticipate this pt will be discharged with in the next 24 hours. CM remains available. Discharge Reassessment Plan:  Low Risk    RRAT Score:  1 - 12     Low Risk Care Transition Interventions:  1. Discharge transition plan:  Physician and [facility] follow up   2. Involved patient/caregiver in assessment, planning, education and implement of intervention. 3. CM daily patient care huddles/interdisciplinary rounds were completed. 4. PCP/Specialist appointment within 5 days made prior to discharge. Date/Time  5. Facilitated transportation and logistics for follow-up appointments. 6. Handoff to [facility] Nurse Navigator or PCP practice. Care Management Interventions  Mode of Transport at Discharge: Other (see comment) (family)  Transition of Care Consult (CM Consult): Discharge Planning  Health Maintenance Reviewed:  Yes

## 2018-03-12 NOTE — PROGRESS NOTES
Pharmacy Dosing Services: Vancomycin    Consult for Vancomycin Dosing by Pharmacy by Dr. Willis Loza provided for this 29y.o. year old female , for indication of leukocytosis. Day of Therapy 1    Ht Readings from Last 1 Encounters:   03/11/18 149.9 cm (59\")        Wt Readings from Last 1 Encounters:   03/11/18 52.2 kg (115 lb)        Other Current Antibiotics Zosyn 4.5gm IV q 6 hr   Significant Cultures pending   Serum Creatinine Lab Results   Component Value Date/Time    Creatinine 1.03 03/11/2018 07:50 PM      Creatinine Clearance Estimated Creatinine Clearance: 61.5 mL/min (based on Cr of 1.03). BUN Lab Results   Component Value Date/Time    BUN 17 03/11/2018 07:50 PM      WBC Lab Results   Component Value Date/Time    WBC 24.2 (H) 03/11/2018 07:50 PM      H/H Lab Results   Component Value Date/Time    HGB 13.3 03/11/2018 07:50 PM      Platelets Lab Results   Component Value Date/Time    PLATELET 439 (H) 40/59/4879 07:50 PM      Temp 98.5 °F (36.9 °C)     Start Vancomycin therapy, with loading dose of 1250 (mg) at 0200/03/12/2018). Follow with maintenance dose of 1250(mg) at 0200/ 03/14/2018 (time/date), every 24 hours (frequency). Dose calculated to approximate a therapeutic trough of 15-20mcg/mL. Pharmacy to follow daily and will make changes to dose and/or frequency based on clinical status. Estimated Pharmacokinetic Parameters (based on population kinetics)  Vd: 37 L (0.7 L/kg)   Bacilio: 0.046 hr-1 (T1/2 = 15.1 hrs)     Dosing Recommendations   Vancomycin dose: 1250 mg IV Q24hrs (infused over 1 hr)   Estimated peak: 50.1 mcg/mL   Estimated trough: 17.4 mcg/mL   Estimated AUC:ANN: 744 mcg*hr/mL (assumed ANN 1 mcg/mL)     A/P:   1. Recommend vancomycin 1250 mg IV Q24hrs (24 mg/kg)   2. Consider a vancomycin trough level prior to the 4th dose. 3. Please monitor renal function (urine output, BUN/SCr). Dose adjustments may be necessary with a significant change in renal function.    4. vancomycin loading dose of 1250 mg to facilitate rapid attainment of target trough serum vancomycin levels.      Pharmacist Emma Phelps, PHARMD

## 2018-03-13 NOTE — ROUTINE PROCESS
Bedside shift change report given to Bhupendra Davis RN (oncoming nurse) by Obed Oconnor RN (offgoing nurse). Report included the following information SBAR, Kardex, Procedure Summary, Intake/Output, MAR, Recent Results and Med Rec Status.

## 2018-03-13 NOTE — PROGRESS NOTES
Dr. Nanda Hurst at bedside at start of shift to discuss with patient about wanting to leave AMA. Patient given IV morphine and ativan, patient seemed to be somewhat more relaxed afterwards. Plan of care discussed, patient in agreement to staying overnight. Patient later approached nurses at nurses station claiming that she hasn't been getting the right dosages of her medications here at the hospital. Patient agitated and tearful, stating she wants to leave AMA and someone is coming to pick her up now. Patient escorted to room. Patient began throwing belongings around and verbally aggressive. Security and nursing supervisor at bedside. Dr. Nanda Hurst notified about patient leaving AMA. AMA paperwork signed by patient, this RN, and MD. Suzi Ramos removed. Prescription for antibiotic given to patient and informed her to make an appointment with her OB GYN MD as soon as possible for leukocytosis. Patient stated she does not have a gynecologist. Dr. Lyudmila Olivia office number provided as she saw patient during this admission. Patient escorted downstairs by security.

## 2018-03-13 NOTE — ROUTINE PROCESS
Pt noted walking in hallway towards elevator. When questioned, pt turned around and began to pout. Pt walked back to room. While entering room, pt began to throw items into silva and slam door shut. Pt heard shouting expletives and throwing items against wall. Security notified. Pt voiced to security she is unhappy with care here, wants to leave AMA. Dr. Maikol Anaya paged, due to pt condition MD states she will come round on pt to discuss matters with pt.

## 2018-03-13 NOTE — PROGRESS NOTES
Hospitalist Progress Note    Patient: Ray Majano MRN: [id number]  CSN: [id number]    YOB: 1985  Age: 29 y.o. Sex: female    DOA: 3/11/2018 LOS:  LOS: 1 day          I was called by nurse to the floor to review with this patient her desire to leave AMA. She is here for leukocytosis with early pregnancy and has history of psychiatric illness (anxiety, personality disorder), and drug use. I met with her in her room and asked what made her want to check out against the advice of the medical team. She stated she did not think she was getting any real treatmetn and no one was addressing her pain and anxiety. She has ativan Q2H for anxiety which I reminded her of, and that she has received today. She has toradol for pain which she said is like \"expletive\" candy. I said what would help her the most to relax and let us continue her treatment? She said morphine had helped her last night to sleep and have relief of pain. I discussed with her nurse about giving morphine and ativan also spaced apart to help her symptoms. I explained to her the IV antibitoics she is receiving and the repeat blood work planned for the am to recheck her white count. She appeared to have relaxed after this discussion. I then was paged again by nurse who stated she still wants to leave AMA. We will encourage her to stay but considering her past behavioral pattern and unrealistic expectations for her medicines here, I suspect she will still leave AMA. She understands this is risking her life and her treatment is not completed. Patient has decided to leave against medical advice and before completion of their recommended medical course of treatment and testing. I have reviewed with the patient the risks of not completing their treatment course to include worsening illness, organ failure, cardiac arrest, stroke, cardiac arrest, and death.  They understand that by not completing the recommended medical course to include  that they are risking the above serious health problems and worsening of their acute and chronic ailments to increase their risk for further illness and death. We have agreed that the following will be arranged for them upon their leaving of the hospital:  1.PO antibiotics  2.follow up recommended with GYN/OB    And they are instructed to return to the hospital/ER if they develop the following problems:fevers, abd pain, N/V, CP or SOB    This patient's follow-up is instructed as such:see OB ASAP  This patient understands they are signing out of the hospital against medical advice.       Beronica Elkins MD

## 2018-03-17 LAB
BACTERIA SPEC CULT: NORMAL
SERVICE CMNT-IMP: NORMAL

## 2018-04-08 ENCOUNTER — HOSPITAL ENCOUNTER (EMERGENCY)
Age: 33
Discharge: HOME | End: 2018-04-08
Attending: EMERGENCY MEDICINE
Payer: SELF-PAY

## 2018-04-08 VITALS
DIASTOLIC BLOOD PRESSURE: 80 MMHG | HEIGHT: 59 IN | HEART RATE: 56 BPM | SYSTOLIC BLOOD PRESSURE: 142 MMHG | TEMPERATURE: 98.8 F | RESPIRATION RATE: 20 BRPM | BODY MASS INDEX: 21.77 KG/M2 | WEIGHT: 108 LBS | OXYGEN SATURATION: 100 %

## 2018-04-08 DIAGNOSIS — M54.50 ACUTE LOW BACK PAIN WITHOUT SCIATICA, UNSPECIFIED BACK PAIN LATERALITY: Primary | ICD-10-CM

## 2018-04-08 DIAGNOSIS — B19.20 HEPATITIS C VIRUS INFECTION WITHOUT HEPATIC COMA, UNSPECIFIED CHRONICITY: ICD-10-CM

## 2018-04-08 DIAGNOSIS — F41.9 SEVERE ANXIETY: ICD-10-CM

## 2018-04-08 DIAGNOSIS — F60.9 PERSONALITY DISORDER (HCC): ICD-10-CM

## 2018-04-08 PROCEDURE — 96372 THER/PROPH/DIAG INJ SC/IM: CPT

## 2018-04-08 PROCEDURE — 74011250636 HC RX REV CODE- 250/636: Performed by: EMERGENCY MEDICINE

## 2018-04-08 PROCEDURE — 74011250637 HC RX REV CODE- 250/637: Performed by: EMERGENCY MEDICINE

## 2018-04-08 PROCEDURE — 99283 EMERGENCY DEPT VISIT LOW MDM: CPT

## 2018-04-08 RX ORDER — ESCITALOPRAM OXALATE 10 MG/1
10 TABLET ORAL DAILY
Qty: 20 TAB | Refills: 0 | Status: SHIPPED | OUTPATIENT
Start: 2018-04-08 | End: 2019-07-28

## 2018-04-08 RX ORDER — DICYCLOMINE HYDROCHLORIDE 10 MG/1
20 CAPSULE ORAL
Status: COMPLETED | OUTPATIENT
Start: 2018-04-08 | End: 2018-04-08

## 2018-04-08 RX ORDER — METHOCARBAMOL 500 MG/1
1000 TABLET, FILM COATED ORAL 3 TIMES DAILY
Qty: 30 TAB | Refills: 0 | Status: SHIPPED | OUTPATIENT
Start: 2018-04-08 | End: 2019-07-28

## 2018-04-08 RX ORDER — KETOROLAC TROMETHAMINE 30 MG/ML
60 INJECTION, SOLUTION INTRAMUSCULAR; INTRAVENOUS
Status: COMPLETED | OUTPATIENT
Start: 2018-04-08 | End: 2018-04-08

## 2018-04-08 RX ADMIN — DICYCLOMINE HYDROCHLORIDE 20 MG: 10 CAPSULE ORAL at 22:09

## 2018-04-08 RX ADMIN — KETOROLAC TROMETHAMINE 60 MG: 30 INJECTION, SOLUTION INTRAMUSCULAR at 22:09

## 2018-04-09 NOTE — ED PROVIDER NOTES
EMERGENCY DEPARTMENT HISTORY AND PHYSICAL EXAM    Date: 2018  Patient Name: Nayeli Delacruz    History of Presenting Illness     Chief Complaint   Patient presents with    Back Pain    Anxiety         History Provided By: Patient    Chief Complaint: Anxiety attack  Duration: 1 week   Timing:  Worsening  Location: N/A  Severity: Severe  Associated Symptoms: severe left sided back pain x 1 week, nausea, vomiting, and decreased appetite x 3 days    Additional History (Context):   9:43 PM  Nayeli Delacruz is a 29 y.o. female with PMHx of endometriosis, anxiety, and asthma who presents ambulatory to the emergency department C/O severe anxiety attack, onset 1 week. Associated sxs include severe left sided back pain x 1 week, nausea, vomiting, and decreased appetite x 3 days. Notes no relief with ice, ibuprofen, or Midol. Pt was seen at [facility] 2 days ago for the same and was prescribed ibuprofen 600 mg. She reports her sxs have been worsening. Pt does not take any anxiety medication but has in the past. Of note, pt had an  3 weeks ago. Pt does not have insurance and does not have a PCP. Pt denies any other sxs or complaints. PCP: None    Past History     Past Medical History:  Past Medical History:   Diagnosis Date    Anxiety     Asthma     Depression     Drug use     Endometriosis     Hepatitis C     Heroin abuse     Kidney infection        Past Surgical History:  Past Surgical History:   Procedure Laterality Date    HX DILATION AND CURETTAGE      HX LEEP PROCEDURE         Family History:  History reviewed. No pertinent family history.     Social History:  Social History   Substance Use Topics    Smoking status: Current Every Day Smoker     Packs/day: 0.50     Years: 1.00    Smokeless tobacco: Never Used    Alcohol use Yes      Comment: Occasionally        Allergies:  No Known Allergies      Review of Systems   Review of Systems   Constitutional: Negative for chills, diaphoresis, fever and unexpected weight change. HENT: Negative for congestion, drooling, ear pain, rhinorrhea, sore throat, tinnitus and trouble swallowing. Eyes: Negative for photophobia, pain, redness and visual disturbance. Respiratory: Negative for cough, choking, chest tightness, shortness of breath, wheezing and stridor. Cardiovascular: Negative for chest pain, palpitations and leg swelling. Gastrointestinal: Positive for nausea and vomiting. Negative for abdominal distention, abdominal pain, anal bleeding, blood in stool, constipation and diarrhea. Genitourinary: Negative for difficulty urinating, dysuria, flank pain, frequency, hematuria and urgency. Musculoskeletal: Positive for back pain. Negative for arthralgias and neck pain. Skin: Negative for color change, rash and wound. Neurological: Negative for dizziness, seizures, syncope, speech difficulty, light-headedness and headaches. Hematological: Does not bruise/bleed easily. Psychiatric/Behavioral: Negative for agitation, behavioral problems, hallucinations, self-injury and suicidal ideas. The patient is nervous/anxious (severe). The patient is not hyperactive. Physical Exam     Vitals:    04/08/18 2122   BP: 142/80   Pulse: (!) 56   Resp: 20   Temp: 98.8 °F (37.1 °C)   SpO2: 100%   Weight: 49 kg (108 lb)   Height: 4' 11\" (1.499 m)     Physical Exam   Constitutional: She is oriented to person, place, and time. She appears well-developed and well-nourished. No distress. Very nervous/anxious appearing, rocking in the bed, crying without tears, screams but immediately stops during coversation to discuss her procedures and medical hx   HENT:   Head: Normocephalic and atraumatic. Right Ear: External ear normal.   Left Ear: External ear normal.   Mouth/Throat: Oropharynx is clear and moist. No oropharyngeal exudate. Eyes: Conjunctivae and EOM are normal. Pupils are equal, round, and reactive to light. No scleral icterus.    No pallor   Neck: Normal range of motion. Neck supple. No JVD present. No tracheal deviation present. No thyromegaly present. Cardiovascular: Normal rate, regular rhythm and normal heart sounds. Pulmonary/Chest: Effort normal and breath sounds normal. No stridor. No respiratory distress. Abdominal: Soft. Bowel sounds are normal. She exhibits no distension. There is no tenderness. There is no rebound and no guarding. Musculoskeletal: Normal range of motion. She exhibits no edema or tenderness. No soft tissue injuries. Back has no erythema. Lymphadenopathy:     She has no cervical adenopathy. Neurological: She is alert and oriented to person, place, and time. She has normal reflexes. No cranial nerve deficit. Coordination normal.   Skin: Skin is warm and dry. No rash noted. She is not diaphoretic. No erythema. Psychiatric: Her behavior is normal. Judgment and thought content normal. Her mood appears anxious. Nursing note and vitals reviewed. Diagnostic Study Results     Labs -   No results found for this or any previous visit (from the past 12 hour(s)). Radiologic Studies -    No orders to display     CT Results  (Last 48 hours)    None        CXR Results  (Last 48 hours)    None            Medical Decision Making   I am the first provider for this patient. I reviewed the vital signs, available nursing notes, past medical history, past surgical history, family history and social history. Vital Signs-Reviewed the patient's vital signs. Pulse Oximetry Analysis - 100% on RA     Records Reviewed: Nursing Notes and Old Medical Records    Provider Notes (Medical Decision Making):   Ddx: Pt with back pain and severe anxiety already with multiple evaluations for the same. US at neighboring hospital show no sign of kidney infection or problems. Retained POC is possible but unlikely.  Willing to start antidepressants for anxiety disorder but unwilling to provide any controlled substances here due to 1) chronic condition, 2) hx of polysubstance abuse, and 3) benzos and narcs already given at neighboring hospital.    Procedures:  Procedures    MEDICATIONS GIVEN:  Medications   ketorolac tromethamine (TORADOL) 60 mg/2 mL injection 60 mg (60 mg IntraMUSCular Given 4/8/18 2209)   dicyclomine (BENTYL) capsule 20 mg (20 mg Oral Given 4/8/18 2209)       ED Course:   9:43 PM   Initial assessment performed. The patients presenting problems have been discussed, and they are in agreement with the care plan formulated and outlined with them. I have encouraged them to ask questions as they arise throughout their visit. Diagnosis and Disposition       DISCHARGE NOTE:  10:07 PM  Bhavesh Alcala's  results have been reviewed with her. She has been counseled regarding her diagnosis, treatment, and plan. She verbally conveys understanding and agreement of the signs, symptoms, diagnosis, treatment and prognosis and additionally agrees to follow up as discussed. She also agrees with the care-plan and conveys that all of her questions have been answered. I have also provided discharge instructions for her that include: educational information regarding their diagnosis and treatment, and list of reasons why they would want to return to the ED prior to their follow-up appointment, should her condition change. She has been provided with education for proper emergency department utilization. CLINICAL IMPRESSION:    1. Acute low back pain without sciatica, unspecified back pain laterality    2. Hepatitis C virus infection without hepatic coma, unspecified chronicity    3. Personality disorder    4. Severe anxiety        PLAN:  1. D/C Home  2. Discharge Medication List as of 4/8/2018 10:06 PM      START taking these medications    Details   escitalopram oxalate (LEXAPRO) 10 mg tablet Take 1 Tab by mouth daily. , Normal, Disp-20 Tab, R-0      methocarbamol (ROBAXIN) 500 mg tablet Take 2 Tabs by mouth three (3) times daily. , Normal, Disp-30 Tab, R-0           3. Follow-up Information     Follow up With Details Comments Contact Info    [facility] Schedule an appointment as soon as possible for a visit in 2 days For primary care follow up [address] [facility], [address] [facility] [address]    [facility] EMERGENCY DEPT  As needed, If symptoms worsen [address] [place] [id number]  [phone]        _______________________________    Attestations: This note is prepared by Blank Ramos, acting as Scribe for Alissa. Amy Monk MD.    Alissa. Amy Monk MD:  The scribe's documentation has been prepared under my direction and personally reviewed by me in its entirety.   I confirm that the note above accurately reflects all work, treatment, procedures, and medical decision making performed by me.  _______________________________

## 2018-04-09 NOTE — ED NOTES
Presented to ED to be evaluated for reported lower back pain x 1 week. Patient reports pain as documented. Patient denies any known injury. Patient reports hx of same. States seen at [facility] 2 days ago and was prescribed motrin 600mg at that time. Patient states pain no better. Patient denies any additional complaints at this time. Patient is noted to be very anxious and is yelling d/t reported pain.

## 2018-04-09 NOTE — ED TRIAGE NOTES
Patient with severe anxiety and back pain for 2 days. Patient states that she is vomiting. Sepsis Screening completed    (  )Patient meets SIRS criteria. ( x )Patient does not meet SIRS criteria.       SIRS Criteria is achieved when two or more of the following are present   Temperature < 96.8°F (36°C) or > 100.9°F (38.3°C)   Heart Rate > 90 beats per minute   Respiratory Rate > 20 breaths per minute   WBC count > 12,000 or <4,000 or > 10% bands

## 2018-04-09 NOTE — DISCHARGE INSTRUCTIONS
Anxiety Disorder: Care Instructions  Your Care Instructions    Anxiety is a normal reaction to stress. Difficult situations can cause you to have symptoms such as sweaty palms and a nervous feeling. In an anxiety disorder, the symptoms are far more severe. Constant worry, muscle tension, trouble sleeping, nausea and diarrhea, and other symptoms can make normal daily activities difficult or impossible. These symptoms may occur for no reason, and they can affect your work, school, or social life. Medicines, counseling, and self-care can all help. Follow-up care is a key part of your treatment and safety. Be sure to make and go to all appointments, and call your doctor if you are having problems. It's also a good idea to know your test results and keep a list of the medicines you take. How can you care for yourself at home? · Take medicines exactly as directed. Call your doctor if you think you are having a problem with your medicine. · Go to your counseling sessions and follow-up appointments. · Recognize and accept your anxiety. Then, when you are in a situation that makes you anxious, say to yourself, \"This is not an emergency. I feel uncomfortable, but I am not in danger. I can keep going even if I feel anxious. \"  · Be kind to your body:  ¨ Relieve tension with exercise or a massage. ¨ Get enough rest.  ¨ Avoid alcohol, caffeine, nicotine, and illegal drugs. They can increase your anxiety level and cause sleep problems. ¨ Learn and do relaxation techniques. See below for more about these techniques. · Engage your mind. Get out and do something you enjoy. Go to a funny movie, or take a walk or hike. Plan your day. Having too much or too little to do can make you anxious. · Keep a record of your symptoms. Discuss your fears with a good friend or family member, or join a support group for people with similar problems. Talking to others sometimes relieves stress.   · Get involved in social groups, or volunteer to help others. Being alone sometimes makes things seem worse than they are. · Get at least 30 minutes of exercise on most days of the week to relieve stress. Walking is a good choice. You also may want to do other activities, such as running, swimming, cycling, or playing tennis or team sports. Relaxation techniques  Do relaxation exercises 10 to 20 minutes a day. You can play soothing, relaxing music while you do them, if you wish. · Tell others in your house that you are going to do your relaxation exercises. Ask them not to disturb you. · Find a comfortable place, away from all distractions and noise. · Lie down on your back, or sit with your back straight. · Focus on your breathing. Make it slow and steady. · Breathe in through your nose. Breathe out through either your nose or mouth. · Breathe deeply, filling up the area between your navel and your rib cage. Breathe so that your belly goes up and down. · Do not hold your breath. · Breathe like this for 5 to 10 minutes. Notice the feeling of calmness throughout your whole body. As you continue to breathe slowly and deeply, relax by doing the following for another 5 to 10 minutes:  · Tighten and relax each muscle group in your body. You can begin at your toes and work your way up to your head. · Imagine your muscle groups relaxing and becoming heavy. · Empty your mind of all thoughts. · Let yourself relax more and more deeply. · Become aware of the state of calmness that surrounds you. · When your relaxation time is over, you can bring yourself back to alertness by moving your fingers and toes and then your hands and feet and then stretching and moving your entire body. Sometimes people fall asleep during relaxation, but they usually wake up shortly afterward. · Always give yourself time to return to full alertness before you drive a car or do anything that might cause an accident if you are not fully alert.  Never play a relaxation tape while you drive a car. When should you call for help? Call 911 anytime you think you may need emergency care. For example, call if:  ? · You feel you cannot stop from hurting yourself or someone else. ? Keep the numbers for these national suicide hotlines: 2-354-561-TALK (7-638.195.3119) and 9-073-STVCPDE (5-579.974.7370). If you or someone you know talks about suicide or feeling hopeless, get help right away. ? Watch closely for changes in your health, and be sure to contact your doctor if:  ? · You have anxiety or fear that affects your life. ? · You have symptoms of anxiety that are new or different from those you had before. Where can you learn more? Go to http://shrutiAnSynpilo.info/. Enter P754 in the search box to learn more about \"Anxiety Disorder: Care Instructions. \"  Current as of: May 12, 2017  Content Version: 11.4  © 5310-6036 [employer]. Care instructions adapted under license by [employer] (which disclaims liability or warranty for this information). If you have questions about a medical condition or this instruction, always ask your healthcare professional. [place] [address] any warranty or liability for your use of this information. Back Pain: Care Instructions  Your Care Instructions    Back pain has many possible causes. It is often related to problems with muscles and ligaments of the back. It may also be related to problems with the nerves, discs, or bones of the back. Moving, lifting, standing, sitting, or sleeping in an awkward way can strain the back. Sometimes you don't notice the injury until later. Arthritis is another common cause of back pain. Although it may hurt a lot, back pain usually improves on its own within several weeks. Most people recover in 12 weeks or less. Using good home treatment and being careful not to stress your back can help you feel better sooner.   Follow-up care is a key part of your treatment and safety. Be sure to make and go to all appointments, and call your doctor if you are having problems. It's also a good idea to know your test results and keep a list of the medicines you take. How can you care for yourself at home? · Sit or lie in positions that are most comfortable and reduce your pain. Try one of these positions when you lie down:  ¨ Lie on your back with your knees bent and supported by large pillows. ¨ Lie on the floor with your legs on the seat of a sofa or chair. Monica Marisabel on your side with your knees and hips bent and a pillow between your legs. ¨ Lie on your stomach if it does not make pain worse. · Do not sit up in bed, and avoid soft couches and twisted positions. Bed rest can help relieve pain at first, but it delays healing. Avoid bed rest after the first day of back pain. · Change positions every 30 minutes. If you must sit for long periods of time, take breaks from sitting. Get up and walk around, or lie in a comfortable position. · Try using a heating pad on a low or medium setting for 15 to 20 minutes every 2 or 3 hours. Try a warm shower in place of one session with the heating pad. · You can also try an ice pack for 10 to 15 minutes every 2 to 3 hours. Put a thin cloth between the ice pack and your skin. · Take pain medicines exactly as directed. ¨ If the doctor gave you a prescription medicine for pain, take it as prescribed. ¨ If you are not taking a prescription pain medicine, ask your doctor if you can take an over-the-counter medicine. · Take short walks several times a day. You can start with 5 to 10 minutes, 3 or 4 times a day, and work up to longer walks. Walk on level surfaces and avoid hills and stairs until your back is better. · Return to work and other activities as soon as you can. Continued rest without activity is usually not good for your back. · To prevent future back pain, do exercises to stretch and strengthen your back and stomach.  Learn how to use good posture, safe lifting techniques, and proper body mechanics. When should you call for help? Call your doctor now or seek immediate medical care if:  ? · You have new or worsening numbness in your legs. ? · You have new or worsening weakness in your legs. (This could make it hard to stand up.)   ? · You lose control of your bladder or bowels. ? Watch closely for changes in your health, and be sure to contact your doctor if:  ? · Your pain gets worse. ? · You are not getting better after 2 weeks. Where can you learn more? Go to http://shruti-pilo.info/. Enter X582 in the search box to learn more about \"Back Pain: Care Instructions. \"  Current as of: March 21, 2017  Content Version: 11.4  © 8245-0880 [employer]. Care instructions adapted under license by [employer] (which disclaims liability or warranty for this information). If you have questions about a medical condition or this instruction, always ask your healthcare professional. [place] [address] any warranty or liability for your use of this information. Learning About Hepatitis C  What is hepatitis C? Hepatitis C is a liver infection. It is caused by the hepatitis C virus. The virus is spread through infected blood and body fluids. Hepatitis C is often spread when a person shares infected needles used to inject illegal drugs. It also can be spread if a person uses a needle that has infected blood on it. This could happen when you get a tattoo or piercing. Or it can happen when you get a shot in some developing countries where they use needles more than once to give shots. In rare cases, a mother with hepatitis C can spread the virus to her baby at birth. Or a health care worker may accidentally be exposed to blood that is infected with hepatitis C. There is a very small risk of getting the virus through sexual contact.  The risk is higher if your sex partner also has HIV or another sexually transmitted infection, or if you have many sex partners. You can't get hepatitis C from casual contact. This is contact such as hugging, kissing, sneezing, coughing, and sharing food or drinks. What happens when you have hepatitis C? Some people who get hepatitis C have it for a short time and then get better. This is called acute hepatitis C. But most people get long-term, or chronic, hepatitis C. This can lead to liver damage as well as cirrhosis, liver cancer, and liver failure. Experts recommend that certain groups of people get tested for the virus. These include people who have signs of liver disease or have ever shared needles while using illegal drugs. Ask your doctor if testing is right for you. You can also buy a home test called a Home Access Hepatitis C Check kit at [facility]. If the test shows that you have been exposed to the virus in the past, be sure to talk to your doctor to find out if you have the virus now. What are the symptoms? Most people who get hepatitis C do not have symptoms at first. Symptoms may include:  · Tiredness. · Headache. · Sore muscles. · Nausea. · Pain in the upper right belly. · Yellowing of your skin and eyes (jaundice). · Dark urine. How can you prevent hepatitis C? There is no vaccine to prevent the disease. Anyone who has hepatitis C can spread the virus to someone else. You can take steps to make infection less likely. · Do not share needles to inject drugs. · Follow safety guidelines if you work in a health care setting. Wear protective gloves and clothing. Dispose of needles and other sharp objects properly. · Make sure all instruments and supplies are sterilized if you get a tattoo, have your body pierced, or have acupuncture. To avoid spreading hepatitis C if you have it:  · Do not share needles or other equipment, such as cotton, spoons, and water, if you use needles to inject drugs.   · Keep cuts, scrapes, and blisters covered. This will prevent others from coming in contact with your blood and other body fluids. Throw out any blood-soaked items such as used bandages. · Do not donate blood or sperm. · Wash your hands and anything that has come in contact with your blood. Use soap and water. · Do not share your toothbrush, razor, nail clippers, or anything else that might have your blood on it. · Use latex condoms during sex if you have HIV, multiple sex partners, or a sexually transmitted infection. How is hepatitis C treated? · If you have acute hepatitis C, your doctor will probably prescribe medicine. · If you have chronic hepatitis C, your treatment depends on whether you have liver damage, other health problems you may have, and how much virus is in your body and what type it is. · You will need to see your doctor regularly to have blood tests to check your liver. Follow-up care is a key part of your treatment and safety. Be sure to make and go to all appointments, and call your doctor if you are having problems. It's also a good idea to know your test results and keep a list of the medicines you take. Where can you learn more? Go to http://shruti-pilo.info/. Enter C666 in the search box to learn more about \"Learning About Hepatitis C.\"  Current as of: March 3, 2017  Content Version: 11.4  © 4976-3616 [employer]. Care instructions adapted under license by [employer] (which disclaims liability or warranty for this information). If you have questions about a medical condition or this instruction, always ask your healthcare professional. [place] [address] any warranty or liability for your use of this information.

## 2019-07-28 ENCOUNTER — HOSPITAL ENCOUNTER (EMERGENCY)
Age: 34
Discharge: HOME | End: 2019-07-28
Attending: EMERGENCY MEDICINE
Payer: MEDICAID

## 2019-07-28 VITALS
HEART RATE: 95 BPM | OXYGEN SATURATION: 100 % | RESPIRATION RATE: 14 BRPM | HEIGHT: 59 IN | SYSTOLIC BLOOD PRESSURE: 131 MMHG | WEIGHT: 115 LBS | DIASTOLIC BLOOD PRESSURE: 81 MMHG | TEMPERATURE: 98.2 F | BODY MASS INDEX: 23.18 KG/M2

## 2019-07-28 DIAGNOSIS — F41.9 ANXIETY DISORDER, UNSPECIFIED TYPE: ICD-10-CM

## 2019-07-28 DIAGNOSIS — G89.29 CHRONIC LOW BACK PAIN, UNSPECIFIED BACK PAIN LATERALITY, WITH SCIATICA PRESENCE UNSPECIFIED: Primary | ICD-10-CM

## 2019-07-28 DIAGNOSIS — Z3A.12 12 WEEKS GESTATION OF PREGNANCY: ICD-10-CM

## 2019-07-28 DIAGNOSIS — M54.5 CHRONIC LOW BACK PAIN, UNSPECIFIED BACK PAIN LATERALITY, WITH SCIATICA PRESENCE UNSPECIFIED: Primary | ICD-10-CM

## 2019-07-28 LAB
AMPHET UR QL SCN: NEGATIVE
APPEARANCE UR: ABNORMAL
BACTERIA URNS QL MICRO: ABNORMAL /HPF
BARBITURATES UR QL SCN: NEGATIVE
BENZODIAZ UR QL: NEGATIVE
BILIRUB UR QL: ABNORMAL
CANNABINOIDS UR QL SCN: NEGATIVE
COCAINE UR QL SCN: NEGATIVE
COLOR UR: ABNORMAL
EPITH CASTS URNS QL MICRO: ABNORMAL /LPF (ref 0–5)
GLUCOSE UR STRIP.AUTO-MCNC: NEGATIVE MG/DL
HDSCOM,HDSCOM: NORMAL
HGB UR QL STRIP: NEGATIVE
KETONES UR QL STRIP.AUTO: 80 MG/DL
LEUKOCYTE ESTERASE UR QL STRIP.AUTO: ABNORMAL
METHADONE UR QL: NEGATIVE
MUCOUS THREADS URNS QL MICRO: ABNORMAL /LPF
NITRITE UR QL STRIP.AUTO: NEGATIVE
OPIATES UR QL: NEGATIVE
PCP UR QL: NEGATIVE
PH UR STRIP: 6 [PH] (ref 5–8)
PROT UR STRIP-MCNC: 100 MG/DL
RBC #/AREA URNS HPF: NEGATIVE /HPF (ref 0–5)
SP GR UR REFRACTOMETRY: >1.03 (ref 1–1.03)
UROBILINOGEN UR QL STRIP.AUTO: 1 EU/DL (ref 0.2–1)
WBC URNS QL MICRO: ABNORMAL /HPF (ref 0–5)

## 2019-07-28 PROCEDURE — 74011250637 HC RX REV CODE- 250/637: Performed by: EMERGENCY MEDICINE

## 2019-07-28 PROCEDURE — 81001 URINALYSIS AUTO W/SCOPE: CPT

## 2019-07-28 PROCEDURE — 96372 THER/PROPH/DIAG INJ SC/IM: CPT

## 2019-07-28 PROCEDURE — 80307 DRUG TEST PRSMV CHEM ANLYZR: CPT

## 2019-07-28 PROCEDURE — 74011250636 HC RX REV CODE- 250/636: Performed by: EMERGENCY MEDICINE

## 2019-07-28 PROCEDURE — 99284 EMERGENCY DEPT VISIT MOD MDM: CPT

## 2019-07-28 RX ORDER — CEPHALEXIN 500 MG/1
500 CAPSULE ORAL 3 TIMES DAILY
Qty: 30 CAP | Refills: 0 | Status: SHIPPED | OUTPATIENT
Start: 2019-07-28 | End: 2020-06-25

## 2019-07-28 RX ORDER — PROMETHAZINE HYDROCHLORIDE 25 MG/ML
25 INJECTION, SOLUTION INTRAMUSCULAR; INTRAVENOUS
Status: COMPLETED | OUTPATIENT
Start: 2019-07-28 | End: 2019-07-28

## 2019-07-28 RX ORDER — DIPHENHYDRAMINE HCL 12.5MG/5ML
50 ELIXIR ORAL
Status: COMPLETED | OUTPATIENT
Start: 2019-07-28 | End: 2019-07-28

## 2019-07-28 RX ORDER — CEPHALEXIN 500 MG/1
500 CAPSULE ORAL 2 TIMES DAILY
Refills: 0 | COMMUNITY
Start: 2019-07-24 | End: 2019-07-28

## 2019-07-28 RX ORDER — LANOLIN ALCOHOL/MO/W.PET/CERES
50 CREAM (GRAM) TOPICAL DAILY
Qty: 30 TAB | Refills: 1 | Status: SHIPPED | OUTPATIENT
Start: 2019-07-28 | End: 2020-06-25

## 2019-07-28 RX ADMIN — DIPHENHYDRAMINE HYDROCHLORIDE 50 MG: 25 SOLUTION ORAL at 04:37

## 2019-07-28 RX ADMIN — PROMETHAZINE HYDROCHLORIDE 25 MG: 25 INJECTION INTRAMUSCULAR; INTRAVENOUS at 04:38

## 2019-07-28 NOTE — ED NOTES
Pt was asked to move rooms due to possible need for pelvic bed, pt said \"YEAH SURE RIGHT AFTER I THROW MYSELF ON THE Guadlupe Ady", this RN offered wheelchair to patient who declined but stood up immediately and ambulated with steady gait in nad to bed 11

## 2019-07-28 NOTE — ED NOTES
Pt demanding this RN call Baylee Clark free ride I don't have any money\", This RN informed pt that a medicaid cab would be called, pt ambulated off unit with steady gait in nad at this time

## 2019-07-28 NOTE — ED NOTES
Pt standing by in lobby for Medicaid cab. Per security pt is putting water in her mouth and then spitting it on the floor. Notifying nursing supervisor.

## 2019-07-28 NOTE — ED NOTES
Discharge instructions reviewed with opportunity for questions provided. Pt vocalized understanding. Armband removed and shredded. Pt stable condition at time of discharge. Pt refused repeat vitals, escorted to lobby by charge RN.

## 2019-07-28 NOTE — ED NOTES
Pt bouncing, rolling, screaming, moaning, unable to sit still. MD with another pt, pending MD evaluation. Unable to keep pt on the monitors.

## 2019-07-28 NOTE — ED PROVIDER NOTES
EMERGENCY DEPARTMENT HISTORY AND PHYSICAL EXAM    Date: 7/28/2019  Patient Name: Adria Nielson    History of Presenting Illness     Chief Complaint   Patient presents with    Back Pain         History Provided By: Patient    Additional History (Context):     4:05 AM    Adria Nielson is a 30 y.o. female with pertinent PMHx of endometriosis, LEEP, D&C, anxiety, hyperemesis gravidarum, Hep C, and heroin abuse (clean for ~3-4 years) presenting via EMS to the ED c/o 10/10 severe lower back pain. Pt states that she is ~10 weeks pregnant (10 weeks 2 days per records) with nml US and blood work. Pt notes associated symptoms of N/V, appetite decrease, and anxiety. Pt has been seen in this ER, multiple times, for the same sxs. Pt is currently taking Keflex for a UTI. Pt smokes ~1 ppd. Pt denies any EtOH use. Pt notes a FHx of Von Willebrand disease. Pt specifically denies any fever/chills. PCP: None    There are no other complaints, changes, or physical findings at this time. Past History     Past Medical History:  Past Medical History:   Diagnosis Date    Anxiety     Asthma     Depression     Drug use     Endometriosis     Hepatitis C     Heroin abuse ([facility] [address]     Kidney infection        Past Surgical History:  Past Surgical History:   Procedure Laterality Date    HX DILATION AND CURETTAGE      HX LEEP PROCEDURE         Family History:  History reviewed. No pertinent family history. Social History:  Social History     Tobacco Use    Smoking status: Current Every Day Smoker     Packs/day: 0.50     Years: 1.00     Pack years: 0.50    Smokeless tobacco: Never Used   Substance Use Topics    Alcohol use: Yes     Comment: Occasionally     Drug use: Yes     Types: Other, Heroin     Comment: spice       Allergies:  No Known Allergies      Review of Systems   Review of Systems   Constitutional: Positive for appetite change (decrease). Negative for chills and fever.    Gastrointestinal: Positive for nausea and vomiting. Musculoskeletal: Positive for back pain (lower). Psychiatric/Behavioral: The patient is nervous/anxious. All other systems reviewed and are negative. Physical Exam     Vitals:    07/28/19 0334   BP: 131/81   Pulse: 95   Resp: 14   Temp: 98.2 °F (36.8 °C)   SpO2: 100%   Weight: 52.2 kg (115 lb)   Height: 4' 11\" (1.499 m)     Physical Exam   Constitutional: She is oriented to person, place, and time. She appears well-developed and well-nourished. Hyperventilating; histrionic    HENT:   Head: Normocephalic and atraumatic. Right Ear: External ear normal.   Left Ear: External ear normal.   Mouth/Throat: Oropharynx is clear and moist. No oropharyngeal exudate. Eyes: Pupils are equal, round, and reactive to light. Conjunctivae and EOM are normal. No scleral icterus. No pallor   Neck: Normal range of motion. Neck supple. No JVD present. No tracheal deviation present. No thyromegaly present. Cardiovascular: Normal rate, regular rhythm and normal heart sounds. Pulmonary/Chest: Effort normal and breath sounds normal. No stridor. No respiratory distress. hyperventilating   Abdominal: Soft. Bowel sounds are normal. She exhibits no distension. There is no tenderness. There is no rebound and no guarding. Musculoskeletal: Normal range of motion. She exhibits no edema or tenderness. No soft tissue injuries   Lymphadenopathy:     She has no cervical adenopathy. Neurological: She is alert and oriented to person, place, and time. She has normal reflexes. No cranial nerve deficit. Coordination normal.   Skin: Skin is warm and dry. No rash noted. She is not diaphoretic. No erythema. Psychiatric:   Histrionic  She quickly calmed down when spoken to with a burton uncompromising tone   Nursing note and vitals reviewed.       Diagnostic Study Results     Labs -     Recent Results (from the past 12 hour(s))   URINALYSIS W/ RFLX MICROSCOPIC    Collection Time: 07/28/19  3:30 AM   Result Value Ref Range Color DARK YELLOW      Appearance CLOUDY      Specific gravity >1.030 (H) 1.005 - 1.030    pH (UA) 6.0 5.0 - 8.0      Protein 100 (A) NEG mg/dL    Glucose NEGATIVE  NEG mg/dL    Ketone 80 (A) NEG mg/dL    Bilirubin SMALL (A) NEG      Blood NEGATIVE  NEG      Urobilinogen 1.0 0.2 - 1.0 EU/dL    Nitrites NEGATIVE  NEG      Leukocyte Esterase SMALL (A) NEG     URINE MICROSCOPIC ONLY    Collection Time: 07/28/19  3:30 AM   Result Value Ref Range    WBC 3 to 5 0 - 5 /hpf    RBC NEGATIVE  0 - 5 /hpf    Epithelial cells 1+ 0 - 5 /lpf    Bacteria FEW (A) NEG /hpf    Mucus 3+ (A) NEG /lpf   DRUG SCREEN, URINE    Collection Time: 07/28/19  3:30 AM   Result Value Ref Range    BENZODIAZEPINES NEGATIVE  NEG      BARBITURATES NEGATIVE  NEG      THC (TH-CANNABINOL) NEGATIVE  NEG      OPIATES NEGATIVE  NEG      PCP(PHENCYCLIDINE) NEGATIVE  NEG      COCAINE NEGATIVE  NEG      AMPHETAMINES NEGATIVE  NEG      METHADONE NEGATIVE  NEG      HDSCOM (NOTE)        Radiologic Studies -   No orders to display       Medical Decision Making   I am the first provider for this patient. I reviewed the vital signs, available nursing notes, past medical history, past surgical history, family history and social history. Vital Signs-Reviewed the patient's vital signs. Pulse Oximetry Analysis - 100% on RA     Records Reviewed: Nursing Notes and Old Medical Records    Provider Notes (Medical Decision Making): DDx: this is a chronic pain pt with reoccurrence with N/V during pregnancy. No active vomiting noted. She obvious has anxiety and chronic pain issues, which can only be addressed cautiously due to her pregnancy. Her urine does show evidence of dehydration, but no signs of infection.     PROCEDURES:  Procedures    MEDICATIONS GIVEN IN THE ED:  Medications   promethazine (PHENERGAN) injection 25 mg (25 mg IntraMUSCular Given 7/28/19 5442)   diphenhydrAMINE (BENADRYL) 12.5 mg/5 mL oral elixir 50 mg (50 mg Oral Given 7/28/19 2746)        ED COURSE:   4:05 AM   Initial assessment performed. Diagnosis and Disposition       DISCHARGE NOTE:  4:54 AM  The patient is ready for discharge. The patient's signs, symptoms, diagnosis, and discharge instructions have been discussed and the patient and/or family has conveyed their understanding. The patient and/or family is to follow up as recommended or return to the ER should their symptoms worsen. Plan has been discussed and the patient and/or family is in agreement. Written by Mey Bland ED Scribe, as dictated by Daron Dominique MD.     CLINICAL IMPRESSION:  1. Chronic low back pain, unspecified back pain laterality, with sciatica presence unspecified    2. 12 weeks gestation of pregnancy    3. Anxiety disorder, unspecified type        PLAN:  1. D/C Home  2. Current Discharge Medication List        3. Follow-up Information     Follow up With Specialties Details Why [facility] [address]  Schedule an appointment as soon as possible for a visit for OB follow up [address] [facility] [address]    [facility] EMERGENCY DEPT Emergency Medicine  As needed, If symptoms worsen [address] [address]  [phone]        _______________________________    Attestations: This note is prepared by Chris Samuel, acting as Scribe for Brenda Grimes. Christy Dominique MD.    Brenda Grimes. Christy Dominique MD:  The scribe's documentation has been prepared under my direction and personally reviewed by me in its entirety.  I confirm that the note above accurately reflects all work, treatment, procedures, and medical decision making performed by me.  _______________________________

## 2019-07-28 NOTE — ED NOTES
Attempted to discharge pt, pt became irate screaming, \"Are you serious?!\" continued screaming. Notified charge RN and MD. Security notified and is responding.

## 2019-07-28 NOTE — DISCHARGE INSTRUCTIONS
Patient Education        Backache During Pregnancy: Care Instructions  Your Care Instructions    Back pain has many possible causes. It is often caused by problems with muscles and ligaments in your back. The extra weight during pregnancy can put stress on your back. Moving, lifting, standing, sitting, or sleeping in an awkward way also can strain your back. Back pain can also be a sign of labor. Although it may hurt a lot, back pain often improves on its own. Use good home treatment, and take care not to stress your back. Follow-up care is a key part of your treatment and safety. Be sure to make and go to all appointments, and call your doctor if you are having problems. It's also a good idea to know your test results and keep a list of the medicines you take. How can you care for yourself at home? · Ask your doctor about taking acetaminophen (Tylenol) for pain. Do not take aspirin, ibuprofen (Advil, Motrin), or naproxen (Aleve). · Do not take two or more pain medicines at the same time unless the doctor told you to. Many pain medicines have acetaminophen, which is Tylenol. Too much acetaminophen (Tylenol) can be harmful. · Lie on your side with your knees and hips bent and a pillow between your legs. This reduces stress on your back. · Put ice or cold packs on your back for 10 to 20 minutes at a time, several times a day. Put a thin cloth between the ice and your skin. · Warm baths may also help reduce pain. · Change positions every 30 minutes. Take breaks if you must sit for a long time. Get up and walk around. · Ask your doctor about how much exercise you can do. You may feel better taking short walks or doing gentle movements and stretching in a swimming pool. · Ask your doctor about exercises to stretch and strengthen your back. When should you call for help?   Call your doctor now or seek immediate medical care if:    · You think you are in labor.     · You have new numbness in your buttocks, genital or rectal areas, or legs.     · You have a new loss of bowel or bladder control.    Watch closely for changes in your health, and be sure to contact your doctor if:    · You do not get better as expected. Where can you learn more? Go to http://shruti-pilo.info/. Enter L862 in the search box to learn more about \"Backache During Pregnancy: Care Instructions. \"  Current as of: September 5, 2018  Content Version: 12.1  © 4783-8326 [email or web address]. Care instructions adapted under license by [employer] (which disclaims liability or warranty for this information). If you have questions about a medical condition or this instruction, always ask your healthcare professional. [place] [address] any warranty or liability for your use of this information. Anxiety Disorder: Care Instructions  Your Care Instructions    Anxiety is a normal reaction to stress. Difficult situations can cause you to have symptoms such as sweaty palms and a nervous feeling. In an anxiety disorder, the symptoms are far more severe. Constant worry, muscle tension, trouble sleeping, nausea and diarrhea, and other symptoms can make normal daily activities difficult or impossible. These symptoms may occur for no reason, and they can affect your work, school, or social life. Medicines, counseling, and self-care can all help. Follow-up care is a key part of your treatment and safety. Be sure to make and go to all appointments, and call your doctor if you are having problems. It's also a good idea to know your test results and keep a list of the medicines you take. How can you care for yourself at home? · Take medicines exactly as directed. Call your doctor if you think you are having a problem with your medicine. · Go to your counseling sessions and follow-up appointments. · Recognize and accept your anxiety.  Then, when you are in a situation that makes you anxious, say to yourself, \"This is not an emergency. I feel uncomfortable, but I am not in danger. I can keep going even if I feel anxious. \"  · Be kind to your body:  ? Relieve tension with exercise or a massage. ? Get enough rest.  ? Avoid alcohol, caffeine, nicotine, and illegal drugs. They can increase your anxiety level and cause sleep problems. ? Learn and do relaxation techniques. See below for more about these techniques. · Engage your mind. Get out and do something you enjoy. Go to a [employer] movie, or take a walk or hike. Plan your day. Having too much or too little to do can make you anxious. · Keep a record of your symptoms. Discuss your fears with a good friend or family member, or join a support group for people with similar problems. Talking to others sometimes relieves stress. · Get involved in social groups, or volunteer to help others. Being alone sometimes makes things seem worse than they are. · Get at least 30 minutes of exercise on most days of the week to relieve stress. Walking is a good choice. You also may want to do other activities, such as running, swimming, cycling, or playing tennis or team sports. Relaxation techniques  Do relaxation exercises 10 to 20 minutes a day. You can play soothing, relaxing music while you do them, if you wish. · Tell others in your house that you are going to do your relaxation exercises. Ask them not to disturb you. · Find a comfortable place, away from all distractions and noise. · Lie down on your back, or sit with your back straight. · Focus on your breathing. Make it slow and steady. · Breathe in through your nose. Breathe out through either your nose or mouth. · Breathe deeply, filling up the area between your navel and your rib cage. Breathe so that your belly goes up and down. · Do not hold your breath. · Breathe like this for 5 to 10 minutes. Notice the feeling of calmness throughout your whole body.   As you continue to breathe slowly and deeply, relax by doing the following for another 5 to 10 minutes:  · Tighten and relax each muscle group in your body. You can begin at your toes and work your way up to your head. · Imagine your muscle groups relaxing and becoming heavy. · Empty your mind of all thoughts. · Let yourself relax more and more deeply. · Become aware of the state of calmness that surrounds you. · When your relaxation time is over, you can bring yourself back to alertness by moving your fingers and toes and then your hands and feet and then stretching and moving your entire body. Sometimes people fall asleep during relaxation, but they usually wake up shortly afterward. · Always give yourself time to return to full alertness before you drive a car or do anything that might cause an accident if you are not fully alert. Never play a relaxation tape while you drive a car. When should you call for help? Call 911 anytime you think you may need emergency care. For example, call if:    · You feel you cannot stop from hurting yourself or someone else.   Sweta Olmitz the numbers for these national suicide hotlines: 9-807-990-TALK (3-178-460-608.206.9517) and 6-800-YCSZPUU (7-556.253.3497). If you or someone you know talks about suicide or feeling hopeless, get help right away.   Watch closely for changes in your health, and be sure to contact your doctor if:    · You have anxiety or fear that affects your life.     · You have symptoms of anxiety that are new or different from those you had before. Where can you learn more? Go to http://shruti-pilo.info/. Enter P754 in the search box to learn more about \"Anxiety Disorder: Care Instructions. \"  Current as of: September 11, 2018  Content Version: 12.1  © 9186-9052 [employer]. Care instructions adapted under license by [employer] (which disclaims liability or warranty for this information).  If you have questions about a medical condition or this instruction, always ask your healthcare professional. [address] any warranty or liability for your use of this information. Back Pain: Care Instructions  Your Care Instructions    Back pain has many possible causes. It is often related to problems with muscles and ligaments of the back. It may also be related to problems with the nerves, discs, or bones of the back. Moving, lifting, standing, sitting, or sleeping in an awkward way can strain the back. Sometimes you don't notice the injury until later. Arthritis is another common cause of back pain. Although it may hurt a lot, back pain usually improves on its own within several weeks. Most people recover in 12 weeks or less. Using good home treatment and being careful not to stress your back can help you feel better sooner. Follow-up care is a key part of your treatment and safety. Be sure to make and go to all appointments, and call your doctor if you are having problems. It's also a good idea to know your test results and keep a list of the medicines you take. How can you care for yourself at home? · Sit or lie in positions that are most comfortable and reduce your pain. Try one of these positions when you lie down:  ? Lie on your back with your knees bent and supported by large pillows. ? Lie on the floor with your legs on the seat of a sofa or chair. ? Lie on your side with your knees and hips bent and a pillow between your legs. ? Lie on your stomach if it does not make pain worse. · Do not sit up in bed, and avoid soft couches and twisted positions. Bed rest can help relieve pain at first, but it delays healing. Avoid bed rest after the first day of back pain. · Change positions every 30 minutes. If you must sit for long periods of time, take breaks from sitting. Get up and walk around, or lie in a comfortable position. · Try using a heating pad on a low or medium setting for 15 to 20 minutes every 2 or 3 hours.  Try a warm shower in place of one session with the heating pad. · You can also try an ice pack for 10 to 15 minutes every 2 to 3 hours. Put a thin cloth between the ice pack and your skin. · Take pain medicines exactly as directed. ? If the doctor gave you a prescription medicine for pain, take it as prescribed. ? If you are not taking a prescription pain medicine, ask your doctor if you can take an over-the-counter medicine. · Take short walks several times a day. You can start with 5 to 10 minutes, 3 or 4 times a day, and work up to longer walks. Walk on level surfaces and avoid hills and stairs until your back is better. · Return to work and other activities as soon as you can. Continued rest without activity is usually not good for your back. · To prevent future back pain, do exercises to stretch and strengthen your back and stomach. Learn how to use good posture, safe lifting techniques, and proper body mechanics. When should you call for help? Call your doctor now or seek immediate medical care if:    · You have new or worsening numbness in your legs.     · You have new or worsening weakness in your legs. (This could make it hard to stand up.)     · You lose control of your bladder or bowels.    Watch closely for changes in your health, and be sure to contact your doctor if:    · You have a fever, lose weight, or don't feel well.     · You do not get better as expected. Where can you learn more? Go to http://shruti-pilo.info/. Enter S010 in the search box to learn more about \"Back Pain: Care Instructions. \"  Current as of: September 20, 2018  Content Version: 12.1  © 2168-0869 [employer]. Care instructions adapted under license by [employer] (which disclaims liability or warranty for this information).  If you have questions about a medical condition or this instruction, always ask your healthcare professional. [address] any warranty or liability for your use of this information.

## 2019-09-03 ENCOUNTER — HOSPITAL ENCOUNTER (EMERGENCY)
Age: 34
Discharge: HOME | End: 2019-09-04
Attending: EMERGENCY MEDICINE
Payer: MEDICAID

## 2019-09-03 DIAGNOSIS — F41.9 ANXIETY: ICD-10-CM

## 2019-09-03 DIAGNOSIS — M54.5 CHRONIC LOW BACK PAIN, UNSPECIFIED BACK PAIN LATERALITY, WITH SCIATICA PRESENCE UNSPECIFIED: Primary | ICD-10-CM

## 2019-09-03 DIAGNOSIS — F60.9 PERSONALITY DISORDER (HCC): ICD-10-CM

## 2019-09-03 DIAGNOSIS — G89.29 CHRONIC LOW BACK PAIN, UNSPECIFIED BACK PAIN LATERALITY, WITH SCIATICA PRESENCE UNSPECIFIED: Primary | ICD-10-CM

## 2019-09-03 LAB
ALBUMIN SERPL-MCNC: 4.3 G/DL (ref 3.4–5)
ALBUMIN/GLOB SERPL: 1.4 {RATIO} (ref 0.8–1.7)
ALP SERPL-CCNC: 81 U/L (ref 45–117)
ALT SERPL-CCNC: 28 U/L (ref 13–56)
ANION GAP SERPL CALC-SCNC: 9 MMOL/L (ref 3–18)
APPEARANCE UR: ABNORMAL
AST SERPL-CCNC: 19 U/L (ref 10–38)
BACTERIA URNS QL MICRO: ABNORMAL /HPF
BASOPHILS # BLD: 0 K/UL (ref 0–0.1)
BASOPHILS NFR BLD: 0 % (ref 0–2)
BILIRUB SERPL-MCNC: 0.5 MG/DL (ref 0.2–1)
BILIRUB UR QL: ABNORMAL
BUN SERPL-MCNC: 13 MG/DL (ref 7–18)
BUN/CREAT SERPL: 18 (ref 12–20)
CALCIUM SERPL-MCNC: 9.4 MG/DL (ref 8.5–10.1)
CHLORIDE SERPL-SCNC: 111 MMOL/L (ref 100–111)
CO2 SERPL-SCNC: 25 MMOL/L (ref 21–32)
COLOR UR: ABNORMAL
CREAT SERPL-MCNC: 0.73 MG/DL (ref 0.6–1.3)
DIFFERENTIAL METHOD BLD: ABNORMAL
EOSINOPHIL # BLD: 0.1 K/UL (ref 0–0.4)
EOSINOPHIL NFR BLD: 1 % (ref 0–5)
EPITH CASTS URNS QL MICRO: ABNORMAL /LPF (ref 0–5)
ERYTHROCYTE [DISTWIDTH] IN BLOOD BY AUTOMATED COUNT: 17.4 % (ref 11.6–14.5)
GLOBULIN SER CALC-MCNC: 3 G/DL (ref 2–4)
GLUCOSE SERPL-MCNC: 97 MG/DL (ref 74–99)
GLUCOSE UR STRIP.AUTO-MCNC: NEGATIVE MG/DL
HCG SERPL QL: POSITIVE
HCT VFR BLD AUTO: 37.3 % (ref 35–45)
HGB BLD-MCNC: 12.1 G/DL (ref 12–16)
HGB UR QL STRIP: ABNORMAL
KETONES UR QL STRIP.AUTO: 40 MG/DL
LEUKOCYTE ESTERASE UR QL STRIP.AUTO: ABNORMAL
LIPASE SERPL-CCNC: 100 U/L (ref 73–393)
LYMPHOCYTES # BLD: 1.7 K/UL (ref 0.9–3.6)
LYMPHOCYTES NFR BLD: 15 % (ref 21–52)
MCH RBC QN AUTO: 29.4 PG (ref 24–34)
MCHC RBC AUTO-ENTMCNC: 32.4 G/DL (ref 31–37)
MCV RBC AUTO: 90.5 FL (ref 74–97)
MONOCYTES # BLD: 1.3 K/UL (ref 0.05–1.2)
MONOCYTES NFR BLD: 11 % (ref 3–10)
MUCOUS THREADS URNS QL MICRO: ABNORMAL /LPF
NEUTS SEG # BLD: 8.9 K/UL (ref 1.8–8)
NEUTS SEG NFR BLD: 73 % (ref 40–73)
NITRITE UR QL STRIP.AUTO: NEGATIVE
PH UR STRIP: 5.5 [PH] (ref 5–8)
PLATELET # BLD AUTO: 382 K/UL (ref 135–420)
PMV BLD AUTO: 11.1 FL (ref 9.2–11.8)
POTASSIUM SERPL-SCNC: 3.8 MMOL/L (ref 3.5–5.5)
PROT SERPL-MCNC: 7.3 G/DL (ref 6.4–8.2)
PROT UR STRIP-MCNC: 100 MG/DL
RBC # BLD AUTO: 4.12 M/UL (ref 4.2–5.3)
RBC #/AREA URNS HPF: ABNORMAL /HPF (ref 0–5)
SODIUM SERPL-SCNC: 145 MMOL/L (ref 136–145)
SP GR UR REFRACTOMETRY: >1.03 (ref 1–1.03)
UROBILINOGEN UR QL STRIP.AUTO: 1 EU/DL (ref 0.2–1)
WBC # BLD AUTO: 12 K/UL (ref 4.6–13.2)
WBC URNS QL MICRO: ABNORMAL /HPF (ref 0–5)

## 2019-09-03 PROCEDURE — 84702 CHORIONIC GONADOTROPIN TEST: CPT

## 2019-09-03 PROCEDURE — 99285 EMERGENCY DEPT VISIT HI MDM: CPT

## 2019-09-03 PROCEDURE — 86900 BLOOD TYPING SEROLOGIC ABO: CPT

## 2019-09-03 PROCEDURE — 84703 CHORIONIC GONADOTROPIN ASSAY: CPT

## 2019-09-03 PROCEDURE — 83690 ASSAY OF LIPASE: CPT

## 2019-09-03 PROCEDURE — 86870 RBC ANTIBODY IDENTIFICATION: CPT

## 2019-09-03 PROCEDURE — 80053 COMPREHEN METABOLIC PANEL: CPT

## 2019-09-03 PROCEDURE — 74011250637 HC RX REV CODE- 250/637: Performed by: EMERGENCY MEDICINE

## 2019-09-03 PROCEDURE — 81001 URINALYSIS AUTO W/SCOPE: CPT

## 2019-09-03 PROCEDURE — 74011250636 HC RX REV CODE- 250/636: Performed by: EMERGENCY MEDICINE

## 2019-09-03 PROCEDURE — 85025 COMPLETE CBC W/AUTO DIFF WBC: CPT

## 2019-09-03 RX ORDER — HYDROXYZINE 25 MG/1
25 TABLET, FILM COATED ORAL
Status: COMPLETED | OUTPATIENT
Start: 2019-09-03 | End: 2019-09-03

## 2019-09-03 RX ADMIN — HYDROXYZINE HYDROCHLORIDE 25 MG: 25 TABLET, FILM COATED ORAL at 23:51

## 2019-09-03 RX ADMIN — SODIUM CHLORIDE 1000 ML: 900 INJECTION, SOLUTION INTRAVENOUS at 23:50

## 2019-09-04 VITALS
SYSTOLIC BLOOD PRESSURE: 110 MMHG | RESPIRATION RATE: 18 BRPM | HEART RATE: 93 BPM | WEIGHT: 108 LBS | OXYGEN SATURATION: 100 % | TEMPERATURE: 97.9 F | DIASTOLIC BLOOD PRESSURE: 50 MMHG | BODY MASS INDEX: 21.81 KG/M2

## 2019-09-04 LAB
ABO + RH BLD: NORMAL
BLOOD BANK CMNT PATIENT-IMP: NORMAL
BLOOD GROUP ANTIBODIES SERPL: NORMAL
BLOOD GROUP ANTIBODIES SERPL: NORMAL
HCG SERPL-ACNC: 7 MIU/ML (ref 0–10)
SPECIMEN EXP DATE BLD: NORMAL

## 2019-09-04 RX ORDER — CYCLOBENZAPRINE HCL 10 MG
10 TABLET ORAL
Qty: 20 TAB | Refills: 0 | Status: SHIPPED | OUTPATIENT
Start: 2019-09-04 | End: 2019-09-04

## 2019-09-04 RX ORDER — CYCLOBENZAPRINE HCL 10 MG
10 TABLET ORAL
Qty: 20 TAB | Refills: 0 | Status: SHIPPED | OUTPATIENT
Start: 2019-09-04 | End: 2020-06-25

## 2019-09-04 RX ORDER — IBUPROFEN 400 MG/1
400 TABLET ORAL
Qty: 30 TAB | Refills: 1 | Status: SHIPPED | OUTPATIENT
Start: 2019-09-04 | End: 2020-06-26

## 2019-09-04 RX ORDER — IBUPROFEN 600 MG/1
400 TABLET ORAL
Qty: 20 TAB | Refills: 0 | Status: SHIPPED | OUTPATIENT
Start: 2019-09-04 | End: 2019-09-04

## 2019-09-04 NOTE — ED PROVIDER NOTES
EMERGENCY DEPARTMENT HISTORY AND PHYSICAL EXAM    Date: 9/3/2019  Patient Name: Kyle Wright    History of Presenting Illness     Chief Complaint   Patient presents with    Back Pain         History Provided By: Patient    Chief Complaint: Back Pain  Duration: Few Days  Timing:  Acute  Location: left lower back  Quality: Sharp  Severity: Mild  Modifying Factors: No modifying factors  Associated Symptoms: dysuria, vaginal bleeding, and abdominal pain    Additional History (Context):   11:12 PM  Kyle Wright is a 30 y.o. female with PMHX of Endometriosis, heroin abuse, depression, hepatitis C, Kidney infection, Asthma, anxiety, who presents via EMS to the emergency department C/O sharp left side lower back pain onset few hours ago. Associated sxs include dysuria, vaginal bleeding, and abdominal pain. She states that she went to [facility] today and was referred to the ER. She reports that she had an  2 weeks ago. She reports that at her last visit she was given antibiotics for a UTI, bacterial vaginosis, and yeast infection. Pt denies fever, chills, urinary frequency, and any other sxs or complaints. PCP: None    Current Outpatient Medications   Medication Sig Dispense Refill    ibuprofen (MOTRIN) 600 mg tablet Take 1 Tab by mouth every six (6) hours as needed for Pain. 20 Tab 0    cyclobenzaprine (FLEXERIL) 10 mg tablet Take 1 Tab by mouth three (3) times daily as needed for Muscle Spasm(s). 20 Tab 0    cephALEXin (KEFLEX) 500 mg capsule Take 1 Cap by mouth three (3) times daily. 30 Cap 0    doxylamine succinate (UNISOM) 25 mg tablet Take 1 Tab by mouth every six (6) hours as needed (vomiting of pregnancy). Indications: Nausea and Vomiting of Pregnancy 20 Tab 0    pyridoxine, vitamin B6, (VITAMIN B-6) 50 mg tablet Take 1 Tab by mouth daily.  Indications: Nausea and Vomiting of Pregnancy 30 Tab 1       Past History     Past Medical History:  Past Medical History:   Diagnosis Date    Anxiety     Asthma     Depression     Drug use     Endometriosis     Hepatitis C     Heroin abuse ([facility] [address]     Kidney infection        Past Surgical History:  Past Surgical History:   Procedure Laterality Date    HX DILATION AND CURETTAGE      HX LEEP PROCEDURE         Family History:  History reviewed. No pertinent family history. Social History:  Social History     Tobacco Use    Smoking status: Current Every Day Smoker     Packs/day: 0.50     Years: 1.00     Pack years: 0.50    Smokeless tobacco: Never Used   Substance Use Topics    Alcohol use: Yes     Comment: Occasionally     Drug use: Yes     Types: Other, Heroin     Comment: spice       Allergies:  No Known Allergies      Review of Systems   Review of Systems   Constitutional: Negative for chills and fever. Gastrointestinal: Positive for abdominal pain. Genitourinary: Positive for dysuria and vaginal bleeding. Negative for frequency. Musculoskeletal: Positive for back pain. All other systems reviewed and are negative. Physical Exam     Vitals:    09/03/19 2100 09/03/19 2202 09/03/19 2227 09/03/19 2315   BP: 170/60 100/65  110/50   Pulse:       Resp:       Temp:       SpO2:  100% 100% 100%   Weight:         Physical Exam   Constitutional: She is oriented to person, place, and time. She appears well-developed and well-nourished. No distress. HENT:   Head: Normocephalic and atraumatic. Right Ear: External ear normal.   Left Ear: External ear normal.   Mouth/Throat: Oropharynx is clear and moist. No oropharyngeal exudate. Eyes: Pupils are equal, round, and reactive to light. Conjunctivae and EOM are normal. No scleral icterus. No pallor   Neck: Normal range of motion. Neck supple. No JVD present. No tracheal deviation present. No thyromegaly present. Cardiovascular: Normal rate, regular rhythm and normal heart sounds. Pulmonary/Chest: Effort normal and breath sounds normal. No stridor. No respiratory distress. Abdominal: Soft.  Bowel sounds are normal. She exhibits no distension. There is no tenderness. There is no rebound and no guarding. Musculoskeletal: Normal range of motion. She exhibits no edema or tenderness. Diffuse tenderness very mild to entire lower back without focus. Lymphadenopathy:     She has no cervical adenopathy. Neurological: She is alert and oriented to person, place, and time. She has normal reflexes. No cranial nerve deficit. Coordination normal.   Skin: Skin is warm and dry. No rash noted. She is not diaphoretic. No erythema. Few scattered tattoos   Psychiatric: She has a normal mood and affect. Her behavior is normal. Judgment and thought content normal. Her speech is rapid and/or pressured. Very rapid and pressured speech   Nursing note and vitals reviewed. Diagnostic Study Results     Labs -     Recent Results (from the past 12 hour(s))   CBC WITH AUTOMATED DIFF    Collection Time: 09/03/19  9:50 PM   Result Value Ref Range    WBC 12.0 4.6 - 13.2 K/uL    RBC 4.12 (L) 4.20 - 5.30 M/uL    HGB 12.1 12.0 - 16.0 g/dL    HCT 37.3 35.0 - 45.0 %    MCV 90.5 74.0 - 97.0 FL    MCH 29.4 24.0 - 34.0 PG    MCHC 32.4 31.0 - 37.0 g/dL    RDW 17.4 (H) 11.6 - 14.5 %    PLATELET 447 160 - 527 K/uL    MPV 11.1 9.2 - 11.8 FL    NEUTROPHILS 73 40 - 73 %    LYMPHOCYTES 15 (L) 21 - 52 %    MONOCYTES 11 (H) 3 - 10 %    EOSINOPHILS 1 0 - 5 %    BASOPHILS 0 0 - 2 %    ABS. NEUTROPHILS 8.9 (H) 1.8 - 8.0 K/UL    ABS. LYMPHOCYTES 1.7 0.9 - 3.6 K/UL    ABS. MONOCYTES 1.3 (H) 0.05 - 1.2 K/UL    ABS. EOSINOPHILS 0.1 0.0 - 0.4 K/UL    ABS.  BASOPHILS 0.0 0.0 - 0.1 K/UL    DF AUTOMATED     METABOLIC PANEL, COMPREHENSIVE    Collection Time: 09/03/19  9:50 PM   Result Value Ref Range    Sodium 145 136 - 145 mmol/L    Potassium 3.8 3.5 - 5.5 mmol/L    Chloride 111 100 - 111 mmol/L    CO2 25 21 - 32 mmol/L    Anion gap 9 3.0 - 18 mmol/L    Glucose 97 74 - 99 mg/dL    BUN 13 7.0 - 18 MG/DL    Creatinine 0.73 0.6 - 1.3 MG/DL BUN/Creatinine ratio 18 12 - 20      GFR est AA >60 >60 ml/min/1.73m2    GFR est non-AA >60 >60 ml/min/1.73m2    Calcium 9.4 8.5 - 10.1 MG/DL    Bilirubin, total 0.5 0.2 - 1.0 MG/DL    ALT (SGPT) 28 13 - 56 U/L    AST (SGOT) 19 10 - 38 U/L    Alk.  phosphatase 81 45 - 117 U/L    Protein, total 7.3 6.4 - 8.2 g/dL    Albumin 4.3 3.4 - 5.0 g/dL    Globulin 3.0 2.0 - 4.0 g/dL    A-G Ratio 1.4 0.8 - 1.7     LIPASE    Collection Time: 09/03/19  9:50 PM   Result Value Ref Range    Lipase 100 73 - 393 U/L   TYPE & SCREEN    Collection Time: 09/03/19  9:50 PM   Result Value Ref Range    Crossmatch Expiration 09/06/2019     ABO/Rh(D) A NEGATIVE     Antibody screen POS    HCG QL SERUM    Collection Time: 09/03/19  9:50 PM   Result Value Ref Range    HCG, Ql. POSITIVE (A) NEG     BETA HCG, QT    Collection Time: 09/03/19  9:50 PM   Result Value Ref Range    Beta HCG, QT 7 0 - 10 MIU/ML   URINALYSIS W/ RFLX MICROSCOPIC    Collection Time: 09/03/19 11:22 PM   Result Value Ref Range    Color DARK YELLOW      Appearance CLOUDY      Specific gravity >1.030 (H) 1.005 - 1.030    pH (UA) 5.5 5.0 - 8.0      Protein 100 (A) NEG mg/dL    Glucose NEGATIVE  NEG mg/dL    Ketone 40 (A) NEG mg/dL    Bilirubin SMALL (A) NEG      Blood LARGE (A) NEG      Urobilinogen 1.0 0.2 - 1.0 EU/dL    Nitrites NEGATIVE  NEG      Leukocyte Esterase TRACE (A) NEG     URINE MICROSCOPIC ONLY    Collection Time: 09/03/19 11:22 PM   Result Value Ref Range    WBC 0 to 2 0 - 5 /hpf    RBC 5 to 10 0 - 5 /hpf    Epithelial cells 3+ 0 - 5 /lpf    Bacteria 1+ (A) NEG /hpf    Mucus 4+ (A) NEG /lpf       Radiologic Studies -   No orders to display     CT Results  (Last 48 hours)    None        CXR Results  (Last 48 hours)    None          Medications given in the ED-  Medications   sodium chloride 0.9 % bolus infusion 1,000 mL (1,000 mL IntraVENous New Bag 9/3/19 2350)   hydrOXYzine HCl (ATARAX) tablet 25 mg (25 mg Oral Given 9/3/19 2351)         Medical Decision Making I am the first provider for this patient. I reviewed the vital signs, available nursing notes, past medical history, past surgical history, family history and social history. Vital Signs-Reviewed the patient's vital signs. Pulse Oximetry Analysis - 100% on RA     Records Reviewed: Nursing Notes and Old Medical Records    Provider Notes (Medical Decision Making): Ddx: despite complaints of acuity records show long lengthy back pain type of presentation. She may have complications from her recent procedure and/or pregnancy. No records are available for her recent procedure. Exam demonstrates nothing acute. Will try to review records. Sxs appear to be chronic pain complaint though we would consider a possibility of endometritis or retained products of conception as her complaint. Will review lab work. Check her urine for infections. Consider US. Currently hesitant to treat her anxiety or pain with controlled substances. Procedures:  Procedures    ED Course:   11:12 PM Initial assessment performed. The patients presenting problems have been discussed, and they are in agreement with the care plan formulated and outlined with them. I have encouraged them to ask questions as they arise throughout their visit. Diagnosis and Disposition       DISCHARGE NOTE:  12:52 AM  Venessa Melody Fredrick's  results have been reviewed with her. She has been counseled regarding her diagnosis, treatment, and plan. She verbally conveys understanding and agreement of the signs, symptoms, diagnosis, treatment and prognosis and additionally agrees to follow up as discussed. She also agrees with the care-plan and conveys that all of her questions have been answered. I have also provided discharge instructions for her that include: educational information regarding their diagnosis and treatment, and list of reasons why they would want to return to the ED prior to their follow-up appointment, should her condition change.  She has been provided with education for proper emergency department utilization. CLINICAL IMPRESSION:    1. Chronic low back pain, unspecified back pain laterality, with sciatica presence unspecified    2. Anxiety    3. Personality disorder ([address]        PLAN:  1. D/C Home  2. Current Discharge Medication List      START taking these medications    Details   ibuprofen (MOTRIN) 600 mg tablet Take 1 Tab by mouth every six (6) hours as needed for Pain. Qty: 20 Tab, Refills: 0      cyclobenzaprine (FLEXERIL) 10 mg tablet Take 1 Tab by mouth three (3) times daily as needed for Muscle Spasm(s). Qty: 20 Tab, Refills: 0         CONTINUE these medications which have NOT CHANGED    Details   cephALEXin (KEFLEX) 500 mg capsule Take 1 Cap by mouth three (3) times daily. Qty: 30 Cap, Refills: 0      doxylamine succinate (UNISOM) 25 mg tablet Take 1 Tab by mouth every six (6) hours as needed (vomiting of pregnancy). Indications: Nausea and Vomiting of Pregnancy  Qty: 20 Tab, Refills: 0      pyridoxine, vitamin B6, (VITAMIN B-6) 50 mg tablet Take 1 Tab by mouth daily. Indications: Nausea and Vomiting of Pregnancy  Qty: 30 Tab, Refills: 1           3. Follow-up Information     Follow up With Specialties Details Why [facility] ([facility])  Schedule an appointment as soon as possible for a visit For follow up [facility] [address] [place] [address] Sandee VELARDE [facility] EMERGENCY DEPT Emergency Medicine Go to As needed, if symptoms worsen [address] [place] [id number]  [phone]        _______________________________    Attestations: This note is prepared by [facility], acting as Scribe for Alissa. Kristin Reich MD.    Alissa. Kristin Reich MD:  The scribe's documentation has been prepared under my direction and personally reviewed by me in its entirety.   I confirm that the note above accurately reflects all work, treatment, procedures, and medical decision making performed by me.  _______________________________

## 2019-09-04 NOTE — ED TRIAGE NOTES
[facility] with [facility]. [facility] reports possible uti.  Pt rcvd [facility]adol [facility] at [facility].

## 2019-09-04 NOTE — DISCHARGE INSTRUCTIONS
Back Pain: Care Instructions  Your Care Instructions    Back pain has many possible causes. It is often related to problems with muscles and ligaments of the back. It may also be related to problems with the nerves, discs, or bones of the back. Moving, lifting, standing, sitting, or sleeping in an awkward way can strain the back. Sometimes you don't notice the injury until later. Arthritis is another common cause of back pain. Although it may hurt a lot, back pain usually improves on its own within several weeks. Most people recover in 12 weeks or less. Using good home treatment and being careful not to stress your back can help you feel better sooner. Follow-up care is a key part of your treatment and safety. Be sure to make and go to all appointments, and call your doctor if you are having problems. It's also a good idea to know your test results and keep a list of the medicines you take. How can you care for yourself at home? · Sit or lie in positions that are most comfortable and reduce your pain. Try one of these positions when you lie down:  ? Lie on your back with your knees bent and supported by large pillows. ? Lie on the floor with your legs on the seat of a sofa or chair. ? Lie on your side with your knees and hips bent and a pillow between your legs. ? Lie on your stomach if it does not make pain worse. · Do not sit up in bed, and avoid soft couches and twisted positions. Bed rest can help relieve pain at first, but it delays healing. Avoid bed rest after the first day of back pain. · Change positions every 30 minutes. If you must sit for long periods of time, take breaks from sitting. Get up and walk around, or lie in a comfortable position. · Try using a heating pad on a low or medium setting for 15 to 20 minutes every 2 or 3 hours. Try a warm shower in place of one session with the heating pad. · You can also try an ice pack for 10 to 15 minutes every 2 to 3 hours.  Put a thin cloth between the ice pack and your skin. · Take pain medicines exactly as directed. ? If the doctor gave you a prescription medicine for pain, take it as prescribed. ? If you are not taking a prescription pain medicine, ask your doctor if you can take an over-the-counter medicine. · Take short walks several times a day. You can start with 5 to 10 minutes, 3 or 4 times a day, and work up to longer walks. Walk on level surfaces and avoid hills and stairs until your back is better. · Return to work and other activities as soon as you can. Continued rest without activity is usually not good for your back. · To prevent future back pain, do exercises to stretch and strengthen your back and stomach. Learn how to use good posture, safe lifting techniques, and proper body mechanics. When should you call for help? Call your doctor now or seek immediate medical care if:    · You have new or worsening numbness in your legs.     · You have new or worsening weakness in your legs. (This could make it hard to stand up.)     · You lose control of your bladder or bowels.    Watch closely for changes in your health, and be sure to contact your doctor if:    · You have a fever, lose weight, or don't feel well.     · You do not get better as expected. Where can you learn more? Go to http://shruti-pilo.info/. Enter S343 in the search box to learn more about \"Back Pain: Care Instructions. \"  Current as of: September 20, 2018  Content Version: 12.1  © 2732-3202 [employer]. Care instructions adapted under license by [employer] (which disclaims liability or warranty for this information). If you have questions about a medical condition or this instruction, always ask your healthcare professional. [place] [address] any warranty or liability for your use of this information.          Anxiety Disorder: Care Instructions  Your Care Instructions    Anxiety is a normal reaction to stress. Difficult situations can cause you to have symptoms such as sweaty palms and a nervous feeling. In an anxiety disorder, the symptoms are far more severe. Constant worry, muscle tension, trouble sleeping, nausea and diarrhea, and other symptoms can make normal daily activities difficult or impossible. These symptoms may occur for no reason, and they can affect your work, school, or social life. Medicines, counseling, and self-care can all help. Follow-up care is a key part of your treatment and safety. Be sure to make and go to all appointments, and call your doctor if you are having problems. It's also a good idea to know your test results and keep a list of the medicines you take. How can you care for yourself at home? · Take medicines exactly as directed. Call your doctor if you think you are having a problem with your medicine. · Go to your counseling sessions and follow-up appointments. · Recognize and accept your anxiety. Then, when you are in a situation that makes you anxious, say to yourself, \"This is not an emergency. I feel uncomfortable, but I am not in danger. I can keep going even if I feel anxious. \"  · Be kind to your body:  ? Relieve tension with exercise or a massage. ? Get enough rest.  ? Avoid alcohol, caffeine, nicotine, and illegal drugs. They can increase your anxiety level and cause sleep problems. ? Learn and do relaxation techniques. See below for more about these techniques. · Engage your mind. Get out and do something you enjoy. Go to a funny movie, or take a walk or hike. Plan your day. Having too much or too little to do can make you anxious. · Keep a record of your symptoms. Discuss your fears with a good friend or family member, or join a support group for people with similar problems. Talking to others sometimes relieves stress. · Get involved in social groups, or volunteer to help others. Being alone sometimes makes things seem worse than they are.   · Get at least 30 minutes of exercise on most days of the week to relieve stress. Walking is a good choice. You also may want to do other activities, such as running, swimming, cycling, or playing tennis or team sports. Relaxation techniques  Do relaxation exercises 10 to 20 minutes a day. You can play soothing, relaxing music while you do them, if you wish. · Tell others in your house that you are going to do your relaxation exercises. Ask them not to disturb you. · Find a comfortable place, away from all distractions and noise. · Lie down on your back, or sit with your back straight. · Focus on your breathing. Make it slow and steady. · Breathe in through your nose. Breathe out through either your nose or mouth. · Breathe deeply, filling up the area between your navel and your rib cage. Breathe so that your belly goes up and down. · Do not hold your breath. · Breathe like this for 5 to 10 minutes. Notice the feeling of calmness throughout your whole body. As you continue to breathe slowly and deeply, relax by doing the following for another 5 to 10 minutes:  · Tighten and relax each muscle group in your body. You can begin at your toes and work your way up to your head. · Imagine your muscle groups relaxing and becoming heavy. · Empty your mind of all thoughts. · Let yourself relax more and more deeply. · Become aware of the state of calmness that surrounds you. · When your relaxation time is over, you can bring yourself back to alertness by moving your fingers and toes and then your hands and feet and then stretching and moving your entire body. Sometimes people fall asleep during relaxation, but they usually wake up shortly afterward. · Always give yourself time to return to full alertness before you drive a car or do anything that might cause an accident if you are not fully alert. Never play a relaxation tape while you drive a car. When should you call for help?   Call 911 anytime you think you may need emergency care. For example, call if:    · You feel you cannot stop from hurting yourself or someone else.   [place]a [place] the numbers for these national suicide hotlines: 3-208-247-TALK (3-349-262-873.788.1725) and 3-913-DYGRDAL (4-797.825.5026). If you or someone you know talks about suicide or feeling hopeless, get help right away.   Watch closely for changes in your health, and be sure to contact your doctor if:    · You have anxiety or fear that affects your life.     · You have symptoms of anxiety that are new or different from those you had before. Where can you learn more? Go to http://shruti-pilo.info/. Enter P754 in the search box to learn more about \"Anxiety Disorder: Care Instructions. \"  Current as of: September 11, 2018  Content Version: 12.1  © 7235-3914 [employer]. Care instructions adapted under license by [employer] (which disclaims liability or warranty for this information). If you have questions about a medical condition or this instruction, always ask your healthcare professional. [address] any warranty or liability for your use of this information.

## 2019-09-04 NOTE — ED NOTES
Pt is in room, screaming, moaning loudly, calling out. Thrashing in the bed and being belligerent. Pt has cardiac monitor in her hands beating it up against the wall. Pt adv this behavior is unacceptable and should it continue, security will be notified. Provider at the bedside.

## 2019-09-04 NOTE — ED NOTES
Attempted to ease pt's anxiety. Explained that we're waiting for the provider to perform their assessment, reassured pt that she's safe and we can monitor her V/S if she rests in the stretcher. Pt states she didn't want to come to [facility], she doesn't know why she's here. Medic states that pt requested to come to [facility]. Pt transported for continuing severe back pain despite interventions at [facility]. Provided pt with her purse and cell phone. Pt calmer at this time.

## 2019-09-04 NOTE — ED NOTES
Pt presents thrashing in stretcher, unable to sit still, crying hysterically, vocalizing, slamming her body around. Assessment variable pt difficult to assess, uncooperative. Side rails up x 2, stretcher in lowest position. [facility] summary reports labs, urinalysis, and Toradol 60 mg IM.

## 2020-01-01 NOTE — ED TRIAGE NOTES
Pt brought by EMS from home for complaint of severe left sided flank pain and lower abdominal pain. Pt reports pain as 10/10, and sharp. Pt states she has had pain x 3 days but did not want to come to the ED. Per EMS, pt thrashing around, yelling loudly, and hitting herself on the back en route to ED. On pt arrival, continues with agitated behavior, screaming loudly and throwing self around on stretcher saying, \"It hurts! I can't take it anymore! Please help me! \" Pt w/ hx of endometriosis treated with oxycodone/ acetaminophen last taken at 0730 today with no relief. Sepsis Screening completed    (  )Patient meets SIRS criteria. ( X )Patient does not meet SIRS criteria.       SIRS Criteria is achieved when two or more of the following are present   Temperature < 96.8°F (36°C) or > 100.9°F (38.3°C)   Heart Rate > 90 beats per minute   Respiratory Rate > 20 breaths per minute   WBC count > 12,000 or <4,000 or > 10% bands
Parent

## 2020-02-14 NOTE — ED NOTES
Floor called to have receiving nurse review SBAR. This RN informed that receiving RN is currently in MRI with another patient. Other

## 2020-06-25 ENCOUNTER — HOSPITAL ENCOUNTER (EMERGENCY)
Age: 35
Discharge: HOME | End: 2020-06-26
Attending: EMERGENCY MEDICINE
Payer: MEDICAID

## 2020-06-25 ENCOUNTER — APPOINTMENT (OUTPATIENT)
Dept: CT IMAGING | Age: 35
End: 2020-06-25
Attending: EMERGENCY MEDICINE
Payer: MEDICAID

## 2020-06-25 DIAGNOSIS — B18.2 CHRONIC HEPATITIS C WITHOUT HEPATIC COMA (HCC): ICD-10-CM

## 2020-06-25 DIAGNOSIS — Z90.710 H/O ABDOMINAL HYSTERECTOMY: ICD-10-CM

## 2020-06-25 DIAGNOSIS — R10.2 PELVIC PAIN: Primary | ICD-10-CM

## 2020-06-25 LAB
ALBUMIN SERPL-MCNC: 3.4 G/DL (ref 3.4–5)
ALBUMIN/GLOB SERPL: 1 {RATIO} (ref 0.8–1.7)
ALP SERPL-CCNC: 90 U/L (ref 45–117)
ALT SERPL-CCNC: 50 U/L (ref 13–56)
ANION GAP SERPL CALC-SCNC: 7 MMOL/L (ref 3–18)
APTT PPP: 27.5 SEC (ref 23–36.4)
AST SERPL-CCNC: 38 U/L (ref 10–38)
BASOPHILS # BLD: 0.1 K/UL (ref 0–0.1)
BASOPHILS NFR BLD: 1 % (ref 0–2)
BILIRUB DIRECT SERPL-MCNC: <0.1 MG/DL (ref 0–0.2)
BILIRUB SERPL-MCNC: 0.3 MG/DL (ref 0.2–1)
BUN SERPL-MCNC: 10 MG/DL (ref 7–18)
BUN/CREAT SERPL: 15 (ref 12–20)
CALCIUM SERPL-MCNC: 8.7 MG/DL (ref 8.5–10.1)
CHLORIDE SERPL-SCNC: 108 MMOL/L (ref 100–111)
CO2 SERPL-SCNC: 28 MMOL/L (ref 21–32)
CREAT SERPL-MCNC: 0.67 MG/DL (ref 0.6–1.3)
DIFFERENTIAL METHOD BLD: ABNORMAL
EOSINOPHIL # BLD: 0.2 K/UL (ref 0–0.4)
EOSINOPHIL NFR BLD: 2 % (ref 0–5)
ERYTHROCYTE [DISTWIDTH] IN BLOOD BY AUTOMATED COUNT: 17.5 % (ref 11.6–14.5)
GLOBULIN SER CALC-MCNC: 3.3 G/DL (ref 2–4)
GLUCOSE SERPL-MCNC: 102 MG/DL (ref 74–99)
HCT VFR BLD AUTO: 32.8 % (ref 35–45)
HGB BLD-MCNC: 9.8 G/DL (ref 12–16)
INR PPP: 1 (ref 0.8–1.2)
LIPASE SERPL-CCNC: 167 U/L (ref 73–393)
LYMPHOCYTES # BLD: 2.4 K/UL (ref 0.9–3.6)
LYMPHOCYTES NFR BLD: 27 % (ref 21–52)
MAGNESIUM SERPL-MCNC: 1.9 MG/DL (ref 1.6–2.6)
MCH RBC QN AUTO: 26 PG (ref 24–34)
MCHC RBC AUTO-ENTMCNC: 29.9 G/DL (ref 31–37)
MCV RBC AUTO: 87 FL (ref 74–97)
MONOCYTES # BLD: 0.9 K/UL (ref 0.05–1.2)
MONOCYTES NFR BLD: 10 % (ref 3–10)
NEUTS SEG # BLD: 5.4 K/UL (ref 1.8–8)
NEUTS SEG NFR BLD: 60 % (ref 40–73)
PLATELET # BLD AUTO: 411 K/UL (ref 135–420)
PMV BLD AUTO: 10.2 FL (ref 9.2–11.8)
POTASSIUM SERPL-SCNC: 3.2 MMOL/L (ref 3.5–5.5)
PROT SERPL-MCNC: 6.7 G/DL (ref 6.4–8.2)
PROTHROMBIN TIME: 13 SEC (ref 11.5–15.2)
RBC # BLD AUTO: 3.77 M/UL (ref 4.2–5.3)
SODIUM SERPL-SCNC: 143 MMOL/L (ref 136–145)
WBC # BLD AUTO: 8.9 K/UL (ref 4.6–13.2)

## 2020-06-25 PROCEDURE — 85025 COMPLETE CBC W/AUTO DIFF WBC: CPT

## 2020-06-25 PROCEDURE — 83735 ASSAY OF MAGNESIUM: CPT

## 2020-06-25 PROCEDURE — 83690 ASSAY OF LIPASE: CPT

## 2020-06-25 PROCEDURE — 80076 HEPATIC FUNCTION PANEL: CPT

## 2020-06-25 PROCEDURE — 74011636320 HC RX REV CODE- 636/320: Performed by: EMERGENCY MEDICINE

## 2020-06-25 PROCEDURE — 74177 CT ABD & PELVIS W/CONTRAST: CPT

## 2020-06-25 PROCEDURE — 96375 TX/PRO/DX INJ NEW DRUG ADDON: CPT

## 2020-06-25 PROCEDURE — 96374 THER/PROPH/DIAG INJ IV PUSH: CPT

## 2020-06-25 PROCEDURE — 74011250637 HC RX REV CODE- 250/637: Performed by: EMERGENCY MEDICINE

## 2020-06-25 PROCEDURE — 85730 THROMBOPLASTIN TIME PARTIAL: CPT

## 2020-06-25 PROCEDURE — 85610 PROTHROMBIN TIME: CPT

## 2020-06-25 PROCEDURE — 83615 LACTATE (LD) (LDH) ENZYME: CPT

## 2020-06-25 PROCEDURE — 74011250636 HC RX REV CODE- 250/636: Performed by: EMERGENCY MEDICINE

## 2020-06-25 PROCEDURE — 80048 BASIC METABOLIC PNL TOTAL CA: CPT

## 2020-06-25 PROCEDURE — 99285 EMERGENCY DEPT VISIT HI MDM: CPT

## 2020-06-25 RX ORDER — HYDROXYZINE 25 MG/1
25 TABLET, FILM COATED ORAL
Status: DISCONTINUED | OUTPATIENT
Start: 2020-06-25 | End: 2020-06-26 | Stop reason: HOSPADM

## 2020-06-25 RX ORDER — QUETIAPINE FUMARATE 200 MG/1
200 TABLET, FILM COATED ORAL
Status: ON HOLD | COMMUNITY
End: 2022-03-23 | Stop reason: SDUPTHER

## 2020-06-25 RX ORDER — ESCITALOPRAM OXALATE 10 MG/1
10 TABLET ORAL DAILY
Status: ON HOLD | COMMUNITY
End: 2022-03-23 | Stop reason: SDUPTHER

## 2020-06-25 RX ORDER — IBUPROFEN 400 MG/1
400 TABLET ORAL
Status: COMPLETED | OUTPATIENT
Start: 2020-06-25 | End: 2020-06-25

## 2020-06-25 RX ORDER — SODIUM CHLORIDE 0.9 % (FLUSH) 0.9 %
5-40 SYRINGE (ML) INJECTION EVERY 8 HOURS
Status: DISCONTINUED | OUTPATIENT
Start: 2020-06-25 | End: 2020-06-26 | Stop reason: HOSPADM

## 2020-06-25 RX ORDER — SODIUM CHLORIDE 0.9 % (FLUSH) 0.9 %
5-40 SYRINGE (ML) INJECTION AS NEEDED
Status: DISCONTINUED | OUTPATIENT
Start: 2020-06-25 | End: 2020-06-26 | Stop reason: HOSPADM

## 2020-06-25 RX ORDER — KETOROLAC TROMETHAMINE 30 MG/ML
15 INJECTION, SOLUTION INTRAMUSCULAR; INTRAVENOUS
Status: COMPLETED | OUTPATIENT
Start: 2020-06-25 | End: 2020-06-25

## 2020-06-25 RX ORDER — DICYCLOMINE HYDROCHLORIDE 10 MG/1
20 CAPSULE ORAL
Status: COMPLETED | OUTPATIENT
Start: 2020-06-25 | End: 2020-06-25

## 2020-06-25 RX ORDER — QUETIAPINE FUMARATE 50 MG/1
50 TABLET, FILM COATED ORAL
COMMUNITY
End: 2022-03-23

## 2020-06-25 RX ORDER — ONDANSETRON 2 MG/ML
4 INJECTION INTRAMUSCULAR; INTRAVENOUS
Status: COMPLETED | OUTPATIENT
Start: 2020-06-25 | End: 2020-06-25

## 2020-06-25 RX ADMIN — KETOROLAC TROMETHAMINE 15 MG: 30 INJECTION, SOLUTION INTRAMUSCULAR at 20:53

## 2020-06-25 RX ADMIN — SODIUM CHLORIDE 500 ML: 900 INJECTION, SOLUTION INTRAVENOUS at 20:54

## 2020-06-25 RX ADMIN — ONDANSETRON 4 MG: 2 INJECTION INTRAMUSCULAR; INTRAVENOUS at 20:53

## 2020-06-25 RX ADMIN — DICYCLOMINE HYDROCHLORIDE 20 MG: 10 CAPSULE ORAL at 20:53

## 2020-06-25 RX ADMIN — IOPAMIDOL 100 ML: 612 INJECTION, SOLUTION INTRAVENOUS at 22:57

## 2020-06-25 RX ADMIN — IBUPROFEN 400 MG: 400 TABLET, FILM COATED ORAL at 23:37

## 2020-06-25 NOTE — ED PROVIDER NOTES
EMERGENCY DEPARTMENT HISTORY AND PHYSICAL EXAM    Date: 2020  Patient Name: Yovani Ragsdale    History of Presenting Illness     Chief Complaint   Patient presents with    Abdominal Pain          History Provided By: Patient    Additional History (Context):     8:15 PM    Yovani Ragsdale is a 30 y.o.  female with pertinent PMHx of asthma, heroine use, anxiety, Hepatitis C, chronic kidney infection, and endometriosis presenting ambulatory to the ED c/o left lower abdominal pain x last night. Pt describes the pain as aching sometimes and stabbing other times. States that it waxes and wanes, episodes last anywhere from a few minutes to 2-3 hours. Pain is aggravated with stress/anxiety or physical exertion. She reports associated intermittent nausea, constipation, loss of appetite, and some vomiting which she attributes to pain. Pt recently had a  and hysterectomy (about 7 weeks ago), states her depression has worsened since. Also notes urinary frequency in the morning and states she bruises easily. Denies any diarrhea, hematemesis, vaginal bleeding, fever, chills, or weight changes. PSHx of Leep procedure. Admits to smoking cigarettes, denies use of alcohol and has not used illicit drugs in four years. Family hx of DM and VF disease. PCP: None    There are no other complaints, changes, or physical findings at this time. Current Facility-Administered Medications   Medication Dose Route Frequency Provider Last Rate Last Dose    sodium chloride (NS) flush 5-40 mL  5-40 mL IntraVENous Q8H German Schwartz MD   Stopped at 20 2200    sodium chloride (NS) flush 5-40 mL  5-40 mL IntraVENous PRN German Schwartz MD        hydrOXYzine HCL (ATARAX) tablet 25 mg  25 mg Oral NOW German Schwartz MD         Current Outpatient Medications   Medication Sig Dispense Refill    escitalopram oxalate (Lexapro) 10 mg tablet Take 10 mg by mouth daily.       QUEtiapine (SEROqueL) 50 mg tablet Take 50 mg by mouth every six (6) hours as needed.  QUEtiapine (SEROqueL) 200 mg tablet Take 200 mg by mouth nightly. Past History     Past Medical History:  Past Medical History:   Diagnosis Date    Anxiety     Asthma     Depression     Drug use     Endometriosis     Hepatitis C     Heroin abuse ([facility] [address]     Kidney infection        Past Surgical History:  Past Surgical History:   Procedure Laterality Date    HX DILATION AND CURETTAGE      HX LEEP PROCEDURE         Family History:  No family history on file. Social History:  Social History     Tobacco Use    Smoking status: Current Every Day Smoker     Packs/day: 0.50     Years: 1.00     Pack years: 0.50    Smokeless tobacco: Never Used   Substance Use Topics    Alcohol use: Yes     Comment: Occasionally     Drug use: Yes     Types: Other, Heroin     Comment: spice       Allergies:  No Known Allergies      Review of Systems   Review of Systems   Constitutional: Negative for chills, diaphoresis, fever and unexpected weight change. HENT: Negative for ear pain, rhinorrhea and sore throat. Eyes: Negative for pain, discharge, redness and visual disturbance. Respiratory: Negative for cough, shortness of breath and wheezing. Cardiovascular: Negative for chest pain and palpitations. Gastrointestinal: Positive for abdominal pain (left lower), constipation, nausea (intermittent) and vomiting. Endocrine: Negative. Genitourinary: Positive for frequency. Negative for dysuria, hematuria and vaginal bleeding. Musculoskeletal: Negative for arthralgias, joint swelling and neck pain. Skin: Negative for color change, rash and wound. Allergic/Immunologic: Negative. Neurological: Negative for dizziness, weakness, numbness and headaches. Hematological: Bruises/bleeds easily. Psychiatric/Behavioral: Negative for behavioral problems, confusion and hallucinations.        Physical Exam     Vitals:    06/25/20 1918 06/25/20 2200 06/25/20 2244   BP: (!) 130/94 131/85 (!) 127/104   Pulse: 83     Resp: 18     Temp: 98.9 °F (37.2 °C)     SpO2: 100%     Weight: 48 kg (105 lb 14.4 oz)     Height: 4' 11\" (1.499 m)       Physical Exam  Vitals signs and nursing note reviewed. Constitutional:       General: She is not in acute distress. Appearance: She is well-developed. She is not diaphoretic. Comments: Pt appears uncomfortable but non toxic. HENT:      Head: Normocephalic and atraumatic. Right Ear: External ear normal.      Left Ear: External ear normal.      Mouth/Throat:      Pharynx: No oropharyngeal exudate. Eyes:      General: No scleral icterus. Conjunctiva/sclera: Conjunctivae normal.      Pupils: Pupils are equal, round, and reactive to light. Comments: No pallor. Neck:      Musculoskeletal: Normal range of motion and neck supple. Thyroid: No thyromegaly. Vascular: No JVD. Trachea: No tracheal deviation. Cardiovascular:      Rate and Rhythm: Normal rate and regular rhythm. Heart sounds: Normal heart sounds. Pulmonary:      Effort: Pulmonary effort is normal. No respiratory distress. Breath sounds: Normal breath sounds. No stridor. Abdominal:      General: Bowel sounds are normal. There is no distension. Palpations: Abdomen is soft. Tenderness: There is abdominal tenderness in the suprapubic area and left lower quadrant. There is no guarding or rebound. Comments: Tenderness greatest in the suprapubic region, mild tenderness of the LLQ. There is a well healed surgical scar on the lower abdomen from C section. Musculoskeletal: Normal range of motion. General: No tenderness. Lymphadenopathy:      Cervical: No cervical adenopathy. Skin:     General: Skin is warm and dry. Findings: No erythema or rash. Neurological:      Mental Status: She is alert and oriented to person, place, and time. Cranial Nerves: No cranial nerve deficit.       Coordination: Coordination normal.      Deep Tendon Reflexes: Reflexes are normal and symmetric. Reflexes normal.   Psychiatric:         Behavior: Behavior normal.         Thought Content: Thought content normal.         Judgment: Judgment normal.         Diagnostic Study Results     Labs -     Recent Results (from the past 12 hour(s))   CBC WITH AUTOMATED DIFF    Collection Time: 06/25/20  8:55 PM   Result Value Ref Range    WBC 8.9 4.6 - 13.2 K/uL    RBC 3.77 (L) 4.20 - 5.30 M/uL    HGB 9.8 (L) 12.0 - 16.0 g/dL    HCT 32.8 (L) 35.0 - 45.0 %    MCV 87.0 74.0 - 97.0 FL    MCH 26.0 24.0 - 34.0 PG    MCHC 29.9 (L) 31.0 - 37.0 g/dL    RDW 17.5 (H) 11.6 - 14.5 %    PLATELET 027 475 - 657 K/uL    MPV 10.2 9.2 - 11.8 FL    NEUTROPHILS 60 40 - 73 %    LYMPHOCYTES 27 21 - 52 %    MONOCYTES 10 3 - 10 %    EOSINOPHILS 2 0 - 5 %    BASOPHILS 1 0 - 2 %    ABS. NEUTROPHILS 5.4 1.8 - 8.0 K/UL    ABS. LYMPHOCYTES 2.4 0.9 - 3.6 K/UL    ABS. MONOCYTES 0.9 0.05 - 1.2 K/UL    ABS. EOSINOPHILS 0.2 0.0 - 0.4 K/UL    ABS. BASOPHILS 0.1 0.0 - 0.1 K/UL    DF AUTOMATED     LIPASE    Collection Time: 06/25/20  8:55 PM   Result Value Ref Range    Lipase 167 73 - 393 U/L   HEPATIC FUNCTION PANEL    Collection Time: 06/25/20  8:55 PM   Result Value Ref Range    Protein, total 6.7 6.4 - 8.2 g/dL    Albumin 3.4 3.4 - 5.0 g/dL    Globulin 3.3 2.0 - 4.0 g/dL    A-G Ratio 1.0 0.8 - 1.7      Bilirubin, total 0.3 0.2 - 1.0 MG/DL    Bilirubin, direct <0.1 0.0 - 0.2 MG/DL    Alk.  phosphatase 90 45 - 117 U/L    AST (SGOT) 38 10 - 38 U/L    ALT (SGPT) 50 13 - 56 U/L   MAGNESIUM    Collection Time: 06/25/20  8:55 PM   Result Value Ref Range    Magnesium 1.9 1.6 - 2.6 mg/dL   PROTHROMBIN TIME + INR    Collection Time: 06/25/20  8:55 PM   Result Value Ref Range    Prothrombin time 13.0 11.5 - 15.2 sec    INR 1.0 0.8 - 1.2     PTT    Collection Time: 06/25/20  8:55 PM   Result Value Ref Range    aPTT 27.5 23.0 - 74.8 SEC   METABOLIC PANEL, BASIC    Collection Time: 06/25/20  8:55 PM Result Value Ref Range    Sodium 143 136 - 145 mmol/L    Potassium 3.2 (L) 3.5 - 5.5 mmol/L    Chloride 108 100 - 111 mmol/L    CO2 28 21 - 32 mmol/L    Anion gap 7 3.0 - 18 mmol/L    Glucose 102 (H) 74 - 99 mg/dL    BUN 10 7.0 - 18 MG/DL    Creatinine 0.67 0.6 - 1.3 MG/DL    BUN/Creatinine ratio 15 12 - 20      GFR est AA >60 >60 ml/min/1.73m2    GFR est non-AA >60 >60 ml/min/1.73m2    Calcium 8.7 8.5 - 10.1 MG/DL       Radiologic Studies -   CT ABD PELV W CONT   Final Result   IMPRESSION:      Free fluid within the pelvis above physiologic levels. Consider recent cyst   rupture. No other significant abnormality seen. CT Results  (Last 48 hours)               06/25/20 2305  CT ABD PELV W CONT Final result    Impression:  IMPRESSION:       Free fluid within the pelvis above physiologic levels. Consider recent cyst   rupture. No other significant abnormality seen. Narrative:  EXAM: CT of the abdomen and pelvis       INDICATION: Abdominal pain. COMPARISON: None. TECHNIQUE: Axial CT imaging of the abdomen and pelvis was performed with   intravenous contrast. Multiplanar reformats were generated. Dose reduction   techniques used: automated exposure control, adjustment of the mAs and/or kVp   according to patient size, and iterative reconstruction techniques. Digital   imaging and communications in Medicine (DICOM) format image data are available   to nonaffiliated external healthcare facilities or entities on a secure, media   free, reciprocally searchable basis with patient authorization for at least 12   months after this study. _______________       FINDINGS:       LOWER CHEST: Unremarkable. LIVER, BILIARY: Liver is normal. No biliary dilation. Gallbladder is   unremarkable. PANCREAS: Normal.       SPLEEN: Normal.       ADRENALS: Normal.       KIDNEYS: Normal.       LYMPH NODES: No enlarged lymph nodes.        GASTROINTESTINAL TRACT: No bowel dilation or wall thickening. PELVIC ORGANS: Unremarkable. VASCULATURE: Unremarkable. BONES: No acute or aggressive osseous abnormalities identified. OTHER: There is free fluid within the pelvis.       _______________               CXR Results  (Last 48 hours)    None            Medical Decision Making   I am the first provider for this patient. I reviewed the vital signs, available nursing notes, past medical history, past surgical history, family history and social history. Vital Signs-Reviewed the patient's vital signs. Pulse Oximetry Analysis - 99% on RA     Cardiac Monitor:  Rate: 67 bpm  Rhythm: NSR    Records Reviewed: Nursing Notes and Old Medical Records    Provider Notes (Medical Decision Making): DDx: Homa Pruitt lady with pelvic and LLQ pain, between 1-2 months after delivery of child with hysterectomy. Would consider scar tissue or constipation as benign issues for which she has been treated already. Would more seriously consider partial obstruction, infectious process, abscess, ovarian torsion, colitis, or Chron's disease, especially in light of her underlying bleeding disorder.     PROCEDURES:  Procedures    MEDICATIONS GIVEN IN THE ED:  Medications   sodium chloride (NS) flush 5-40 mL (0 mL IntraVENous Held 6/25/20 2200)   sodium chloride (NS) flush 5-40 mL (has no administration in time range)   hydrOXYzine HCL (ATARAX) tablet 25 mg (25 mg Oral Refused 6/25/20 2307)   sodium chloride 0.9 % bolus infusion 500 mL (0 mL IntraVENous IV Completed 6/25/20 2154)   dicyclomine (BENTYL) capsule 20 mg (20 mg Oral Given 6/25/20 2053)   ondansetron (ZOFRAN) injection 4 mg (4 mg IntraVENous Given 6/25/20 2053)   ketorolac (TORADOL) injection 15 mg (15 mg IntraVENous Given 6/25/20 2053)   iopamidoL (ISOVUE 300) 61 % contrast injection 100 mL (100 mL IntraVENous Given 6/25/20 2257)   ibuprofen (MOTRIN) tablet 400 mg (400 mg Oral Given 6/25/20 2337)        ED COURSE:   8:15 PM   Initial assessment performed. Diagnosis and Disposition       DISCHARGE NOTE:  11:58 PM  The patient is ready for discharge. The patient's signs, symptoms, diagnosis, and discharge instructions have been discussed and the patient and/or family has conveyed their understanding. The patient and/or family is to follow up as recommended or return to the ER should their symptoms worsen. Plan has been discussed and the patient and/or family is in agreement. Written by Jong Mendez ED Scribe, as dictated by Taye Salomon MD.     CLINICAL IMPRESSION:  1. Pelvic pain    2. H/O abdominal hysterectomy    3. Chronic hepatitis C without hepatic coma (HCC)        PLAN:  1. D/C Home  2. Current Discharge Medication List        3. Follow-up Information    None       _______________________________    Attestations: This note is prepared by Jong Mendez, acting as Scribe for Cathleen Salomon MD.    Alissa. An Salomon MD:  The scribe's documentation has been prepared under my direction and personally reviewed by me in its entirety.  I confirm that the note above accurately reflects all work, treatment, procedures, and medical decision making performed by me.  _______________________________

## 2020-06-26 VITALS
HEIGHT: 59 IN | DIASTOLIC BLOOD PRESSURE: 90 MMHG | OXYGEN SATURATION: 100 % | TEMPERATURE: 98.9 F | BODY MASS INDEX: 21.35 KG/M2 | HEART RATE: 90 BPM | RESPIRATION RATE: 28 BRPM | SYSTOLIC BLOOD PRESSURE: 127 MMHG | WEIGHT: 105.9 LBS

## 2020-06-26 LAB
AMPHET UR QL SCN: NEGATIVE
APPEARANCE UR: CLEAR
BACTERIA URNS QL MICRO: ABNORMAL /HPF
BARBITURATES UR QL SCN: NEGATIVE
BENZODIAZ UR QL: NEGATIVE
BILIRUB UR QL: NEGATIVE
CANNABINOIDS UR QL SCN: NEGATIVE
COCAINE UR QL SCN: POSITIVE
COLOR UR: ABNORMAL
EPITH CASTS URNS QL MICRO: ABNORMAL /LPF (ref 0–5)
GLUCOSE UR STRIP.AUTO-MCNC: NEGATIVE MG/DL
HDSCOM,HDSCOM: ABNORMAL
HGB UR QL STRIP: NEGATIVE
KETONES UR QL STRIP.AUTO: 15 MG/DL
LDH SERPL L TO P-CCNC: 193 U/L (ref 81–234)
LEUKOCYTE ESTERASE UR QL STRIP.AUTO: ABNORMAL
METHADONE UR QL: NEGATIVE
NITRITE UR QL STRIP.AUTO: NEGATIVE
OPIATES UR QL: NEGATIVE
PCP UR QL: NEGATIVE
PH UR STRIP: 7 [PH] (ref 5–8)
PROT UR STRIP-MCNC: 30 MG/DL
RBC #/AREA URNS HPF: ABNORMAL /HPF (ref 0–5)
SP GR UR REFRACTOMETRY: >1.03 (ref 1–1.03)
UROBILINOGEN UR QL STRIP.AUTO: 1 EU/DL (ref 0.2–1)
WBC URNS QL MICRO: ABNORMAL /HPF (ref 0–5)

## 2020-06-26 PROCEDURE — 80307 DRUG TEST PRSMV CHEM ANLYZR: CPT

## 2020-06-26 PROCEDURE — 81001 URINALYSIS AUTO W/SCOPE: CPT

## 2020-06-26 PROCEDURE — 87086 URINE CULTURE/COLONY COUNT: CPT

## 2020-06-26 RX ORDER — DICYCLOMINE HYDROCHLORIDE 10 MG/1
20 CAPSULE ORAL
Qty: 2 CAP | Refills: 0 | Status: SHIPPED | OUTPATIENT
Start: 2020-06-26 | End: 2020-06-26

## 2020-06-26 RX ORDER — IBUPROFEN 600 MG/1
600 TABLET ORAL
Qty: 20 TAB | Refills: 0 | Status: SHIPPED | OUTPATIENT
Start: 2020-06-26 | End: 2021-08-17

## 2020-06-26 RX ORDER — HYDROCODONE BITARTRATE AND ACETAMINOPHEN 5; 325 MG/1; MG/1
1 TABLET ORAL
Qty: 8 TAB | Refills: 0 | Status: SHIPPED | OUTPATIENT
Start: 2020-06-26 | End: 2020-06-29

## 2020-06-26 NOTE — ED NOTES
Pt refused hydroxyzine. Pt stated it's benadryl. I explained that is was hydroxyzine which is a different medication from benadryl. Pt stated it's for itching and Im not itching. I explained it does help with itching but also helps with anxiety. Pt stated it just makes me tired and continued to refuse medication. Dr Dorina Alas notified.

## 2020-06-26 NOTE — DISCHARGE INSTRUCTIONS
Patient Education   Patient Education        Abdominal Hysterectomy: What to Expect at [address] [place] [address] Drive can expect to feel better and stronger each day, although you may need pain medicine for a week or two. You may get tired easily or have less energy than usual. This may last for several weeks after surgery. You will probably notice that your belly is swollen and puffy. This is common. The swelling will take several weeks to go down. It may take about 4 to 6 weeks to fully recover. It is important to avoid lifting while you are recovering so that you can heal.  This care sheet gives you a general idea about how long it will take for you to recover. But each person recovers at a different pace. Follow the steps below to get better as quickly as possible. How can you care for yourself at home? Activity  · Rest when you feel tired. Getting enough sleep will help you recover. · Try to walk each day. Start by walking a little more than you did the day before. Bit by bit, increase the amount you walk. Walking boosts blood flow and helps prevent pneumonia and constipation. · Avoid lifting anything that would make you strain. This may include a child, heavy grocery bags and milk containers, a heavy briefcase or backpack, cat litter or dog food bags, or a vacuum . · Avoid strenuous activities, such as biking, jogging, weight lifting, or aerobic exercise, until your doctor says it is okay. · You may shower. Pat the cut (incision) dry. Do not take a bath for the first 2 weeks, or until your doctor tells you it is okay. · Ask your doctor when you can drive again. · You will probably need to take 2 to 4 weeks off from work. It depends on the type of work you do and how you feel. · Your doctor will tell you when you can have sex again. Diet  · You can eat your normal diet. If your stomach is upset, try bland, low-fat foods like plain rice, broiled chicken, toast, and yogurt.   · Drink plenty of fluids (unless your doctor tells you not to). · You may notice that your bowel movements are not regular right after your surgery. This is common. Try to avoid constipation and straining with bowel movements. You may want to take a fiber supplement every day. If you have not had a bowel movement after a couple of days, ask your doctor about taking a mild laxative. Medicines  · Your doctor will tell you if and when you can restart your medicines. He or she will also give you instructions about taking any new medicines. · If you take aspirin or some other blood thinner, ask your doctor if and when to start taking it again. Make sure that you understand exactly what your doctor wants you to do. · Be safe with medicines. Take pain medicines exactly as directed. ? If the doctor gave you a prescription medicine for pain, take it as prescribed. ? If you are not taking a prescription pain medicine, ask your doctor if you can take an over-the-counter medicine. · If your doctor prescribed antibiotics, take them as directed. Do not stop taking them just because you feel better. You need to take the full course of antibiotics. · If you think your pain medicine is making you sick to your stomach:  ? Take your medicine after meals (unless your doctor has told you not to). ? Ask your doctor for a different pain medicine. Incision care  · If you have strips of tape on the cut (incision) the doctor made, leave the tape on for a week or until it falls off. Or follow your doctor's instructions for removing the tape. · Wash the area daily with warm, soapy water, and pat it dry. Don't use hydrogen peroxide or alcohol, which can slow healing. You may cover the area with a gauze bandage if it weeps or rubs against clothing. Change the bandage every day. · Keep the area clean and dry. Other instructions  · You may have some light vaginal bleeding. Wear sanitary pads if needed. Do not douche or use tampons.   Follow-up care is a key part of your treatment and safety. Be sure to make and go to all appointments, and call your doctor if you are having problems. It's also a good idea to know your test results and keep a list of the medicines you take. When should you call for help? [id number] anytime you think you may need emergency care. For example, call if:  · You passed out (lost consciousness). · You have chest pain, are short of breath, or cough up blood. Call your doctor now or seek immediate medical care if:  · You have pain that does not get better after you take pain medicine. · You cannot pass stools or gas. · You have vaginal discharge that has increased in amount or smells bad. · You are sick to your stomach or cannot drink fluids. · You have loose stitches, or your incision comes open. · Bright red blood has soaked through the bandage over your incision. · You have signs of infection, such as:  ? Increased pain, swelling, warmth, or redness. ? Red streaks leading from the incision. ? Pus draining from the incision. ? A fever. · You have bright red vaginal bleeding that soaks one or more pads in an hour, or you have large clots. · You have signs of a blood clot in your leg (called a deep vein thrombosis), such as:  ? Pain in your calf, back of the knee, thigh, or groin. ? Redness and swelling in your leg. Watch closely for changes in your health, and be sure to contact your doctor if you have any problems. Where can you learn more? Go to http://shruti-pilo.info/  Enter M280 in the search box to learn more about \"Abdominal Hysterectomy: What to Expect at Home. \"  Current as of: November 8, 2019               Content Version: 12.5  © 8731-0864 Healthwise, Incorporated. Care instructions adapted under license by [employer] (which disclaims liability or warranty for this information).  If you have questions about a medical condition or this instruction, always ask your healthcare professional. [address] any warranty or liability for your use of this information. Pelvic Pain: Care Instructions  Your Care Instructions     Pelvic pain, or pain in the lower belly, can have many causes. Often pelvic pain is not serious and gets better in a few days. If your pain continues or gets worse, you may need tests and treatment. Tell your doctor about any new symptoms. These may be signs of a serious problem. Follow-up care is a key part of your treatment and safety. Be sure to make and go to all appointments, and call your doctor if you are having problems. It's also a good idea to know your test results and keep a list of the medicines you take. How can you care for yourself at home? · Rest until you feel better. Lie down, and raise your legs by placing a pillow under your knees. · Drink plenty of fluids. You may find that small, frequent sips are easier on your stomach than if you drink a lot at once. Avoid drinks with carbonation or caffeine, such as soda pop, tea, or coffee. · Try eating several small meals instead of 2 or 3 large ones. Eat mild foods, such as rice, dry toast or crackers, bananas, and applesauce. Avoid fatty and spicy foods, other fruits, and alcohol until 48 hours after your symptoms have gone away. · Take an over-the-counter pain medicine, such as acetaminophen (Tylenol), ibuprofen (Advil, Motrin), or naproxen (Aleve). Read and follow all instructions on the label. · Do not take two or more pain medicines at the same time unless the doctor told you to. Many pain medicines have acetaminophen, which is Tylenol. Too much acetaminophen (Tylenol) can be harmful. · You can put a heating pad, a warm cloth, or moist heat on your belly to relieve pain. When should you call for help? Call your doctor now or seek immediate medical care if:  · You have a new or higher fever. · You have unusual vaginal bleeding.   · You have new or worse belly or pelvic pain. · You have vaginal discharge that has increased in amount or smells bad. Watch closely for changes in your health, and be sure to contact your doctor if:  · You do not get better as expected. Where can you learn more? Go to http://shruti-pilo.info/  Enter B514 in the search box to learn more about \"Pelvic Pain: Care Instructions. \"  Current as of: November 8, 2019               Content Version: 12.5  © 5244-2561 Healthwise, Incorporated. Care instructions adapted under license by [employer] (which disclaims liability or warranty for this information). If you have questions about a medical condition or this instruction, always ask your healthcare professional. [address] any warranty or liability for your use of this information.

## 2020-06-26 NOTE — ED NOTES
Pt requesting ibuprofen for pain. Dr Qing Mccann notified and ordered this. Med given. Pt requesting to talk to doctor again. Dr Qing Mccann notified.

## 2020-06-26 NOTE — ED NOTES
Urine collected and sent to lab. Pt agitated and hyperventilating. Pt states she wants to speak to a patient advocate. I said the house supervisor was here and could talk to her. Pt asking for ambulance to transport her to [facility] ED. I explained she was discharged and it wasn't medically indicated to send her to another ED. I asked pt if she would like to talk to the house supervisor and the pt stated she couldn't wait, she needed to leave. pt pulled off bp cuff and refused recheck of BP. Pt given prescriptions and d/c instructions. Pt ambulated from w/o difficulty.

## 2020-06-26 NOTE — ED NOTES
Pt asking for another hot pack. Pt given hot pack wrapped in a pillowcase. Explained after this cools down to have a rest period of no heat as continuous heat can cause skin damage. Pt hyperventilating and c/o anxiety. Dr Enrique Bray came into the room as I was talking with pt and stated he would put in additional medication orders. Pt took herself off the bp/pulse ox monitor. She said no one was watching it. Explained we could watch it remotely.

## 2020-06-27 LAB
BACTERIA SPEC CULT: NORMAL
SERVICE CMNT-IMP: NORMAL

## 2021-08-17 ENCOUNTER — HOSPITAL ENCOUNTER (EMERGENCY)
Age: 36
Discharge: HOME | End: 2021-08-17
Attending: EMERGENCY MEDICINE
Payer: MEDICAID

## 2021-08-17 VITALS
RESPIRATION RATE: 20 BRPM | HEART RATE: 93 BPM | DIASTOLIC BLOOD PRESSURE: 78 MMHG | WEIGHT: 100 LBS | TEMPERATURE: 97.3 F | SYSTOLIC BLOOD PRESSURE: 111 MMHG | HEIGHT: 59 IN | BODY MASS INDEX: 20.16 KG/M2 | OXYGEN SATURATION: 100 %

## 2021-08-17 DIAGNOSIS — F19.90 ILLICIT DRUG USE: ICD-10-CM

## 2021-08-17 DIAGNOSIS — F41.1 ANXIETY STATE: Primary | ICD-10-CM

## 2021-08-17 LAB
ALBUMIN SERPL-MCNC: 3.7 G/DL (ref 3.4–5)
ALBUMIN/GLOB SERPL: 1.3 {RATIO} (ref 0.8–1.7)
ALP SERPL-CCNC: 79 U/L (ref 45–117)
ALT SERPL-CCNC: 51 U/L (ref 13–56)
AMPHET UR QL SCN: NEGATIVE
ANION GAP SERPL CALC-SCNC: 4 MMOL/L (ref 3–18)
APAP SERPL-MCNC: <2 UG/ML (ref 10–30)
APPEARANCE UR: CLEAR
AST SERPL-CCNC: 27 U/L (ref 10–38)
BACTERIA URNS QL MICRO: ABNORMAL /HPF
BARBITURATES UR QL SCN: NEGATIVE
BASOPHILS # BLD: 0.1 K/UL (ref 0–0.1)
BASOPHILS NFR BLD: 1 % (ref 0–2)
BENZODIAZ UR QL: NEGATIVE
BILIRUB SERPL-MCNC: 0.8 MG/DL (ref 0.2–1)
BILIRUB UR QL: NEGATIVE
BUN SERPL-MCNC: 7 MG/DL (ref 7–18)
BUN/CREAT SERPL: 18 (ref 12–20)
CALCIUM SERPL-MCNC: 8.8 MG/DL (ref 8.5–10.1)
CANNABINOIDS UR QL SCN: NEGATIVE
CHLORIDE SERPL-SCNC: 104 MMOL/L (ref 100–111)
CO2 SERPL-SCNC: 29 MMOL/L (ref 21–32)
COCAINE UR QL SCN: POSITIVE
COLOR UR: ABNORMAL
CREAT SERPL-MCNC: 0.4 MG/DL (ref 0.6–1.3)
DIFFERENTIAL METHOD BLD: ABNORMAL
EOSINOPHIL # BLD: 0.2 K/UL (ref 0–0.4)
EOSINOPHIL NFR BLD: 2 % (ref 0–5)
EPITH CASTS URNS QL MICRO: ABNORMAL /LPF (ref 0–5)
ERYTHROCYTE [DISTWIDTH] IN BLOOD BY AUTOMATED COUNT: 14.3 % (ref 11.6–14.5)
ETHANOL SERPL-MCNC: <3 MG/DL (ref 0–3)
GLOBULIN SER CALC-MCNC: 2.8 G/DL (ref 2–4)
GLUCOSE SERPL-MCNC: 84 MG/DL (ref 74–99)
GLUCOSE UR STRIP.AUTO-MCNC: NEGATIVE MG/DL
HCG UR QL: NEGATIVE
HCG UR QL: NEGATIVE
HCT VFR BLD AUTO: 40.8 % (ref 35–45)
HDSCOM,HDSCOM: ABNORMAL
HGB BLD-MCNC: 13.9 G/DL (ref 12–16)
HGB UR QL STRIP: NEGATIVE
HYALINE CASTS URNS QL MICRO: ABNORMAL /LPF (ref 0–2)
KETONES UR QL STRIP.AUTO: 40 MG/DL
LEUKOCYTE ESTERASE UR QL STRIP.AUTO: ABNORMAL
LYMPHOCYTES # BLD: 1.9 K/UL (ref 0.9–3.6)
LYMPHOCYTES NFR BLD: 19 % (ref 21–52)
MAGNESIUM SERPL-MCNC: 1.8 MG/DL (ref 1.6–2.6)
MCH RBC QN AUTO: 28.9 PG (ref 24–34)
MCHC RBC AUTO-ENTMCNC: 34.1 G/DL (ref 31–37)
MCV RBC AUTO: 84.8 FL (ref 74–97)
METHADONE UR QL: NEGATIVE
MONOCYTES # BLD: 1.1 K/UL (ref 0.05–1.2)
MONOCYTES NFR BLD: 11 % (ref 3–10)
NEUTS SEG # BLD: 6.8 K/UL (ref 1.8–8)
NEUTS SEG NFR BLD: 67 % (ref 40–73)
NITRITE UR QL STRIP.AUTO: NEGATIVE
OPIATES UR QL: POSITIVE
PCP UR QL: NEGATIVE
PH UR STRIP: 6.5 [PH] (ref 5–8)
PLATELET # BLD AUTO: 268 K/UL (ref 135–420)
PMV BLD AUTO: 10.7 FL (ref 9.2–11.8)
POTASSIUM SERPL-SCNC: 3.5 MMOL/L (ref 3.5–5.5)
PROT SERPL-MCNC: 6.5 G/DL (ref 6.4–8.2)
PROT UR STRIP-MCNC: ABNORMAL MG/DL
RBC # BLD AUTO: 4.81 M/UL (ref 4.2–5.3)
RBC #/AREA URNS HPF: ABNORMAL /HPF (ref 0–5)
SALICYLATES SERPL-MCNC: <1.7 MG/DL (ref 2.8–20)
SODIUM SERPL-SCNC: 137 MMOL/L (ref 136–145)
SP GR UR REFRACTOMETRY: 1.02 (ref 1–1.03)
UROBILINOGEN UR QL STRIP.AUTO: 1 EU/DL (ref 0.2–1)
WBC # BLD AUTO: 10 K/UL (ref 4.6–13.2)
WBC URNS QL MICRO: ABNORMAL /HPF (ref 0–5)

## 2021-08-17 PROCEDURE — 81001 URINALYSIS AUTO W/SCOPE: CPT

## 2021-08-17 PROCEDURE — 80053 COMPREHEN METABOLIC PANEL: CPT

## 2021-08-17 PROCEDURE — 85025 COMPLETE CBC W/AUTO DIFF WBC: CPT

## 2021-08-17 PROCEDURE — 80143 DRUG ASSAY ACETAMINOPHEN: CPT

## 2021-08-17 PROCEDURE — 82077 ASSAY SPEC XCP UR&BREATH IA: CPT

## 2021-08-17 PROCEDURE — 81025 URINE PREGNANCY TEST: CPT

## 2021-08-17 PROCEDURE — 80307 DRUG TEST PRSMV CHEM ANLYZR: CPT

## 2021-08-17 PROCEDURE — 80179 DRUG ASSAY SALICYLATE: CPT

## 2021-08-17 PROCEDURE — 83735 ASSAY OF MAGNESIUM: CPT

## 2021-08-17 PROCEDURE — 99284 EMERGENCY DEPT VISIT MOD MDM: CPT

## 2021-08-17 PROCEDURE — 36415 COLL VENOUS BLD VENIPUNCTURE: CPT

## 2021-08-17 NOTE — DISCHARGE INSTRUCTIONS
Regular medications for anxiety/depression  Avoid illicit drug use  Go home and rest  Worse return to ER

## 2021-08-17 NOTE — ED PROVIDER NOTES
EMERGENCY DEPARTMENT HISTORY AND PHYSICAL EXAM    Date: 8/17/2021  Patient Name: Bret Mar    History of Presenting Illness     Time Seen: 4:08 PM    Chief Complaint   Patient presents with    Anxiety       History Provided By: Patient    Additional History (Context):   Bret Mar is a 31 y.o. female's emergency room with complaints of anxiety, depression. Ongoing issues. Not sleeping. In an attempt to obtain more information from this patient, kept falling asleep. Also eyes rolling back and head. Arousable. Not present with anybody else in the room. PCP: None    Current Outpatient Medications   Medication Sig Dispense Refill    escitalopram oxalate (Lexapro) 10 mg tablet Take 10 mg by mouth daily.  QUEtiapine (SEROqueL) 200 mg tablet Take 200 mg by mouth nightly.  QUEtiapine (SEROqueL) 50 mg tablet Take 50 mg by mouth every six (6) hours as needed. Past History     Past Medical History:  Past Medical History:   Diagnosis Date    Anxiety     Asthma     Depression     Drug use     Endometriosis     Hepatitis C     Heroin abuse ([facility] [address]     Kidney infection        Past Surgical History:  Past Surgical History:   Procedure Laterality Date    HX DILATION AND CURETTAGE      HX LEEP PROCEDURE         Family History:  History reviewed. No pertinent family history. Social History:  Social History     Tobacco Use    Smoking status: Current Every Day Smoker     Packs/day: 0.50     Years: 1.00     Pack years: 0.50    Smokeless tobacco: Never Used   Substance Use Topics    Alcohol use: Yes     Comment: Occasionally     Drug use: Yes     Types:  Other, Heroin     Comment: spice       Allergies:  No Known Allergies      Review of Systems   Review of Systems   Unable to perform ROS: Other   Patient would not answer questions  Sleepy    Physical Exam     Vitals:    08/17/21 1450   BP: 111/78   Pulse: 93   Resp: 20   Temp: 97.3 °F (36.3 °C)   SpO2: 100%   Weight: 45.4 kg (100 lb) Height: 4' 11\" (1.499 m)     Physical Exam  Vitals and nursing note reviewed. Constitutional:       General: She is sleeping. She is not in acute distress. Appearance: Normal appearance. She is well-developed, well-groomed and normal weight. She is not ill-appearing. Cardiovascular:      Rate and Rhythm: Normal rate and regular rhythm. Heart sounds: Normal heart sounds. Pulmonary:      Effort: Pulmonary effort is normal.      Breath sounds: Normal breath sounds. Skin:     General: Skin is warm and dry. Neurological:      Mental Status: She is easily aroused. She is lethargic. Cranial Nerves: Cranial nerves are intact. Psychiatric:         Attention and Perception: She is inattentive. Mood and Affect: Affect is inappropriate. Speech: Speech is slurred. Behavior: Behavior is slowed. Thought Content: Thought content does not include homicidal or suicidal ideation. Thought content does not include suicidal plan. Nursing note and vitals reviewed         Diagnostic Study Results     Labs   Preg neg  CBC neg  UA neg  Chemistries neg  No evidence of attempted ASA or  Tylenol OD  Urine positive for cocaine use, opiate use      Radiologic Studies   No orders to display     CT Results  (Last 48 hours)    None        CXR Results  (Last 48 hours)    None            Medical Decision Making   I am the first provider for this patient. I reviewed the vital signs, available nursing notes, past medical history, past surgical history, family history and social history. Vital Signs-Reviewed the patient's vital signs.     Pulse Oximetry Analysis 100 % RA    Records Reviewed:Nursing notes, old ER records    DDX:  Anxiety / depression  Strongly suspect drug use / alcohol use  Similar issues in the past    Provider Notes:   31 y.o. female   Very difficult to arouse during H&P, exam.  Poor historian    Procedures:  Procedures    ED Course:   Initial assessment performed. The patients presenting problems have been discussed, and they are in agreement with the care plan formulated and outlined with them. I have encouraged them to ask questions as they arise throughout their visit. ED Physician Discussion Note:   Patient remained asleep majority of her evaluation here  Did not feel patient harm to herself  Can follow up outpatient with PCP / Psych    Diagnosis and Disposition       DISCHARGE NOTE:  Viviane Alcala's  results have been reviewed with her. She has been counseled regarding her diagnosis, treatment, and plan. She verbally conveys understanding and agreement of the signs, symptoms, diagnosis, treatment and prognosis and additionally agrees to follow up as discussed. She also agrees with the care-plan and conveys that all of her questions have been answered. I have also provided discharge instructions for her that include: educational information regarding their diagnosis and treatment, and list of reasons why they would want to return to the ED prior to their follow-up appointment, should her condition change. She has been provided with education for proper emergency department utilization. CLINICAL IMPRESSION:    1. Anxiety state    2. Illicit drug use        PLAN:  1. D/C Home  2. Discharge Medication List as of 8/17/2021  7:14 PM        3. Follow-up Information     Follow up With Specialties Details Why Contact Info    PCP  Call  PCP for follow-up care regarding her anxiety/depression issues     [facility] EMERGENCY DEPT Emergency Medicine  If symptoms worsen, As needed [address]  [facility] [address] [phone]        ____________________________________     Please note that this dictation was completed with [employer], the computer voice recognition software. Quite often unanticipated grammatical, syntax, homophones, and other interpretive errors are inadvertently transcribed by the computer software. Please disregard these errors. Please excuse any errors that have escaped final proofreading. Attending Physician Addendum: This patient was evaluated separately by the midlevel provider, Pankaj Keating PA-C. I did not personally evaluate the patient but was immediately available for consultation if needed. After review of patient's chart, vital signs, results of objective studies and H&P note, I agree with management above. My impression is Anxiety State, Polysubstance Abuse.     Diana King, [address] [facility]  Emergency Physician

## 2021-08-17 NOTE — ED TRIAGE NOTES
Patient tearful and states that she has been in a panic attack for 2 days. Patient states that she cuts but she is not suicidal. Patient states that she is hyperventilating and just wants to run.

## 2022-03-22 ENCOUNTER — HOSPITAL ENCOUNTER (INPATIENT)
Age: 37
LOS: 1 days | Discharge: HOME | DRG: 751 | End: 2022-03-23
Attending: PSYCHIATRY & NEUROLOGY | Admitting: PSYCHIATRY & NEUROLOGY
Payer: MEDICAID

## 2022-03-22 ENCOUNTER — HOSPITAL ENCOUNTER (EMERGENCY)
Age: 37
Discharge: ANOTHER HOSPITAL | End: 2022-03-22
Attending: STUDENT IN AN ORGANIZED HEALTH CARE EDUCATION/TRAINING PROGRAM
Payer: MEDICAID

## 2022-03-22 VITALS
OXYGEN SATURATION: 98 % | HEIGHT: 59 IN | HEART RATE: 66 BPM | RESPIRATION RATE: 18 BRPM | SYSTOLIC BLOOD PRESSURE: 110 MMHG | DIASTOLIC BLOOD PRESSURE: 68 MMHG | BODY MASS INDEX: 22.78 KG/M2 | TEMPERATURE: 97.5 F | WEIGHT: 113 LBS

## 2022-03-22 VITALS
TEMPERATURE: 96.8 F | RESPIRATION RATE: 18 BRPM | HEART RATE: 82 BPM | DIASTOLIC BLOOD PRESSURE: 72 MMHG | SYSTOLIC BLOOD PRESSURE: 106 MMHG

## 2022-03-22 DIAGNOSIS — R45.851 SUICIDAL IDEATION: Primary | ICD-10-CM

## 2022-03-22 DIAGNOSIS — N30.00 ACUTE CYSTITIS WITHOUT HEMATURIA: ICD-10-CM

## 2022-03-22 DIAGNOSIS — A54.9 GONORRHEA IN FEMALE: ICD-10-CM

## 2022-03-22 PROBLEM — F32.A DEPRESSION: Status: ACTIVE | Noted: 2022-03-22

## 2022-03-22 LAB
ALBUMIN SERPL-MCNC: 3.7 G/DL (ref 3.4–5)
ALBUMIN/GLOB SERPL: 1.1 {RATIO} (ref 0.8–1.7)
ALP SERPL-CCNC: 70 U/L (ref 45–117)
ALT SERPL-CCNC: 56 U/L (ref 13–56)
AMPHET UR QL SCN: NEGATIVE
ANION GAP SERPL CALC-SCNC: 4 MMOL/L (ref 3–18)
APAP SERPL-MCNC: <2 UG/ML (ref 10–30)
APPEARANCE UR: CLEAR
AST SERPL-CCNC: 49 U/L (ref 10–38)
B PERT DNA SPEC QL NAA+PROBE: NOT DETECTED
BACTERIA URNS QL MICRO: ABNORMAL /HPF
BARBITURATES UR QL SCN: NEGATIVE
BASOPHILS # BLD: 0.1 K/UL (ref 0–0.1)
BASOPHILS NFR BLD: 1 % (ref 0–2)
BENZODIAZ UR QL: NEGATIVE
BILIRUB SERPL-MCNC: 1 MG/DL (ref 0.2–1)
BILIRUB UR QL: ABNORMAL
BORDETELLA PARAPERTUSSIS PCR, BORPAR: NOT DETECTED
BUN SERPL-MCNC: 12 MG/DL (ref 7–18)
BUN/CREAT SERPL: 20 (ref 12–20)
C PNEUM DNA SPEC QL NAA+PROBE: NOT DETECTED
CALCIUM SERPL-MCNC: 8.8 MG/DL (ref 8.5–10.1)
CANNABINOIDS UR QL SCN: POSITIVE
CHLORIDE SERPL-SCNC: 110 MMOL/L (ref 100–111)
CO2 SERPL-SCNC: 25 MMOL/L (ref 21–32)
COCAINE UR QL SCN: POSITIVE
COLOR UR: ABNORMAL
COVID-19 RAPID TEST, COVR: NOT DETECTED
CREAT SERPL-MCNC: 0.59 MG/DL (ref 0.6–1.3)
DIFFERENTIAL METHOD BLD: ABNORMAL
EOSINOPHIL # BLD: 0.2 K/UL (ref 0–0.4)
EOSINOPHIL NFR BLD: 2 % (ref 0–5)
EPITH CASTS URNS QL MICRO: ABNORMAL /LPF (ref 0–5)
ERYTHROCYTE [DISTWIDTH] IN BLOOD BY AUTOMATED COUNT: 14.4 % (ref 11.6–14.5)
ETHANOL SERPL-MCNC: <3 MG/DL (ref 0–3)
FLUAV H1 2009 PAND RNA SPEC QL NAA+PROBE: NOT DETECTED
FLUAV H1 RNA SPEC QL NAA+PROBE: NOT DETECTED
FLUAV H3 RNA SPEC QL NAA+PROBE: NOT DETECTED
FLUAV SUBTYP SPEC NAA+PROBE: NOT DETECTED
FLUBV RNA SPEC QL NAA+PROBE: NOT DETECTED
GLOBULIN SER CALC-MCNC: 3.4 G/DL (ref 2–4)
GLUCOSE SERPL-MCNC: 75 MG/DL (ref 74–99)
GLUCOSE UR STRIP.AUTO-MCNC: NEGATIVE MG/DL
HADV DNA SPEC QL NAA+PROBE: NOT DETECTED
HCG UR QL: NEGATIVE
HCOV 229E RNA SPEC QL NAA+PROBE: NOT DETECTED
HCOV HKU1 RNA SPEC QL NAA+PROBE: NOT DETECTED
HCOV NL63 RNA SPEC QL NAA+PROBE: NOT DETECTED
HCOV OC43 RNA SPEC QL NAA+PROBE: NOT DETECTED
HCT VFR BLD AUTO: 44.1 % (ref 35–45)
HDSCOM,HDSCOM: ABNORMAL
HGB BLD-MCNC: 14.3 G/DL (ref 12–16)
HGB UR QL STRIP: NEGATIVE
HMPV RNA SPEC QL NAA+PROBE: NOT DETECTED
HPIV1 RNA SPEC QL NAA+PROBE: NOT DETECTED
HPIV2 RNA SPEC QL NAA+PROBE: NOT DETECTED
HPIV3 RNA SPEC QL NAA+PROBE: NOT DETECTED
HPIV4 RNA SPEC QL NAA+PROBE: NOT DETECTED
IMM GRANULOCYTES # BLD AUTO: 0 K/UL (ref 0–0.04)
IMM GRANULOCYTES NFR BLD AUTO: 0 % (ref 0–0.5)
KETONES UR QL STRIP.AUTO: 80 MG/DL
LEUKOCYTE ESTERASE UR QL STRIP.AUTO: ABNORMAL
LYMPHOCYTES # BLD: 3.9 K/UL (ref 0.9–3.6)
LYMPHOCYTES NFR BLD: 35 % (ref 21–52)
M PNEUMO DNA SPEC QL NAA+PROBE: NOT DETECTED
MCH RBC QN AUTO: 28.9 PG (ref 24–34)
MCHC RBC AUTO-ENTMCNC: 32.4 G/DL (ref 31–37)
MCV RBC AUTO: 89.3 FL (ref 78–100)
METHADONE UR QL: NEGATIVE
MONOCYTES # BLD: 1.3 K/UL (ref 0.05–1.2)
MONOCYTES NFR BLD: 12 % (ref 3–10)
MUCOUS THREADS URNS QL MICRO: ABNORMAL /LPF
NEUTS SEG # BLD: 5.5 K/UL (ref 1.8–8)
NEUTS SEG NFR BLD: 50 % (ref 40–73)
NITRITE UR QL STRIP.AUTO: NEGATIVE
NRBC # BLD: 0 K/UL (ref 0–0.01)
NRBC BLD-RTO: 0 PER 100 WBC
OPIATES UR QL: NEGATIVE
PCP UR QL: NEGATIVE
PH UR STRIP: 6 [PH] (ref 5–8)
PLATELET # BLD AUTO: 248 K/UL (ref 135–420)
PMV BLD AUTO: 10.8 FL (ref 9.2–11.8)
POTASSIUM SERPL-SCNC: 4.8 MMOL/L (ref 3.5–5.5)
PROT SERPL-MCNC: 7.1 G/DL (ref 6.4–8.2)
PROT UR STRIP-MCNC: 30 MG/DL
RBC # BLD AUTO: 4.94 M/UL (ref 4.2–5.3)
RBC #/AREA URNS HPF: ABNORMAL /HPF (ref 0–5)
RSV RNA SPEC QL NAA+PROBE: NOT DETECTED
RV+EV RNA SPEC QL NAA+PROBE: NOT DETECTED
SALICYLATES SERPL-MCNC: 3.2 MG/DL (ref 2.8–20)
SARS-COV-2 PCR, COVPCR: NOT DETECTED
SODIUM SERPL-SCNC: 139 MMOL/L (ref 136–145)
SOURCE, COVRS: NORMAL
SP GR UR REFRACTOMETRY: >1.03 (ref 1–1.03)
UROBILINOGEN UR QL STRIP.AUTO: 1 EU/DL (ref 0.2–1)
WBC # BLD AUTO: 11.1 K/UL (ref 4.6–13.2)
WBC URNS QL MICRO: ABNORMAL /HPF (ref 0–5)

## 2022-03-22 PROCEDURE — 0202U NFCT DS 22 TRGT SARS-COV-2: CPT

## 2022-03-22 PROCEDURE — 81001 URINALYSIS AUTO W/SCOPE: CPT

## 2022-03-22 PROCEDURE — 99285 EMERGENCY DEPT VISIT HI MDM: CPT

## 2022-03-22 PROCEDURE — 87635 SARS-COV-2 COVID-19 AMP PRB: CPT

## 2022-03-22 PROCEDURE — 74011250637 HC RX REV CODE- 250/637: Performed by: STUDENT IN AN ORGANIZED HEALTH CARE EDUCATION/TRAINING PROGRAM

## 2022-03-22 PROCEDURE — 96372 THER/PROPH/DIAG INJ SC/IM: CPT

## 2022-03-22 PROCEDURE — 65220000003 HC RM SEMIPRIVATE PSYCH

## 2022-03-22 PROCEDURE — 80307 DRUG TEST PRSMV CHEM ANLYZR: CPT

## 2022-03-22 PROCEDURE — 93005 ELECTROCARDIOGRAM TRACING: CPT

## 2022-03-22 PROCEDURE — 80179 DRUG ASSAY SALICYLATE: CPT

## 2022-03-22 PROCEDURE — 80143 DRUG ASSAY ACETAMINOPHEN: CPT

## 2022-03-22 PROCEDURE — 74011250636 HC RX REV CODE- 250/636: Performed by: STUDENT IN AN ORGANIZED HEALTH CARE EDUCATION/TRAINING PROGRAM

## 2022-03-22 PROCEDURE — 74011000250 HC RX REV CODE- 250: Performed by: STUDENT IN AN ORGANIZED HEALTH CARE EDUCATION/TRAINING PROGRAM

## 2022-03-22 PROCEDURE — 74011250637 HC RX REV CODE- 250/637: Performed by: PSYCHIATRY & NEUROLOGY

## 2022-03-22 PROCEDURE — 81025 URINE PREGNANCY TEST: CPT

## 2022-03-22 PROCEDURE — 80053 COMPREHEN METABOLIC PANEL: CPT

## 2022-03-22 PROCEDURE — 85025 COMPLETE CBC W/AUTO DIFF WBC: CPT

## 2022-03-22 PROCEDURE — 82077 ASSAY SPEC XCP UR&BREATH IA: CPT

## 2022-03-22 RX ORDER — TRAZODONE HYDROCHLORIDE 50 MG/1
50 TABLET ORAL
Status: DISCONTINUED | OUTPATIENT
Start: 2022-03-22 | End: 2022-03-23 | Stop reason: HOSPADM

## 2022-03-22 RX ORDER — DOXYCYCLINE HYCLATE 100 MG
100 TABLET ORAL 2 TIMES DAILY
Qty: 20 TABLET | Refills: 0 | Status: SHIPPED | OUTPATIENT
Start: 2022-03-22 | End: 2022-03-23

## 2022-03-22 RX ORDER — DOXYCYCLINE 100 MG/1
100 CAPSULE ORAL EVERY 12 HOURS
Status: DISCONTINUED | OUTPATIENT
Start: 2022-03-22 | End: 2022-03-22 | Stop reason: HOSPADM

## 2022-03-22 RX ORDER — ACETAMINOPHEN 325 MG/1
650 TABLET ORAL
Status: DISCONTINUED | OUTPATIENT
Start: 2022-03-22 | End: 2022-03-23 | Stop reason: HOSPADM

## 2022-03-22 RX ORDER — IBUPROFEN 200 MG
1 TABLET ORAL DAILY
Status: DISCONTINUED | OUTPATIENT
Start: 2022-03-22 | End: 2022-03-22 | Stop reason: HOSPADM

## 2022-03-22 RX ORDER — HYDROXYZINE PAMOATE 50 MG/1
50 CAPSULE ORAL
Status: DISCONTINUED | OUTPATIENT
Start: 2022-03-22 | End: 2022-03-23 | Stop reason: HOSPADM

## 2022-03-22 RX ORDER — DOXYCYCLINE 50 MG/1
100 CAPSULE ORAL EVERY 12 HOURS
Status: DISCONTINUED | OUTPATIENT
Start: 2022-03-22 | End: 2022-03-22 | Stop reason: DRUGHIGH

## 2022-03-22 RX ORDER — DOXYCYCLINE 100 MG/1
100 CAPSULE ORAL EVERY 12 HOURS
Status: DISCONTINUED | OUTPATIENT
Start: 2022-03-22 | End: 2022-03-23 | Stop reason: HOSPADM

## 2022-03-22 RX ADMIN — TRAZODONE HYDROCHLORIDE 50 MG: 50 TABLET ORAL at 20:49

## 2022-03-22 RX ADMIN — CEFTRIAXONE 500 MG: 500 INJECTION, POWDER, FOR SOLUTION INTRAMUSCULAR; INTRAVENOUS at 09:17

## 2022-03-22 RX ADMIN — DOXYCYCLINE HYCLATE 100 MG: 100 CAPSULE ORAL at 20:49

## 2022-03-22 RX ADMIN — HYDROXYZINE PAMOATE 50 MG: 50 CAPSULE ORAL at 20:49

## 2022-03-22 RX ADMIN — DOXYCYCLINE 100 MG: 100 CAPSULE ORAL at 09:16

## 2022-03-22 NOTE — BH NOTES
After eating dinner Marysol Snell went to bed, claiming she hasn't eaten or slept for \"days\". Observed to have runny nose. Educated on unit schedule and access to . She is free from falls and harm. No concerns at this time.

## 2022-03-22 NOTE — ED PROVIDER NOTES
EMERGENCY DEPARTMENT HISTORY AND PHYSICAL EXAM    8:34 AM    Date: 3/22/2022  Patient Name: Ela Pate    History of Presenting Illness     Chief Complaint   Patient presents with   [address] Problem       History Provided By: Patient  Location/Duration/Severity/Modifying factors   HPI  Ela Pate is a 31 y.o. female with past medical history of anxiety and depression presenting for evaluation of suicidal ideations. She says that she has been under a lot of stress lately and for the last 2 days has been having thoughts of self-harm. She is walking down [address] and considering jumping to traffic or jumping off of a bridge. She came in today because she wants help with these thoughts. She denies any hallucinations, homicidal ideations. She was previously a heroin drug user however has been clean for 6 years but does occasionally smoke spice. Denies any alcohol use, smokes cigarettes regularly. She stopped taking her Lexapro and Seroquel last month because she ran out. Her only medical complaint is that she tested positive for gonorrhea a month or 2 ago at [facility] but was never treated. She denies any vaginal discharge, abdominal pain or other medical needs. She would not like to be tested again for gonorrhea.     PCP: None    Facility-Administered Medications Ordered in Other Encounters   Medication Dose Route Frequency Provider Last Rate Last Admin    traZODone (DESYREL) tablet 50 mg  50 mg Oral QHS PRN Edilia Allred MD        hydrOXYzine pamoate (VISTARIL) capsule 50 mg  50 mg Oral TID PRN Edilia Allred MD        acetaminophen (TYLENOL) tablet 650 mg  650 mg Oral Q6H PRN Edilia Allred MD        doxycycline (VIBRAMYCIN) capsule 100 mg  100 mg Oral Q12H Edilia Allred MD           Past History     Past Medical History:  Past Medical History:   Diagnosis Date    Anxiety     Asthma     Depression     Drug use     Endometriosis     Hepatitis C     Heroin abuse ([facility] [address]     Kidney infection        Past Surgical History:  Past Surgical History:   Procedure Laterality Date    HX DILATION AND CURETTAGE      HX LEEP PROCEDURE         Family History:  History reviewed. No pertinent family history. Social History:  Social History     Tobacco Use    Smoking status: Current Every Day Smoker     Packs/day: 0.50     Years: 1.00     Pack years: 0.50    Smokeless tobacco: Never Used   Substance Use Topics    Alcohol use: Yes     Comment: Occasionally     Drug use: Yes     Types: Other, Heroin     Comment: spice       Allergies:  No Known Allergies    I reviewed and confirmed the above information with patient and updated as necessary. Review of Systems     Review of Systems   Constitutional: Negative for diaphoresis and fever. HENT: Negative for ear pain and sore throat. Eyes:        No acute change in vision   Respiratory: Negative for cough and shortness of breath. Cardiovascular: Negative for chest pain and leg swelling. Gastrointestinal: Negative for abdominal pain and vomiting. Genitourinary: Negative for dysuria. Musculoskeletal: Negative for neck pain. Skin: Negative for wound. Neurological: Negative for weakness and headaches. Psychiatric/Behavioral: Positive for suicidal ideas. Physical Exam     Visit Vitals  /68   Pulse 66   Temp 97.5 °F (36.4 °C)   Resp 18   Ht 4' 11\" (1.499 m)   Wt 51.3 kg (113 lb)   LMP 09/03/2019 (Exact Date)   SpO2 98%   BMI 22.82 kg/m²       Physical Exam  Vitals and nursing note reviewed. Constitutional:       Comments: Adult female resting comfortably, calm, cooperative   HENT:      Mouth/Throat:      Mouth: Mucous membranes are moist.   Eyes:      Pupils: Pupils are equal, round, and reactive to light. Cardiovascular:      Rate and Rhythm: Normal rate and regular rhythm. Pulses: Normal pulses. Pulmonary:      Effort: Pulmonary effort is normal.      Breath sounds: Normal breath sounds. Abdominal:      Palpations: Abdomen is soft. Tenderness: There is no abdominal tenderness. Musculoskeletal:         General: No signs of injury. Normal range of motion. Cervical back: Normal range of motion. Skin:     General: Skin is warm. Neurological:      General: No focal deficit present. Mental Status: She is alert and oriented to person, place, and time. Mental status is at baseline. Psychiatric:         Attention and Perception: Attention normal.         Mood and Affect: Mood normal.         Speech: Speech normal.         Behavior: Behavior normal.         Thought Content: Thought content includes suicidal ideation. Thought content does not include homicidal ideation. Thought content includes suicidal plan. Thought content does not include homicidal plan. Diagnostic Study Results     Labs -  Recent Results (from the past 24 hour(s))   CBC WITH AUTOMATED DIFF    Collection Time: 03/22/22  8:30 AM   Result Value Ref Range    WBC 11.1 4.6 - 13.2 K/uL    RBC 4.94 4.20 - 5.30 M/uL    HGB 14.3 12.0 - 16.0 g/dL    HCT 44.1 35.0 - 45.0 %    MCV 89.3 78.0 - 100.0 FL    MCH 28.9 24.0 - 34.0 PG    MCHC 32.4 31.0 - 37.0 g/dL    RDW 14.4 11.6 - 14.5 %    PLATELET 530 064 - 300 K/uL    MPV 10.8 9.2 - 11.8 FL    NRBC 0.0 0  WBC    ABSOLUTE NRBC 0.00 0.00 - 0.01 K/uL    NEUTROPHILS 50 40 - 73 %    LYMPHOCYTES 35 21 - 52 %    MONOCYTES 12 (H) 3 - 10 %    EOSINOPHILS 2 0 - 5 %    BASOPHILS 1 0 - 2 %    IMMATURE GRANULOCYTES 0 0.0 - 0.5 %    ABS. NEUTROPHILS 5.5 1.8 - 8.0 K/UL    ABS. LYMPHOCYTES 3.9 (H) 0.9 - 3.6 K/UL    ABS. MONOCYTES 1.3 (H) 0.05 - 1.2 K/UL    ABS. EOSINOPHILS 0.2 0.0 - 0.4 K/UL    ABS. BASOPHILS 0.1 0.0 - 0.1 K/UL    ABS. IMM.  GRANS. 0.0 0.00 - 0.04 K/UL    DF AUTOMATED     METABOLIC PANEL, COMPREHENSIVE    Collection Time: 03/22/22  8:30 AM   Result Value Ref Range    Sodium 139 136 - 145 mmol/L    Potassium 4.8 3.5 - 5.5 mmol/L    Chloride 110 100 - 111 mmol/L CO2 25 21 - 32 mmol/L    Anion gap 4 3.0 - 18 mmol/L    Glucose 75 74 - 99 mg/dL    BUN 12 7.0 - 18 MG/DL    Creatinine 0.59 (L) 0.6 - 1.3 MG/DL    BUN/Creatinine ratio 20 12 - 20      GFR est AA >60 >60 ml/min/1.73m2    GFR est non-AA >60 >60 ml/min/1.73m2    Calcium 8.8 8.5 - 10.1 MG/DL    Bilirubin, total 1.0 0.2 - 1.0 MG/DL    ALT (SGPT) 56 13 - 56 U/L    AST (SGOT) 49 (H) 10 - 38 U/L    Alk.  phosphatase 70 45 - 117 U/L    Protein, total 7.1 6.4 - 8.2 g/dL    Albumin 3.7 3.4 - 5.0 g/dL    Globulin 3.4 2.0 - 4.0 g/dL    A-G Ratio 1.1 0.8 - 1.7     ETHYL ALCOHOL    Collection Time: 03/22/22  8:30 AM   Result Value Ref Range    ALCOHOL(ETHYL),SERUM <3 0 - 3 MG/DL   SALICYLATE    Collection Time: 03/22/22  8:30 AM   Result Value Ref Range    Salicylate level 3.2 2.8 - 20.0 MG/DL   ACETAMINOPHEN    Collection Time: 03/22/22  8:30 AM   Result Value Ref Range    Acetaminophen level <2 (L) 10.0 - 30.0 ug/mL   COVID-19 RAPID TEST    Collection Time: 03/22/22  8:30 AM   Result Value Ref Range    Specimen source Nasopharyngeal      COVID-19 rapid test Not detected NOTD     URINALYSIS W/ RFLX MICROSCOPIC    Collection Time: 03/22/22  9:25 AM   Result Value Ref Range    Color DARK YELLOW      Appearance CLEAR      Specific gravity >1.030 (H) 1.005 - 1.030    pH (UA) 6.0 5.0 - 8.0      Protein 30 (A) NEG mg/dL    Glucose Negative NEG mg/dL    Ketone 80 (A) NEG mg/dL    Bilirubin SMALL (A) NEG      Blood Negative NEG      Urobilinogen 1.0 0.2 - 1.0 EU/dL    Nitrites Negative NEG      Leukocyte Esterase TRACE (A) NEG     HCG URINE, QL    Collection Time: 03/22/22  9:25 AM   Result Value Ref Range    HCG urine, QL Negative NEG     URINE MICROSCOPIC ONLY    Collection Time: 03/22/22  9:25 AM   Result Value Ref Range    WBC 11 to 20 0 - 5 /hpf    RBC 0 to 1 0 - 5 /hpf    Epithelial cells 1+ 0 - 5 /lpf    Bacteria FEW (A) NEG /hpf    Mucus 3+ (A) NEG /lpf   DRUG SCREEN, URINE    Collection Time: 03/22/22  9:25 AM   Result Value Ref Range    BENZODIAZEPINES Negative NEG      BARBITURATES Negative NEG      THC (TH-CANNABINOL) Positive (A) NEG      OPIATES Negative NEG      PCP(PHENCYCLIDINE) Negative NEG      COCAINE Positive (A) NEG      AMPHETAMINES Negative NEG      METHADONE Negative NEG      HDSCOM (NOTE)    EKG, 12 LEAD, INITIAL    Collection Time: 03/22/22 12:04 PM   Result Value Ref Range    Ventricular Rate 64 BPM    Atrial Rate 64 BPM    P-R Interval 114 ms    QRS Duration 86 ms    Q-T Interval 442 ms    QTC Calculation (Bezet) 455 ms    Calculated P Axis 12 degrees    Calculated R Axis 83 degrees    Calculated T Axis 21 degrees    Diagnosis       Normal sinus rhythm  Normal ECG  When compared with ECG of 18-JAN-2018 16:25,  No significant change was found           Radiologic Studies -   No orders to display       Medical Decision Making   I am the first provider for this patient. I reviewed the vital signs, available nursing notes, past medical history, past surgical history, family history and social history. Vital Signs-Reviewed the patient's vital signs. Records Reviewed: Nursing Notes and Old Medical Records (Time of Review: 8:34 AM)    Provider Notes (Medical Decision Making):   MDM  71-year-old female here for suicidal ideations consider medication noncompliance, major depressive disorder, substance-induced mood disorder, others. No evidence of PUD    ED Course: Progress Notes, Reevaluation, and Consults:     Patient afebrile hemodynamically normal  Resting comfortably, nondistressed    We will screen mental health labs, treat for gonorrhea at the patient's request, she is refusing further testing. Screening labs are unremarkable, slight urinary tract infection however will be adequately covered by doxycycline. Patient medically cleared for further evaluation for potential mental health placement.     Patient placed at [facility] for psychiatric care, transferred with Critical access hospital care  Discharged with a prescription for doxycycline which she will need to take    Procedures    Diagnosis     Clinical Impression:   1. Suicidal ideation    2. Gonorrhea in female    3. Acute cystitis without hematuria        Disposition: [facility]    Follow-up Information    None          Discharge Medication List as of 3/22/2022  1:48 PM      START taking these medications    Details   doxycycline (VIBRA-TABS) 100 mg tablet Take 1 Tablet by mouth two (2) times a day for 10 days. , Print, Disp-20 Tablet, R-0         CONTINUE these medications which have NOT CHANGED    Details   escitalopram oxalate (Lexapro) 10 mg tablet Take 10 mg by mouth daily. , Historical Med      !! QUEtiapine (SEROqueL) 50 mg tablet Take 50 mg by mouth every six (6) hours as needed., Historical Med      !! QUEtiapine (SEROqueL) 200 mg tablet Take 200 mg by mouth nightly., Historical Med       !! - Potential duplicate medications found. Please discuss with provider. Cristobal Robbins MD   Emergency Medicine   March 22, 2022, 8:34 AM     This note is dictated utilizing Dragon voice recognition software. Unfortunately this leads to occasional typographical errors using the voice recognition. I apologize in advance if the situation occurs. If questions occur please do not hesitate to contact me directly.     Aniya King MD

## 2022-03-22 NOTE — ED NOTES
Patient has been accepted to [facility], Dr. Simon Nix     Call report to [phone]

## 2022-03-22 NOTE — PROGRESS NOTES
contacted by ED for voluntary inpt psych placement. If at any time Patient becomes involuntary- ED will need to contact Police for a paperless ECO (Emergency Custody Order) who will then call [facility] directly to assist with TDO as needed.  will contact all facilities and they will contact ED directly for questions or acceptances. CM does not need to be notified by staff once bed confirmed to ED by facility. Once all facilities have been contacted should a bed not be available through today. CM will resume search in the morning and continue to work toward transition of care.           CM contacted and provided MRN  to [facility], for review    CM contacted and provided MRN  To [facility] and [facility] for review       CM faxed clinical information to [facility] for review-prsenting to physician for acceptance, will call ED if they can accept    CM faxed clinical information to [facility] for review    CM faxed clinical information to [facility] for review     CM faxed clinical information to [email or web address]  for review    CM faxed clinical information to [facility]  for review    CM faxed clinical information to [facility] for review     CM faxed clinical information to [facility]  for review    CM faxed clinical information to [facility] for review    CM faxed clinical information to [facility] [address], [facility], Angus Pickard  for review    CM faxed clinical information to [facility] for review     CM faxed clinical information to [facility]  for review    CM faxed clinical information to [facility]  for review    CM faxed clinical information to  [facility] for review     CM faxed clinical information to [facility]   for review    CM faxed clinical information to [facility] for review     CM faxed clinical information to Eladia Kraus  for review    CM faxed clinical information to [facility]  for review    CM faxed clinical information to [facility] for review     CM faxed clinical information to [facility] for review    CM faxed clinical information to Nataliia Chaudhry for review    CM faxed clinical information to Yvonne Silveira for review    CM faxed clinical information to [facility]  for review

## 2022-03-22 NOTE — BH NOTES
Nursing Admission Note    Patient arrived onto unit via in a wheelchair dressed in  paper scrubs and accompanied by security staff. Patient is cooperative with admissions process, did fill out paperwork, and did allow nurse to orient to the unit. Patient currently on  safety/suicide/fall precautions, and general observation rounding. Patient Interacted appropriately and presents as Anxious, with Impulsive disposition and Preoccupied thought content. [facility]. Patient reports suicidal ideation x 2 days since argument with boyfriend. Pts plan included running into traffic or jumping off a bridge. States she has been out of prescriptions of Lexapro 10mg daily and Seroquel 200mg nightly for approx 1 month. States these medications were initially prescribed at an urgent care and she has been subsequently getting them filled in emergency rooms. Patient  voluntary for treatment at this time. Patient given hygiene bag and escorted to room. Will continue to provide support as needed. RN's will initiate, develop, implement, revise or review treatment plan.

## 2022-03-22 NOTE — ED TRIAGE NOTES
Pt presents for suicidal thoughts x 2 days. Denies plan. Has not been taking Lexapro and Seroquel for one month because she ran out. Denies AVH.  Denies alcohol. (+)spice

## 2022-03-23 PROBLEM — N17.9 ACUTE RENAL INJURY (HCC): Status: RESOLVED | Noted: 2018-01-18 | Resolved: 2022-03-23

## 2022-03-23 PROBLEM — E86.0 DEHYDRATION: Status: RESOLVED | Noted: 2018-01-18 | Resolved: 2022-03-23

## 2022-03-23 PROBLEM — R79.89 ELEVATED LACTIC ACID LEVEL: Status: RESOLVED | Noted: 2018-03-11 | Resolved: 2022-03-23

## 2022-03-23 PROBLEM — Z34.90 PREGNANCY: Status: RESOLVED | Noted: 2018-03-11 | Resolved: 2022-03-23

## 2022-03-23 PROBLEM — O23.01 PYELONEPHRITIS AFFECTING PREGNANCY IN FIRST TRIMESTER: Status: RESOLVED | Noted: 2018-01-18 | Resolved: 2022-03-23

## 2022-03-23 PROBLEM — D72.829 LEUKOCYTOSIS: Status: RESOLVED | Noted: 2018-02-05 | Resolved: 2022-03-23

## 2022-03-23 PROBLEM — E87.6 HYPOKALEMIA: Status: RESOLVED | Noted: 2018-01-18 | Resolved: 2022-03-23

## 2022-03-23 PROBLEM — F33.2 MAJOR DEPRESSIVE DISORDER, RECURRENT SEVERE WITHOUT PSYCHOTIC FEATURES (HCC): Chronic | Status: ACTIVE | Noted: 2022-03-22

## 2022-03-23 PROBLEM — F34.1 DYSTHYMIC DISORDER: Status: RESOLVED | Noted: 2018-01-20 | Resolved: 2022-03-23

## 2022-03-23 PROBLEM — Z3A.01 5 WEEKS GESTATION OF PREGNANCY: Status: RESOLVED | Noted: 2018-01-18 | Resolved: 2022-03-23

## 2022-03-23 PROBLEM — R10.9 FLANK PAIN: Status: RESOLVED | Noted: 2018-03-11 | Resolved: 2022-03-23

## 2022-03-23 PROBLEM — D68.00 VON WILLEBRAND DISEASE: Chronic | Status: ACTIVE | Noted: 2022-03-23

## 2022-03-23 PROCEDURE — 99238 HOSP IP/OBS DSCHRG MGMT 30/<: CPT | Performed by: PSYCHIATRY & NEUROLOGY

## 2022-03-23 PROCEDURE — 74011250637 HC RX REV CODE- 250/637: Performed by: PSYCHIATRY & NEUROLOGY

## 2022-03-23 RX ORDER — DOXYCYCLINE 100 MG/1
100 CAPSULE ORAL EVERY 12 HOURS
Qty: 18 CAPSULE | Refills: 0 | Status: SHIPPED | OUTPATIENT
Start: 2022-03-23 | End: 2022-04-01

## 2022-03-23 RX ORDER — QUETIAPINE FUMARATE 200 MG/1
200 TABLET, FILM COATED ORAL
Qty: 30 TABLET | Refills: 0 | Status: SHIPPED | OUTPATIENT
Start: 2022-03-23

## 2022-03-23 RX ORDER — ESCITALOPRAM OXALATE 10 MG/1
20 TABLET ORAL DAILY
Qty: 30 TABLET | Refills: 0 | Status: SHIPPED | OUTPATIENT
Start: 2022-03-23

## 2022-03-23 RX ADMIN — DOXYCYCLINE HYCLATE 100 MG: 100 CAPSULE ORAL at 11:02

## 2022-03-23 NOTE — BSMART NOTE
BH Biopsychosocial Assessment    Current Level of Psychosocial Functioning     [x]Independent  []Dependent  []Minimal Assist      Comments:      Psychosocial High Risk Factors (check all that apply)      []Unable to obtain meds                                                               []Chronic illness/pain    [x]Substance abuse   []Lack of Family Support   []Financial stress   []Isolation   []Inadequate Community Resources  [x]Suicide attempt(s)  []Not taking medications   []Victim of crime   []Developmental Delay  []Unable to manage personal needs    []Age 72 or older   []  Homeless  []Pankaj transportation   []Readmission within 30 days  []Unemployment  []Traumatic Event    Psychiatric Advanced Directive: None reported by patient     Family to involve in treatment: Patient reports she is      Sexual Orientation: Patient reports she is heterosexual.     Patient Strengths: Unknown at this time     Patient Barriers: Patients inability to understand the need for assistance     Opiate education provided: Patient denies the need for treatment and feels she already completed substance abuse treatment successfully however, is positive for Cocaine. Safety plan: Patient is able to contract for safety     [facility]/ history: Patient denies a history of hospitalizations. Plan of Care:  medication management, group/individual therapies, family meetings, psycho -education, treatment team meetings to assist with stabilization    Initial Discharge Plan:  SW addressed individual counseling however. Patient reports she is not interested. Clinical Summary:  Patient reports she has intrusive thoughts of suicide however, feels she will never act upon them. Patient reports she has a history of drug use however, feels she is in control. Patient is positive for Cocaine.  Denies the need for treatment and reports she will see counseling services on her own when she is ready.     Ion Dent, LCSW-E

## 2022-03-23 NOTE — BSMART NOTE
Social Work Group 3/23/22   Positive Goal Setting Group   Attendance  declined   Number of participants 1   Time in 11:00   Time out 11:30   Total Time 30   Interaction  was on the unit   Interventions/  techniques Informed, supported  and encouraged       Alley Weber MA, LMHP-R

## 2022-03-23 NOTE — BSMART NOTE
Social Work Group 3/23/22   Relaxation and Positive Goal Setting Group   Attendance  declined   Number of participants 3   Time in 1:15   Time out 2:10   Total Time 54   Interaction Was resting in room    Interventions/  techniques Informed, supported  and encouraged       Ezekiel Green MA, LMHP-R

## 2022-03-23 NOTE — BH NOTES
Pt. escorted by staff  To the lobby for discharge and waiting for her cab.pt's arm band removed, belongings , prescription and discharge instruction given to her.

## 2022-03-23 NOTE — BH NOTES
Pt asked question regarding seroquel 50 mg. Pt informed to discuss this medication with her psychiatrist tomorrow d/t it not being currently ordered for her. Pt did request medication for insomnia and anxiety and was given prn Trazodone 50 mg po and Vistaril 50 mg po for these symptoms. Pt ate hs snack.   Will continue to support/monitor

## 2022-03-23 NOTE — H&P
Psychiatry History and Physical    Subjective:     Date of Evaluation:  3/23/2022    Reason for Referral:  Nimesh Shields was referred to the examiners from ED for SI. History of Presenting Problem: 32 yo cauc female in NAD, well developed and nourished with hx of MDD, HCV, Sherle Pat disease, cocaine abuse, anxiety, and seizures who presented to the ED for SI x2 days. She was out of her Lexapro and Seroquel for 1 month. She currently denies depression, SI, HI, AH, and VH. She endorses mild anxiety. Patient Active Problem List    Diagnosis Date Noted    Von Willebrand disease ([facility] 03/23/2022    Major depressive disorder, recurrent severe without psychotic features ([facility] 03/22/2022    Nicotine dependence 03/12/2018    Hepatitis C 03/12/2018    Cocaine abuse ([facility] 03/11/2018    Personality disorder ([facility] 01/20/2018    Anxiety 04/22/2015    Seizure ([facility] 02/21/2015     Past Medical History:   Diagnosis Date    Anxiety     Asthma     Depression     Drug use     Endometriosis     Hepatitis C     Heroin abuse ([facility]     Kidney infection      Past Surgical History:   Procedure Laterality Date    HX DILATION AND CURETTAGE      HX LEEP PROCEDURE         No family history on file. Social History     Tobacco Use    Smoking status: Current Every Day Smoker     Packs/day: 0.50     Years: 1.00     Pack years: 0.50    Smokeless tobacco: Never Used   Substance Use Topics    Alcohol use: Yes     Comment: Occasionally      Prior to Admission medications    Medication Sig Start Date End Date Taking? Authorizing Provider   escitalopram oxalate (Lexapro) 10 mg tablet Take 2 Tablets by mouth daily. Indications: major depressive disorder 3/23/22  Yes Zoie Kenney MD   QUEtiapine (SEROqueL) 200 mg tablet Take 1 Tablet by mouth nightly.  Indications: additional treatment for major depressive disorder 3/23/22  Yes Zoie Kenney MD   doxycycline (VIBRAMYCIN) 100 mg capsule Take 1 Capsule by mouth every twelve (12) hours for 18 doses. Indications: gonorrhea 3/23/22 4/1/22 Yes Felipe Palumbo MD   doxycycline (VIBRA-TABS) 100 mg tablet Take 1 Tablet by mouth two (2) times a day for 10 days. 3/22/22 4/1/22 Yes Belkis Desai MD   QUEtiapine (SEROqueL) 50 mg tablet Take 50 mg by mouth every six (6) hours as needed. Yes Other, MD Zamzam     No Known Allergies     Review of Systems - History obtained from chart review and the patient  General ROS: negative  Psychological ROS: positive for - anxiety  Ophthalmic ROS: negative  ENT ROS: negative  Allergy and Immunology ROS: negative  Hematological and Lymphatic ROS: negative  Endocrine ROS: negative  Respiratory ROS: no cough, shortness of breath, or wheezing  Cardiovascular ROS: no chest pain or dyspnea on exertion  Gastrointestinal ROS: no abdominal pain, change in bowel habits, or black or bloody stools  Genito-Urinary ROS: no dysuria, trouble voiding, or hematuria  Musculoskeletal ROS: negative  Neurological ROS: no TIA or stroke symptoms  Dermatological ROS: negative      Objective:     No data found.     Mental Status exam: WNL except for    Sensorium  Alert and Oriented x 2   Orientation person and place   Relations cooperative   Eye Contact appropriate   Appearance:  age appropriate   Motor Behavior:  within normal limits   Speech:  normal pitch, normal volume and non-pressured   Vocabulary average   Thought Process: within normal limits   Thought Content free of delusions and free of hallucinations   Suicidal ideations no plan  and no intention   Homicidal ideations no plan  and no intention   Mood:  stable   Affect:  stable   Memory recent  adequate   Memory remote:  adequate   Concentration:  adequate   Abstraction:  concrete   Insight:  good   Reliability good   Judgment:  good         Physical Exam:   Visit Vitals  /72   Pulse 82   Temp 96.8 °F (36 °C)   Resp 18   LMP 09/03/2019 (Exact Date)     General:  Alert, cooperative, no distress, appears stated age. Head:  Normocephalic, without obvious abnormality, atraumatic. Eyes:  Conjunctivae/corneas clear. PERRL, EOMs intact. Fundi benign. Ears:  Normal TMs and external ear canals both ears. Nose: Nares normal. Septum midline. Mucosa normal. No drainage or sinus tenderness. Throat: Lips, mucosa, and tongue normal. Teeth and gums normal.   Neck: Supple, symmetrical, trachea midline, no adenopathy, thyroid: no enlargement/tenderness/nodules, no carotid bruit and no JVD. Back:   Symmetric, no curvature. ROM normal. No CVA tenderness. Lungs:   Clear to auscultation bilaterally. Chest wall:  No tenderness or deformity. Heart:  Regular rate and rhythm, S1, S2 normal, no murmur, click, rub or gallop. Abdomen:   Soft, non-tender. Bowel sounds normal. No masses,  No organomegaly. Extremities: Extremities normal, atraumatic, no cyanosis or edema. Pulses: 2+ and symmetric all extremities. Skin: Skin color, texture, turgor normal. No rashes or lesions. Lymph nodes: Cervical, supraclavicular, and axillary nodes normal.   Neurologic: CNII-XII intact. Normal strength, sensation and reflexes throughout. Impression:      Principal Problem:    Major depressive disorder, recurrent severe without psychotic features ([address] (3/22/2022)    Active Problems:    Cocaine abuse ([address] (3/11/2018)      Nicotine dependence (3/12/2018)      Hepatitis C (3/12/2018)      Erby Fake disease ([facility] (3/23/2022)          Plan:     Recommendations for Treatment/Conditions:  Psychiatric treatment recommended while in hospital  Admit to behavioral health for MDD. Referral To:    Inpatient psychiatric care      Glo Delgado   3/23/2022 3:38 PM

## 2022-03-23 NOTE — BH NOTES
Pt appeared to have slept for 7+ hours thus far. No disruption observed. Pt appears to be sleeping at this time. Will continue to monitor for safety and changes in behavior.

## 2022-03-23 NOTE — DISCHARGE SUMMARY
[facility]    Name:  Michelle Vargas  MR#:   [id number]  :  1985  ACCOUNT #:  [de-identified]  ADMIT DATE:  2022  DISCHARGE DATE:  2022    SHORT STAY SUMMARY    IDENTIFYING DATA:  The patient is a 63-year-old single white female, resident of [place], [place], who is living with her significant other and her two children. She is covered by [facility]. BASIS FOR ADMISSION:  The patient is admitted after presentation to the [facility] Emergency Room saying she was feeling overwhelmed and wanted to be admitted to the hospital.  She had previous history of being a heroin addict, but had not been using heroin for 6 years. She has been episodically smoking or snorting cocaine. She was positive for cocaine and cannabis on her drug screen and negative alcohol level. She had a history of depression and anxiety and was supposed to be taking Lexapro 20 mg daily and Seroquel 200 mg at bedtime but did not follow up with the prescriber and ran out of the medicine. She has gone several times to emergency rooms to try to get this filled. She also had a stressor in that she was diagnosed with gonorrhea prior month at [facility] and never received treatment. She was started on treatment by the physicians at the emergency room. She said that she was overwhelmed and considering jumping in traffic or jumping off a bridge, needed to be on medicine again. Attention is invited to her one admission to [facility] on 2018, at which time she was diagnosed with bipolar disorder type 1, alcohol use disorder - severe, although she had not been drinking recently, and heroin use disorder as well as borderline personality disorder. She was 8 weeks' pregnant at that time and required detoxification. She did not wish to be on medicines and was detoxified since she was pregnant.   As an outpatient, she had been seen in emergency rooms and other providers thereafter and placed on the above-noted Lexapro initially at 10 mg daily, though was increased to 20 mg daily at her last emergency room visit, but she never followed up with anyone. The patient reports that she would rarely snort cocaine at parties and had done so recently but was not using routinely. She does use CBD routinely which helps anxiety. She had recently gotten a night job as a  and quite stressful having no sleep all night and take care of her son and daughter. She also had helped several friends get jobs at the same place, and she said she was having to walk to their apartments to wake them up to make sure they made it to work because she did not want to look bad. She had run out of medications a month ago, and she was having severe anxiety and panic. She was getting more depressed and having poor sleep without her medication. Appetite was fair and energy level fair. She denied hallucinations or delusions. She was insisting that she needed to be on her medications so that she could stabilize and be out of the hospital.    DIAGNOSTIC DATA:  Laboratory testing done in the emergency room included the normal CBC; concentrated urine with specific gravity 1.030, protein 30 mg/dL, ketones 80 mg/dL, small bilirubin, trace leukocyte esterase, 3+ mucus, few bacteria and 11-20 white blood cells. CMP revealed minimal increased AST 49 units/L, remainder normal.  Negative hCG, acetaminophen negative, salicylate level therapeutic 3.2 mg/dL. Alcohol level 0.  RSV was negative. SARS rapid COVID test was negative. Influenza A and B were negative. EKG showed a ventricular rate of 64 with a QT interval 442, normal sinus rhythm, normal EKG without change compared to prior EKG from 2018.     PAST MEDICAL HISTORY:  Medical history of significance included a history of hepatitis C, off heroin for the past 6 years, von Willebrand's bleeding disorder and history of a grand mal seizure in the past but none for 6 years. She did end up having a hysterectomy because of complications after her second child was born. She is on no medications for the von Willebrand's, hepatitis or seizures. SHE DENIED FOOD OR DRUG ALLERGIES. REVIEW OF SYSTEMS:  Negative. SUBSTANCE USE HISTORY:  She denied alcohol use. She smokes 3-4 cigarettes a day. Drug abuse includes a history of alcohol abuse 10 years ago, heroin dependence 6 years ago and episodic cocaine use and routine marijuana use. She has not gone to any treatment programs. FAMILY HISTORY AND SOCIAL HISTORY:  She did not acknowledge any family history of psychiatric illness. She lives with her significant other and son aged 3 and daughter aged 10. Currently, son is with the significant other, and 10year-old daughter is with the grandmother. MENTAL STATUS EXAM:  The patient was a small, alert, oriented female. Eye contact was fair. Speech was fluent. Mood was anxious to unhappy with a congruent affect. Thought processing was logical and goal-directed. She denied hallucinatory or delusional material.  Memory and cognition were intact. Insight and judgment were influenced by her unhappiness with her life situation. IQ was estimated in the normal range. She is denying homicidal or suicidal ideas currently and said that she was never actively suicidal but rather felt the need to get someone to get her back on her medications and could not come up with any other way. HOSPITAL COURSE:  The patient was admitted to the [facility] adult unit where she was afforded individual, group and milieu therapies. Physical examination was done in the [facility] Emergency Room, was not in the hospital long enough to have a physical done by the nurse practitioner here. She was resumed on trazodone 50 mg at night as needed for sleep, hydroxyzine 50 mg as needed for anxiety, doxycycline 100 mg b.i.d.   She was insisting that she was here specifically to get onto the Seroquel 100 mg at bedtime and the Lexapro 20 mg daily and did not actually want anything else other than that. These were written for, but she did not stay in the hospital long enough for these. It did not appear indicated to keep her in the hospital as she was not homicidal, suicidal or psychotic, and this really should have been dealt with as an outpatient, but she was unable to get access to any providers. CONDITION ON DISCHARGE:  Fair. PROGNOSIS:  Fair. ASSESSMENT:  AXIS I:  Major depression, recurrent, severe, without psychosis. Cocaine use disorder, moderate, episodic. Opioid use disorder, severe, in remission. Alcohol use disorder, severe, in remission. Nicotine use disorder, mild. Cannabis use disorder, moderate. AXIS II:  Rule out borderline personality disorder. AXIS III:  Perico Roche disease. Hepatitis C by history. Gonorrhea. History of grand mal seizures. DISPOSITION/FOLLOWUP:  [facility] to [facility]. [facility] for medication management. Follow up with own primary care provider for medical health problems. DISCHARGE MEDICATIONS:  1. Quetiapine (Seroquel) 200 mg tablet one at bedtime, #30.  2.  Escitalopram 20 mg tablet one daily, #30.  3.  Doxycycline 100 mg capsule one b.i.d., #18.       Mary Mai MD      [id number]  D:  03/23/2022 13:03  T:  03/23/2022 13:46  JOB #:  [id number]

## 2022-03-23 NOTE — DISCHARGE INSTRUCTIONS
BEHAVIORAL HEALTH NURSING DISCHARGE NOTE      The following personal items collected during your admission are returned to you:   Dental Appliance: Dental Appliances: None  Vision:    Hearing Aid:    Jewelry: Jewelry: None  Clothing: Clothing: Other (comment),Sweater,Jacket/Coat,Pants  Other Valuables: Other Valuables: None  Valuables sent to safe:        PATIENT INSTRUCTIONS:             The discharge information has been reviewed with the patient. The patient verbalized understanding.         Patient armband removed and shredded

## 2022-03-23 NOTE — BH NOTES
STAR Note: The above pt has been alert and cooperative this shift. She has been on the phone with family and friends this shift.

## 2022-03-23 NOTE — PROGRESS NOTES
Problem: Suicide  Goal: *STG: Remains safe in hospital  Description: Pt free from falls and harm daily. Outcome: Progressing Towards Goal  Goal: *STG: Attends activities and groups  Description: Pt attends minimum 2 groups daily. Outcome: Progressing Towards Goal  Goal: *STG/LTG: Complies with medication therapy  Description: Pt takes medication as prescribed daily. Outcome: Progressing Towards Goal   Patient slept late, slightly irritable. Patient denies SI. Will encouraged to attend groups. Voiced no complaints.

## 2022-03-29 LAB
ATRIAL RATE: 64 BPM
CALCULATED P AXIS, ECG09: 12 DEGREES
CALCULATED R AXIS, ECG10: 83 DEGREES
CALCULATED T AXIS, ECG11: 21 DEGREES
DIAGNOSIS, 93000: NORMAL
P-R INTERVAL, ECG05: 114 MS
Q-T INTERVAL, ECG07: 442 MS
QRS DURATION, ECG06: 86 MS
QTC CALCULATION (BEZET), ECG08: 455 MS
VENTRICULAR RATE, ECG03: 64 BPM

## 2023-02-27 NOTE — ED NOTES
Have called report to Vivian Das RN. Pt being admitted to [facility]- remote telemetry. No

## 2023-06-02 NOTE — ED TRIAGE NOTES
Pt arrives via ems stretcher with c\o low back pain x 1 month, pt sts she was seen 2 days ago at [facility] for same complaint and given keflex for UTI, pt sts she is approx 10 weeks pregnant, pt is writhing in bed rocking back and fourth screaming out, MD Monico Stoll made aware
Standing/Walking/Toileting/Moving from bed to stretcher

## 2023-10-17 NOTE — PROGRESS NOTES
INITIAL NUTRITION ASSESSMENT     RECOMMENDATIONS/PLAN:   Continue w/ POC  Monitor labs/lytes, PO intakes, skin integrity, wt, fluid status, BM    REASON FOR ASSESSMENT:     []  RN Referral:    [x] MST score >/=2  Malnutrition Screening Tool (MST):  Recently Lost Weight Without Trying: Unsure  If Yes, How Much Weight Loss: Unsure  Eating Poorly Due to Decreased Appetite: Yes  MST Score: 5      NUTRITION ASSESSMENT:   Client History: 29 yrs old Female admitted with anxiety, flank pain. Pt is 6 weeks pregnant per MD     PMHx: anxiety, asthma, depression, drug use, endometriosis, hepatitis c, heroin abuse, kidney infection  Cultural/Christianity Food Preferences: None Identified    FOOD/NUTRITION HISTORY  Diet History: unabe to obtain at this time    Food Allergies:  [x] NKFA     Pertinent PTA Medications:  [x] Reviewed  NUTRITION INTAKE   Diet Order:  Regular      Average PO Intake:       No data found. Pertinent Medications:  [x] Reviewed; Electrolyte Replacement Protocol: []K  []Mg  []PO4    Insulin:  [] SSI  [] Pre-meal   []  Basal   [] Drip  [x] None  Pt expected to meet estimated nutrient needs through next review:          [x]  Yes     [] No; ANTHROPOMETRICS  Height: 4' 11\" (149.9 cm)       Weight: 49.9 kg (109 lb 14.4 oz)    BMI: 22.2 kg/m^2  -  normal weight (18.5%-24.9% BMI)        Weight change: no wt loss noted in chart hx                                  Comparison to Reference Standards:  IBW: 98 lbs      %IBW: 111%      AdjBW: n/a    NUTRITION-FOCUSED PHYSICAL ASSESSMENT  Skin: No pressure injury noted     GI: No BM noted     BIOCHEMICAL DATA & MEDICAL TESTS  Pertinent Labs:  [x] Reviewed; K-3.4 Ca-8.4     NUTRITION PRESCRIPTION  Calories:1500 kcal/day based on 30kcal/kg  Protein: 40 g/day based on 0.8 g/kg  Fluid: 1500 ml/day based on 1 kcal/ml      NUTRITION DIAGNOSES:   1. At risk for inadequate oral intake related to flank pain as evidence by MD note of n/v PTA.      NUTRITION INTERVENTIONS: INTERVENTIONS:        GOALS:  1. Other:Continue w/ POC 1. Continue to encourage PO intake >75% at most meals by next review 5 days             LEARNING NEEDS (Diet, Supplementation, Food/Nutrient-Drug Interaction):   [x] None Identified  [] Inpatient education provided/documented    [] Identified and patient:  [] Declined     [] Was not appropriate/indicated  NUTRITION MONITORING /EVALUATION:   Follow PO intake  Monitor wt  Monitor renal labs, electrolytes, fluid status    [] Participated in Interdisciplinary Rounds  [x] [address] [place] [address] Reviewed/Documented  DISCHARGE NUTRITION RECOMMENDATIONS ADDRESSED:     [x] Yes- recommended Regular diet     NUTRITION RISK:     [x]  At risk                     []  Not currently at risk     Will follow-up per policy.   Raffy Dupree, [facility] Full Thickness Lip Wedge Repair (Flap) Text: Given the location of the defect and the proximity to free margins a full thickness wedge repair was deemed most appropriate.  Using a sterile surgical marker, the appropriate repair was drawn incorporating the defect and placing the expected incisions perpendicular to the vermilion border.  The vermilion border was also meticulously outlined to ensure appropriate reapproximation during the repair.  The area thus outlined was incised through and through with a #15 scalpel blade.  The muscularis and dermis were reaproximated with deep sutures following hemostasis. Care was taken to realign the vermilion border before proceeding with the superficial closure.  Once the vermilion was realigned the superfical and mucosal closure was finished.